# Patient Record
Sex: FEMALE | Race: WHITE | NOT HISPANIC OR LATINO | Employment: FULL TIME | URBAN - METROPOLITAN AREA
[De-identification: names, ages, dates, MRNs, and addresses within clinical notes are randomized per-mention and may not be internally consistent; named-entity substitution may affect disease eponyms.]

---

## 2017-02-08 PROBLEM — R26.2 DIFFICULTY IN WALKING: Status: ACTIVE | Noted: 2017-02-08

## 2017-02-08 PROBLEM — R42 DIZZINESS: Status: ACTIVE | Noted: 2017-02-08

## 2017-02-08 PROBLEM — R29.3 ABNORMAL POSTURE: Status: ACTIVE | Noted: 2017-02-08

## 2017-02-08 PROBLEM — H81.90 VERTIGINOUS SYNDROME: Status: ACTIVE | Noted: 2017-02-08

## 2017-11-29 RX ORDER — LEVOTHYROXINE SODIUM 88 UG/1
88 TABLET ORAL
Qty: 30 TABLET | Refills: 1 | Status: SHIPPED | OUTPATIENT
Start: 2017-11-29 | End: 2018-01-16 | Stop reason: SDUPTHER

## 2018-01-11 ENCOUNTER — LAB VISIT (OUTPATIENT)
Dept: LAB | Facility: HOSPITAL | Age: 43
End: 2018-01-11
Attending: INTERNAL MEDICINE
Payer: COMMERCIAL

## 2018-01-11 DIAGNOSIS — E89.0 POST-SURGICAL HYPOTHYROIDISM: ICD-10-CM

## 2018-01-11 LAB — TSH SERPL DL<=0.005 MIU/L-ACNC: 1.5 UIU/ML

## 2018-01-11 PROCEDURE — 84443 ASSAY THYROID STIM HORMONE: CPT

## 2018-01-11 PROCEDURE — 36415 COLL VENOUS BLD VENIPUNCTURE: CPT

## 2018-01-16 ENCOUNTER — OFFICE VISIT (OUTPATIENT)
Dept: ENDOCRINOLOGY | Facility: CLINIC | Age: 43
End: 2018-01-16
Payer: COMMERCIAL

## 2018-01-16 VITALS
DIASTOLIC BLOOD PRESSURE: 88 MMHG | WEIGHT: 146.38 LBS | SYSTOLIC BLOOD PRESSURE: 118 MMHG | BODY MASS INDEX: 24.99 KG/M2 | HEIGHT: 64 IN | HEART RATE: 88 BPM

## 2018-01-16 DIAGNOSIS — E89.0 POST-SURGICAL HYPOTHYROIDISM: Primary | ICD-10-CM

## 2018-01-16 PROCEDURE — 99999 PR PBB SHADOW E&M-EST. PATIENT-LVL III: CPT | Mod: PBBFAC,,, | Performed by: INTERNAL MEDICINE

## 2018-01-16 PROCEDURE — 99213 OFFICE O/P EST LOW 20 MIN: CPT | Mod: S$GLB,,, | Performed by: INTERNAL MEDICINE

## 2018-01-16 RX ORDER — LEVOTHYROXINE SODIUM 88 UG/1
88 TABLET ORAL
Qty: 90 TABLET | Refills: 3 | Status: SHIPPED | OUTPATIENT
Start: 2018-01-16 | End: 2018-11-30 | Stop reason: SDUPTHER

## 2018-01-16 NOTE — PROGRESS NOTES
Subjective:      Patient ID: Austyn Burr is a 42 y.o. female.    Chief Complaint:  Thyroid Problem     History of Present Illness  Ms. Andino is presenting for follow up of hypothyroidism s/p thyroidectomy due to bilateral thyroid nodules in 2010 at [facility] Pathology was benign.  Last seen by Dr. Lewis   She is currently on brand name Synthroid 88 mcg daily, since 11/2016     She is currently on Synthroid 88 mcg daily, she takes it regularly. Does not miss any doses. Takes thyroid hormone 2 hrs after breakfast and separate from other medications or supplements. She denies any changes to her weight. Continues to have fatigue. BM's regular. Has anxiety and takes Lexapro. Denies tremors.  Occasional palpitations    Menses regular occurring every 28 days     Seasonal allergies. She has active history of MS.    Vit D borderline normal/insufficiency - takes vit d 22165 IU weekly        Review of Systems   Constitutional: Negative for chills and fever.   Gastrointestinal: Negative for nausea.   No CP  No SOB  No abdominal pain    Objective:   Physical Exam   Nursing note and vitals reviewed.  No thyroid tissue palpated  DTR's 2 + at the knees  No tremor  No tachycardia    Lab Review:   Results for AUSTYN BURR (MRN [id number]) as of 1/16/2018 09:48   Ref. Range 10/29/2015 10:30 2/4/2016 10:40 11/15/2016 11:30 1/11/2018 09:48   TSH Latest Ref Range: 0.400 - 4.000 uIU/mL 2.272 1.537 1.562 1.503       Assessment:     1. Post-surgical hypothyroidism      Plan:     Patient with post surgical hypothyroidism and is biochemical euthyroid  Continue brand name Synthroid 88 mcg daily. Refilled 1 year today.  Recheck tsh yearly    RTC in 1 year. Would like to see Dr. Dubois    Discussed with Dr. Azalia Figueroa MD

## 2018-01-18 NOTE — PROGRESS NOTES
I, Rosalva Vieyra, have personally taken the history and physical exam and I agree with Dr. Figueroa's assessment and plan

## 2018-10-23 PROBLEM — R42 DIZZINESS: Status: ACTIVE | Noted: 2018-10-22

## 2018-10-23 PROBLEM — R26.2 DIFFICULTY WALKING: Status: ACTIVE | Noted: 2018-10-23

## 2018-10-23 PROBLEM — R42 DIZZINESS: Status: RESOLVED | Noted: 2017-02-08 | Resolved: 2018-10-23

## 2018-10-23 PROBLEM — R26.2 DIFFICULTY WALKING: Status: RESOLVED | Noted: 2018-10-23 | Resolved: 2018-10-23

## 2018-11-30 ENCOUNTER — PATIENT MESSAGE (OUTPATIENT)
Dept: ENDOCRINOLOGY | Facility: CLINIC | Age: 43
End: 2018-11-30

## 2018-11-30 RX ORDER — LEVOTHYROXINE SODIUM 88 UG/1
88 TABLET ORAL
Qty: 90 TABLET | Refills: 0 | Status: SHIPPED | OUTPATIENT
Start: 2018-11-30 | End: 2019-01-23 | Stop reason: SDUPTHER

## 2018-12-04 PROBLEM — H81.90 VERTIGINOUS SYNDROME: Status: RESOLVED | Noted: 2017-02-08 | Resolved: 2018-12-04

## 2018-12-04 PROBLEM — R26.2 DIFFICULTY WALKING: Status: RESOLVED | Noted: 2018-10-23 | Resolved: 2018-12-04

## 2018-12-04 PROBLEM — R42 DIZZINESS: Status: RESOLVED | Noted: 2018-10-22 | Resolved: 2018-12-04

## 2019-01-08 ENCOUNTER — PATIENT MESSAGE (OUTPATIENT)
Dept: ENDOCRINOLOGY | Facility: CLINIC | Age: 44
End: 2019-01-08

## 2019-01-08 ENCOUNTER — TELEPHONE (OUTPATIENT)
Dept: ENDOCRINOLOGY | Facility: CLINIC | Age: 44
End: 2019-01-08

## 2019-01-08 DIAGNOSIS — E89.0 POST-SURGICAL HYPOTHYROIDISM: Primary | ICD-10-CM

## 2019-01-16 ENCOUNTER — PATIENT MESSAGE (OUTPATIENT)
Dept: ENDOCRINOLOGY | Facility: CLINIC | Age: 44
End: 2019-01-16

## 2019-01-21 ENCOUNTER — LAB VISIT (OUTPATIENT)
Dept: LAB | Facility: HOSPITAL | Age: 44
End: 2019-01-21
Attending: INTERNAL MEDICINE
Payer: COMMERCIAL

## 2019-01-21 DIAGNOSIS — E89.0 POST-SURGICAL HYPOTHYROIDISM: ICD-10-CM

## 2019-01-21 LAB
T3 SERPL-MCNC: 85 NG/DL
T4 FREE SERPL-MCNC: 0.96 NG/DL
TSH SERPL DL<=0.005 MIU/L-ACNC: 1.24 UIU/ML

## 2019-01-21 PROCEDURE — 84439 ASSAY OF FREE THYROXINE: CPT

## 2019-01-21 PROCEDURE — 84480 ASSAY TRIIODOTHYRONINE (T3): CPT

## 2019-01-21 PROCEDURE — 36415 COLL VENOUS BLD VENIPUNCTURE: CPT

## 2019-01-21 PROCEDURE — 84443 ASSAY THYROID STIM HORMONE: CPT

## 2019-01-23 ENCOUNTER — OFFICE VISIT (OUTPATIENT)
Dept: ENDOCRINOLOGY | Facility: CLINIC | Age: 44
End: 2019-01-23
Payer: COMMERCIAL

## 2019-01-23 VITALS
HEART RATE: 71 BPM | WEIGHT: 156.5 LBS | SYSTOLIC BLOOD PRESSURE: 104 MMHG | HEIGHT: 64 IN | BODY MASS INDEX: 26.72 KG/M2 | DIASTOLIC BLOOD PRESSURE: 80 MMHG

## 2019-01-23 DIAGNOSIS — G35 MULTIPLE SCLEROSIS: ICD-10-CM

## 2019-01-23 DIAGNOSIS — E89.0 POST-SURGICAL HYPOTHYROIDISM: Primary | ICD-10-CM

## 2019-01-23 PROCEDURE — 3008F BODY MASS INDEX DOCD: CPT | Mod: CPTII,S$GLB,, | Performed by: INTERNAL MEDICINE

## 2019-01-23 PROCEDURE — 99999 PR PBB SHADOW E&M-EST. PATIENT-LVL III: CPT | Mod: PBBFAC,,, | Performed by: INTERNAL MEDICINE

## 2019-01-23 PROCEDURE — 99999 PR PBB SHADOW E&M-EST. PATIENT-LVL III: ICD-10-PCS | Mod: PBBFAC,,, | Performed by: INTERNAL MEDICINE

## 2019-01-23 PROCEDURE — 99213 PR OFFICE/OUTPT VISIT, EST, LEVL III, 20-29 MIN: ICD-10-PCS | Mod: S$GLB,,, | Performed by: INTERNAL MEDICINE

## 2019-01-23 PROCEDURE — 99213 OFFICE O/P EST LOW 20 MIN: CPT | Mod: S$GLB,,, | Performed by: INTERNAL MEDICINE

## 2019-01-23 PROCEDURE — 3008F PR BODY MASS INDEX (BMI) DOCUMENTED: ICD-10-PCS | Mod: CPTII,S$GLB,, | Performed by: INTERNAL MEDICINE

## 2019-01-23 RX ORDER — LEVOTHYROXINE SODIUM 88 UG/1
88 TABLET ORAL
Qty: 90 TABLET | Refills: 3 | Status: SHIPPED | OUTPATIENT
Start: 2019-01-23 | End: 2020-02-06

## 2019-01-23 NOTE — PATIENT INSTRUCTIONS
Labs and follow up visit with me in one year.     Copies of your blood tests:    Results for orders placed or performed in visit on 01/21/19   TSH   Result Value Ref Range    TSH 1.242 0.400 - 4.000 uIU/mL   T3   Result Value Ref Range    T3, Total 85 60 - 180 ng/dL   T4, free   Result Value Ref Range    Free T4 0.96 0.71 - 1.51 ng/dL

## 2019-01-23 NOTE — PROGRESS NOTES
"Subjective:     Patient ID: Thu Juares is a 43 y.o. female.    Chief Complaint: No chief complaint on file.    HPI:   Ms. Juares is a 43 y.o. female who is here for a follow-up visit for evaluation of hypothyroidism s/p thyroidectomy due to bilateral thyroid nodules in 2010 at [facility]ology was benign.     Quit her job last year, felt very upset and angry regarding this change. Started antidepressant and her weight (20 lbs) increased in the past year.    She is currently on brand name Synthroid 88 mcg daily, since 11/2016     She is currently on Synthroid 88 mcg daily, she takes it regularly. Does not miss any doses. Takes thyroid hormone 2 hrs after breakfast and separate from other medications or supplements.     Stopped lexapro and started trintellix by neurologist. Stopped betaseron for MS, she takes Ocrevus infusion twice yearly.        Menses regular occurring every 28 days     Seasonal allergies. She has active history of MS.     Vit D borderline normal/insufficiency - takes vit d 00127 IU weekly     Review of Systems   Other than HPI, negative    Objective:     Physical Exam   Constitutional: She is oriented to person, place, and time. She appears well-developed and well-nourished. No distress.   Eyes: Conjunctivae and EOM are normal. Pupils are equal, round, and reactive to light. No scleral icterus.   No proptosis.    Neck: Normal range of motion. Neck supple.   No thyroid tissue   Cardiovascular: Normal rate and intact distal pulses.   Pulmonary/Chest: Effort normal and breath sounds normal.   Neurological: She is alert and oriented to person, place, and time. She has normal reflexes.   No tremor.   Skin: Skin is warm and dry.   Psychiatric: She has a normal mood and affect.       Vitals:    01/23/19 1008   BP: 104/80   Pulse: 71   Weight: 71 kg (156 lb 8.4 oz)   Height: 5' 4" (1.626 m)       Results for orders placed or performed in visit on 01/21/19   TSH   Result Value Ref Range    " TSH 1.242 0.400 - 4.000 uIU/mL   T3   Result Value Ref Range    T3, Total 85 60 - 180 ng/dL   T4, free   Result Value Ref Range    Free T4 0.96 0.71 - 1.51 ng/dL         Assessment/Plan:     Ms. Juares is a 43 year old woman who is here for evaluation of post surgical hypothyroidism following episode of depression. Currently doing well on new medications.     Discussed weight gain, advised increase exercise/activity level. Set personal goals, change diet.     1. Post-surgical hypothyroidism    - SYNTHROID 88 mcg tablet; Take 1 tablet (88 mcg total) by mouth before breakfast.  Dispense: 90 tablet; Refill: 3    2. Multiple sclerosis    F/u in one year with labs and visit.

## 2020-02-06 DIAGNOSIS — E89.0 POST-SURGICAL HYPOTHYROIDISM: ICD-10-CM

## 2020-02-06 RX ORDER — LEVOTHYROXINE SODIUM 88 UG/1
TABLET ORAL
Qty: 90 TABLET | Refills: 3 | Status: SHIPPED | OUTPATIENT
Start: 2020-02-06 | End: 2020-04-21 | Stop reason: SDUPTHER

## 2020-03-23 ENCOUNTER — PATIENT MESSAGE (OUTPATIENT)
Dept: ENDOCRINOLOGY | Facility: CLINIC | Age: 45
End: 2020-03-23

## 2020-03-23 DIAGNOSIS — E89.0 POST-SURGICAL HYPOTHYROIDISM: Primary | ICD-10-CM

## 2020-04-17 ENCOUNTER — LAB VISIT (OUTPATIENT)
Dept: LAB | Facility: HOSPITAL | Age: 45
End: 2020-04-17
Attending: INTERNAL MEDICINE
Payer: COMMERCIAL

## 2020-04-17 DIAGNOSIS — E89.0 POST-SURGICAL HYPOTHYROIDISM: ICD-10-CM

## 2020-04-17 LAB
T3 SERPL-MCNC: 52 NG/DL (ref 60–180)
T4 FREE SERPL-MCNC: 1.02 NG/DL (ref 0.71–1.51)
TSH SERPL DL<=0.005 MIU/L-ACNC: 2.27 UIU/ML (ref 0.4–4)

## 2020-04-17 PROCEDURE — 84480 ASSAY TRIIODOTHYRONINE (T3): CPT

## 2020-04-17 PROCEDURE — 36415 COLL VENOUS BLD VENIPUNCTURE: CPT

## 2020-04-17 PROCEDURE — 84439 ASSAY OF FREE THYROXINE: CPT

## 2020-04-17 PROCEDURE — 84443 ASSAY THYROID STIM HORMONE: CPT

## 2020-04-21 ENCOUNTER — TELEPHONE (OUTPATIENT)
Dept: ENDOCRINOLOGY | Facility: CLINIC | Age: 45
End: 2020-04-21

## 2020-04-21 ENCOUNTER — OFFICE VISIT (OUTPATIENT)
Dept: ENDOCRINOLOGY | Facility: CLINIC | Age: 45
End: 2020-04-21
Payer: COMMERCIAL

## 2020-04-21 DIAGNOSIS — E89.0 POST-SURGICAL HYPOTHYROIDISM: ICD-10-CM

## 2020-04-21 PROCEDURE — 99213 PR OFFICE/OUTPT VISIT, EST, LEVL III, 20-29 MIN: ICD-10-PCS | Mod: 95,,, | Performed by: INTERNAL MEDICINE

## 2020-04-21 PROCEDURE — 99213 OFFICE O/P EST LOW 20 MIN: CPT | Mod: 95,,, | Performed by: INTERNAL MEDICINE

## 2020-04-21 RX ORDER — LEVOTHYROXINE SODIUM 88 UG/1
88 TABLET ORAL
Qty: 90 TABLET | Refills: 3 | Status: SHIPPED | OUTPATIENT
Start: 2020-04-21 | End: 2021-04-06 | Stop reason: SDUPTHER

## 2020-04-21 NOTE — PROGRESS NOTES
Subjective:      Patient ID: Thu Juares is a 45 y.o. female.    Chief Complaint:  No chief complaint on file.      History of Present Illness  Ms. Juares is a 43 y.o. female who is here for a follow-up visit for evaluation of hypothyroidism s/p thyroidectomy due to bilateral thyroid nodules in 2010 at [facility] Pathology was benign.      She is currently on brand name Synthroid 88 mcg daily, since 11/2016  Does not miss any doses. Takes thyroid hormone 2 hrs after breakfast and separate from other medications or supplements. weight is 153 lbs which is stable. Appetite is the same. Exercising a little bit. Riding bike.      Stopped lexapro and trintellix by neurologist.   Continues to take Ocrevus infusion twice yearly for her MS. Reports hair loss with steroid injections for three days. Reports diffuse hair loss.      Menses regular occurring every 28 days  Seasonal allergies. She has active history of MS.     Supplements:  Vitamin C daily   Vit D borderline normal/insufficiency - takes vit d 83945 IU weekly     Review of Systems   Constitutional: Negative for fever.   HENT: Negative for congestion.    Eyes: Negative for visual disturbance.   Respiratory: Negative for shortness of breath.    Cardiovascular: Negative for chest pain.   Gastrointestinal: Negative for abdominal pain.   Genitourinary: Negative for dysuria.   Musculoskeletal: Negative for arthralgias.   Skin: Negative for rash.   Neurological: Negative for weakness.   Hematological: Does not bruise/bleed easily.   Psychiatric/Behavioral: Negative for sleep disturbance.       Objective:   Physical Exam  There were no vitals filed for this visit.    BP Readings from Last 3 Encounters:   01/23/19 104/80   01/16/18 118/88   11/15/16 118/81     Wt Readings from Last 1 Encounters:   01/23/19 1008 71 kg (156 lb 8.4 oz)         There is no height or weight on file to calculate BMI.    Lab Review:   No results found for: HGBA1C  Lab Results    Component Value Date    CHOL 197 04/20/2015    HDL 39 (L) 04/20/2015    LDLCALC 108.0 04/20/2015    TRIG 250 (H) 04/20/2015    CHOLHDL 19.8 (L) 04/20/2015     Lab Results   Component Value Date    CALCIUM 9.4 10/15/2009    TSH 2.275 04/17/2020         Assessment and Plan     Post-surgical hypothyroidism  Appears clinically euthyroid (reports no symptoms of hypothyroidism)  biochemically euthyroid.   No changes to dose.   Repeat TSH in six months    The patient location is: home   The chief complaint leading to consultation is: hypothyroidism/sp thyroidectomy   Visit type: audiovisual  Total time spent with patient: 10 min   Each patient to whom he or she provides medical services by telemedicine is:  (1) informed of the relationship between the physician and patient and the respective role of any other health care provider with respect to management of the patient; and (2) notified that he or she may decline to receive medical services by telemedicine and may withdraw from such care at any time.

## 2020-04-21 NOTE — ASSESSMENT & PLAN NOTE
Appears clinically euthyroid (reports no symptoms of hypothyroidism)  biochemically euthyroid.   No changes to dose.   Repeat TSH in six months

## 2020-10-21 ENCOUNTER — LAB VISIT (OUTPATIENT)
Dept: LAB | Facility: HOSPITAL | Age: 45
End: 2020-10-21
Attending: INTERNAL MEDICINE
Payer: COMMERCIAL

## 2020-10-21 DIAGNOSIS — E89.0 POST-SURGICAL HYPOTHYROIDISM: ICD-10-CM

## 2020-10-21 LAB
T4 FREE SERPL-MCNC: 0.89 NG/DL (ref 0.71–1.51)
TSH SERPL DL<=0.005 MIU/L-ACNC: 6.16 UIU/ML (ref 0.4–4)

## 2020-10-21 PROCEDURE — 84443 ASSAY THYROID STIM HORMONE: CPT

## 2020-10-21 PROCEDURE — 36415 COLL VENOUS BLD VENIPUNCTURE: CPT

## 2020-10-21 PROCEDURE — 84439 ASSAY OF FREE THYROXINE: CPT

## 2020-10-25 DIAGNOSIS — E89.0 POST-SURGICAL HYPOTHYROIDISM: Primary | ICD-10-CM

## 2020-11-01 ENCOUNTER — PATIENT MESSAGE (OUTPATIENT)
Dept: ENDOCRINOLOGY | Facility: CLINIC | Age: 45
End: 2020-11-01

## 2020-11-09 ENCOUNTER — LAB VISIT (OUTPATIENT)
Dept: LAB | Facility: HOSPITAL | Age: 45
End: 2020-11-09
Attending: INTERNAL MEDICINE
Payer: COMMERCIAL

## 2020-11-09 DIAGNOSIS — E89.0 POST-SURGICAL HYPOTHYROIDISM: ICD-10-CM

## 2020-11-09 LAB — TSH SERPL DL<=0.005 MIU/L-ACNC: 3.14 UIU/ML (ref 0.4–4)

## 2020-11-09 PROCEDURE — 36415 COLL VENOUS BLD VENIPUNCTURE: CPT

## 2020-11-09 PROCEDURE — 84443 ASSAY THYROID STIM HORMONE: CPT

## 2020-11-10 DIAGNOSIS — E89.0 POST-SURGICAL HYPOTHYROIDISM: Primary | ICD-10-CM

## 2021-04-06 DIAGNOSIS — E89.0 POST-SURGICAL HYPOTHYROIDISM: ICD-10-CM

## 2021-04-07 RX ORDER — LEVOTHYROXINE SODIUM 88 UG/1
88 TABLET ORAL
Qty: 90 TABLET | Refills: 3 | Status: SHIPPED | OUTPATIENT
Start: 2021-04-07 | End: 2021-08-04 | Stop reason: SDUPTHER

## 2021-04-15 PROBLEM — R42 VERTIGO: Status: ACTIVE | Noted: 2021-04-15

## 2021-04-15 PROBLEM — R26.89 IMBALANCE: Status: ACTIVE | Noted: 2021-04-15

## 2021-05-25 PROBLEM — R26.2 DIFFICULTY IN WALKING: Status: RESOLVED | Noted: 2017-02-08 | Resolved: 2021-05-25

## 2021-05-25 PROBLEM — R42 DIZZINESS: Status: RESOLVED | Noted: 2018-10-22 | Resolved: 2021-05-25

## 2021-07-26 ENCOUNTER — LAB VISIT (OUTPATIENT)
Dept: LAB | Facility: HOSPITAL | Age: 46
End: 2021-07-26
Attending: INTERNAL MEDICINE
Payer: COMMERCIAL

## 2021-07-26 DIAGNOSIS — E89.0 POST-SURGICAL HYPOTHYROIDISM: ICD-10-CM

## 2021-07-26 LAB — TSH SERPL DL<=0.005 MIU/L-ACNC: 0.89 UIU/ML (ref 0.4–4)

## 2021-07-26 PROCEDURE — 84443 ASSAY THYROID STIM HORMONE: CPT | Performed by: INTERNAL MEDICINE

## 2021-07-26 PROCEDURE — 36415 COLL VENOUS BLD VENIPUNCTURE: CPT | Performed by: INTERNAL MEDICINE

## 2021-08-04 ENCOUNTER — OFFICE VISIT (OUTPATIENT)
Dept: ENDOCRINOLOGY | Facility: CLINIC | Age: 46
End: 2021-08-04
Payer: COMMERCIAL

## 2021-08-04 VITALS
SYSTOLIC BLOOD PRESSURE: 107 MMHG | OXYGEN SATURATION: 98 % | HEIGHT: 64 IN | WEIGHT: 146.38 LBS | BODY MASS INDEX: 24.99 KG/M2 | DIASTOLIC BLOOD PRESSURE: 76 MMHG | HEART RATE: 84 BPM

## 2021-08-04 DIAGNOSIS — E89.0 POST-SURGICAL HYPOTHYROIDISM: ICD-10-CM

## 2021-08-04 PROCEDURE — 3074F SYST BP LT 130 MM HG: CPT | Mod: CPTII,S$GLB,, | Performed by: INTERNAL MEDICINE

## 2021-08-04 PROCEDURE — 99213 PR OFFICE/OUTPT VISIT, EST, LEVL III, 20-29 MIN: ICD-10-PCS | Mod: S$GLB,,, | Performed by: INTERNAL MEDICINE

## 2021-08-04 PROCEDURE — 1160F PR REVIEW ALL MEDS BY PRESCRIBER/CLIN PHARMACIST DOCUMENTED: ICD-10-PCS | Mod: CPTII,S$GLB,, | Performed by: INTERNAL MEDICINE

## 2021-08-04 PROCEDURE — 1126F PR PAIN SEVERITY QUANTIFIED, NO PAIN PRESENT: ICD-10-PCS | Mod: CPTII,S$GLB,, | Performed by: INTERNAL MEDICINE

## 2021-08-04 PROCEDURE — 99213 OFFICE O/P EST LOW 20 MIN: CPT | Mod: S$GLB,,, | Performed by: INTERNAL MEDICINE

## 2021-08-04 PROCEDURE — 3078F DIAST BP <80 MM HG: CPT | Mod: CPTII,S$GLB,, | Performed by: INTERNAL MEDICINE

## 2021-08-04 PROCEDURE — 3078F PR MOST RECENT DIASTOLIC BLOOD PRESSURE < 80 MM HG: ICD-10-PCS | Mod: CPTII,S$GLB,, | Performed by: INTERNAL MEDICINE

## 2021-08-04 PROCEDURE — 1159F PR MEDICATION LIST DOCUMENTED IN MEDICAL RECORD: ICD-10-PCS | Mod: CPTII,S$GLB,, | Performed by: INTERNAL MEDICINE

## 2021-08-04 PROCEDURE — 1160F RVW MEDS BY RX/DR IN RCRD: CPT | Mod: CPTII,S$GLB,, | Performed by: INTERNAL MEDICINE

## 2021-08-04 PROCEDURE — 3008F PR BODY MASS INDEX (BMI) DOCUMENTED: ICD-10-PCS | Mod: CPTII,S$GLB,, | Performed by: INTERNAL MEDICINE

## 2021-08-04 PROCEDURE — 1159F MED LIST DOCD IN RCRD: CPT | Mod: CPTII,S$GLB,, | Performed by: INTERNAL MEDICINE

## 2021-08-04 PROCEDURE — 3008F BODY MASS INDEX DOCD: CPT | Mod: CPTII,S$GLB,, | Performed by: INTERNAL MEDICINE

## 2021-08-04 PROCEDURE — 99999 PR PBB SHADOW E&M-EST. PATIENT-LVL III: CPT | Mod: PBBFAC,,, | Performed by: INTERNAL MEDICINE

## 2021-08-04 PROCEDURE — 99999 PR PBB SHADOW E&M-EST. PATIENT-LVL III: ICD-10-PCS | Mod: PBBFAC,,, | Performed by: INTERNAL MEDICINE

## 2021-08-04 PROCEDURE — 1126F AMNT PAIN NOTED NONE PRSNT: CPT | Mod: CPTII,S$GLB,, | Performed by: INTERNAL MEDICINE

## 2021-08-04 PROCEDURE — 3074F PR MOST RECENT SYSTOLIC BLOOD PRESSURE < 130 MM HG: ICD-10-PCS | Mod: CPTII,S$GLB,, | Performed by: INTERNAL MEDICINE

## 2021-08-04 RX ORDER — LEVOTHYROXINE SODIUM 88 UG/1
88 TABLET ORAL
Qty: 90 TABLET | Refills: 3 | Status: SHIPPED | OUTPATIENT
Start: 2021-08-04 | End: 2022-04-18 | Stop reason: SDUPTHER

## 2021-08-04 RX ORDER — ATORVASTATIN CALCIUM 40 MG/1
40 TABLET, FILM COATED ORAL DAILY
COMMUNITY

## 2021-12-21 ENCOUNTER — PATIENT MESSAGE (OUTPATIENT)
Dept: NEUROLOGY | Facility: CLINIC | Age: 46
End: 2021-12-21
Payer: COMMERCIAL

## 2022-02-28 ENCOUNTER — PATIENT MESSAGE (OUTPATIENT)
Dept: PSYCHIATRY | Facility: CLINIC | Age: 47
End: 2022-02-28
Payer: COMMERCIAL

## 2022-04-18 DIAGNOSIS — E89.0 POST-SURGICAL HYPOTHYROIDISM: ICD-10-CM

## 2022-04-18 NOTE — TELEPHONE ENCOUNTER
----- Message from Pepito Mercer sent at 4/18/2022 10:33 AM CDT -----  Contact: Patient  The pt called and needs a refill for SYNTHROID 88 mcg tablet 90 tablet     She needs a PA for it and it can't be the generic    You need to call [phone]    The pt can be reached at [phone]

## 2022-04-19 RX ORDER — LEVOTHYROXINE SODIUM 88 UG/1
88 TABLET ORAL
Qty: 90 TABLET | Refills: 3 | Status: SHIPPED | OUTPATIENT
Start: 2022-04-19 | End: 2022-04-26 | Stop reason: SDUPTHER

## 2022-04-20 ENCOUNTER — PATIENT MESSAGE (OUTPATIENT)
Dept: ENDOCRINOLOGY | Facility: CLINIC | Age: 47
End: 2022-04-20
Payer: COMMERCIAL

## 2022-04-20 DIAGNOSIS — E89.0 POST-SURGICAL HYPOTHYROIDISM: ICD-10-CM

## 2022-04-26 RX ORDER — LEVOTHYROXINE SODIUM 88 UG/1
88 TABLET ORAL
Qty: 90 TABLET | Refills: 3 | Status: SHIPPED | OUTPATIENT
Start: 2022-04-26 | End: 2023-06-05

## 2022-06-24 ENCOUNTER — PATIENT MESSAGE (OUTPATIENT)
Dept: PSYCHIATRY | Facility: CLINIC | Age: 47
End: 2022-06-24
Payer: COMMERCIAL

## 2022-08-17 ENCOUNTER — OFFICE VISIT (OUTPATIENT)
Dept: ENDOCRINOLOGY | Facility: CLINIC | Age: 47
End: 2022-08-17
Payer: COMMERCIAL

## 2022-08-17 ENCOUNTER — LAB VISIT (OUTPATIENT)
Dept: LAB | Facility: HOSPITAL | Age: 47
End: 2022-08-17
Attending: INTERNAL MEDICINE
Payer: COMMERCIAL

## 2022-08-17 VITALS
HEART RATE: 82 BPM | SYSTOLIC BLOOD PRESSURE: 106 MMHG | WEIGHT: 142.88 LBS | OXYGEN SATURATION: 99 % | HEIGHT: 64 IN | DIASTOLIC BLOOD PRESSURE: 70 MMHG | BODY MASS INDEX: 24.39 KG/M2

## 2022-08-17 DIAGNOSIS — E89.0 POST-SURGICAL HYPOTHYROIDISM: ICD-10-CM

## 2022-08-17 LAB — TSH SERPL DL<=0.005 MIU/L-ACNC: 3.57 UIU/ML (ref 0.4–4)

## 2022-08-17 PROCEDURE — 3008F PR BODY MASS INDEX (BMI) DOCUMENTED: ICD-10-PCS | Mod: CPTII,S$GLB,, | Performed by: INTERNAL MEDICINE

## 2022-08-17 PROCEDURE — 84443 ASSAY THYROID STIM HORMONE: CPT | Performed by: INTERNAL MEDICINE

## 2022-08-17 PROCEDURE — 3074F PR MOST RECENT SYSTOLIC BLOOD PRESSURE < 130 MM HG: ICD-10-PCS | Mod: CPTII,S$GLB,, | Performed by: INTERNAL MEDICINE

## 2022-08-17 PROCEDURE — 99213 PR OFFICE/OUTPT VISIT, EST, LEVL III, 20-29 MIN: ICD-10-PCS | Mod: S$GLB,,, | Performed by: INTERNAL MEDICINE

## 2022-08-17 PROCEDURE — 3078F DIAST BP <80 MM HG: CPT | Mod: CPTII,S$GLB,, | Performed by: INTERNAL MEDICINE

## 2022-08-17 PROCEDURE — 3074F SYST BP LT 130 MM HG: CPT | Mod: CPTII,S$GLB,, | Performed by: INTERNAL MEDICINE

## 2022-08-17 PROCEDURE — 99999 PR PBB SHADOW E&M-EST. PATIENT-LVL III: ICD-10-PCS | Mod: PBBFAC,,, | Performed by: INTERNAL MEDICINE

## 2022-08-17 PROCEDURE — 3078F PR MOST RECENT DIASTOLIC BLOOD PRESSURE < 80 MM HG: ICD-10-PCS | Mod: CPTII,S$GLB,, | Performed by: INTERNAL MEDICINE

## 2022-08-17 PROCEDURE — 1159F MED LIST DOCD IN RCRD: CPT | Mod: CPTII,S$GLB,, | Performed by: INTERNAL MEDICINE

## 2022-08-17 PROCEDURE — 36415 COLL VENOUS BLD VENIPUNCTURE: CPT | Performed by: INTERNAL MEDICINE

## 2022-08-17 PROCEDURE — 3008F BODY MASS INDEX DOCD: CPT | Mod: CPTII,S$GLB,, | Performed by: INTERNAL MEDICINE

## 2022-08-17 PROCEDURE — 99999 PR PBB SHADOW E&M-EST. PATIENT-LVL III: CPT | Mod: PBBFAC,,, | Performed by: INTERNAL MEDICINE

## 2022-08-17 PROCEDURE — 99213 OFFICE O/P EST LOW 20 MIN: CPT | Mod: S$GLB,,, | Performed by: INTERNAL MEDICINE

## 2022-08-17 PROCEDURE — 1159F PR MEDICATION LIST DOCUMENTED IN MEDICAL RECORD: ICD-10-PCS | Mod: CPTII,S$GLB,, | Performed by: INTERNAL MEDICINE

## 2022-08-17 NOTE — PROGRESS NOTES
Subjective:      Patient ID: Thu Juares is a 47 y.o. female.    Chief Complaint:  Hypothyroidism      History of Present Illness    Ms. Juares is a 47 y.o. female who is here for a follow-up visit for evaluation of hypothyroidism s/p thyroidectomy due to bilateral thyroid nodules in 2010 at [facility]. Pathology was benign.   Denies new medications or medical problems.   She is currently on brand name Synthroid 88 mcg daily, since 11/2016  Does not miss any doses. Takes thyroid hormone 2 hrs after breakfast and separate from other medications or supplements. weight is 142 lbs she is following a lower carb diet, protein and avoids milk. Feels good. Appetite, BMs are normal.      Continues to take Ocrevus infusion twice yearly for her MS.      Menses regular occurring every 28 days  Seasonal allergies. She has active history of MS. (Dr. Mcgrath in )     Supplements:  Vitamin C not taking   Vit D borderline normal/insufficiency - takes vit d 95404 IU weekly   Taking vitamin infusions       Review of Systems  Last week had congestion, cough, sore throat and fatigue. This lasted one week.   No fever   No covid  Feeling better.     Objective:   Physical Exam  Constitutional:       General: She is not in acute distress.     Appearance: She is well-developed.   Eyes:      General: No scleral icterus.     Conjunctiva/sclera: Conjunctivae normal.      Pupils: Pupils are equal, round, and reactive to light.      Comments: No proptosis.    Neck:      Trachea: No tracheal deviation.      Comments: No thyroid tissue palpated.   Cardiovascular:      Rate and Rhythm: Normal rate.   Pulmonary:      Effort: Pulmonary effort is normal.      Breath sounds: Normal breath sounds.   Skin:     General: Skin is warm and dry.      Findings: No rash.   Neurological:      Mental Status: She is alert and oriented to person, place, and time.      Deep Tendon Reflexes: Reflexes are normal and symmetric.      Comments: No tremor.  "      Vitals:    08/17/22 1337   BP: 106/70   BP Location: Left arm   Patient Position: Sitting   BP Method: Medium (Manual)   Pulse: 82   SpO2: 99%   Weight: 64.8 kg (142 lb 13.7 oz)   Height: 5' 4" (1.626 m)       BP Readings from Last 3 Encounters:   08/17/22 106/70   08/04/21 107/76   01/23/19 104/80     Wt Readings from Last 1 Encounters:   08/17/22 1337 64.8 kg (142 lb 13.7 oz)         Body mass index is 24.52 kg/m².    Lab Review:   No results found for: HGBA1C  Lab Results   Component Value Date    CHOL 197 04/20/2015    HDL 39 (L) 04/20/2015    LDLCALC 108.0 04/20/2015    TRIG 250 (H) 04/20/2015    CHOLHDL 19.8 (L) 04/20/2015     Lab Results   Component Value Date    CALCIUM 9.4 10/15/2009    TSH 3.573 08/17/2022         Assessment and Plan     Post-surgical hypothyroidism  Clinically and biuochemically euthyroid   TSH of 3.5 previously .8,  Most l ikely related to administration   No new medications (steroid injection two weeks ago)   Has lost weight   May be related to administration  Discussed administrtion, will try for fasting in the morning.   Repeat TSH in three months.           "

## 2022-08-17 NOTE — ASSESSMENT & PLAN NOTE
Clinically and biuochemically euthyroid   TSH of 3.5 previously .8,  Most l ikely related to administration   No new medications (steroid injection two weeks ago)   Has lost weight   May be related to administration  Discussed administrtion, will try for fasting in the morning.   Repeat TSH in three months.

## 2022-11-11 ENCOUNTER — TELEPHONE (OUTPATIENT)
Dept: ENDOCRINOLOGY | Facility: CLINIC | Age: 47
End: 2022-11-11
Payer: COMMERCIAL

## 2022-11-11 DIAGNOSIS — E89.0 POST-SURGICAL HYPOTHYROIDISM: Primary | ICD-10-CM

## 2022-11-12 ENCOUNTER — PATIENT MESSAGE (OUTPATIENT)
Dept: ENDOCRINOLOGY | Facility: CLINIC | Age: 47
End: 2022-11-12
Payer: COMMERCIAL

## 2022-11-14 ENCOUNTER — LAB VISIT (OUTPATIENT)
Dept: LAB | Facility: HOSPITAL | Age: 47
End: 2022-11-14
Attending: INTERNAL MEDICINE
Payer: COMMERCIAL

## 2022-11-14 DIAGNOSIS — E89.0 POST-SURGICAL HYPOTHYROIDISM: ICD-10-CM

## 2022-11-14 LAB
T3 SERPL-MCNC: 79 NG/DL (ref 60–180)
T4 FREE SERPL-MCNC: 1.12 NG/DL (ref 0.71–1.51)
TSH SERPL DL<=0.005 MIU/L-ACNC: 2.27 UIU/ML (ref 0.4–4)

## 2022-11-14 PROCEDURE — 84480 ASSAY TRIIODOTHYRONINE (T3): CPT | Performed by: INTERNAL MEDICINE

## 2022-11-14 PROCEDURE — 84443 ASSAY THYROID STIM HORMONE: CPT | Performed by: INTERNAL MEDICINE

## 2022-11-14 PROCEDURE — 36415 COLL VENOUS BLD VENIPUNCTURE: CPT | Performed by: INTERNAL MEDICINE

## 2022-11-14 PROCEDURE — 84439 ASSAY OF FREE THYROXINE: CPT | Performed by: INTERNAL MEDICINE

## 2022-11-14 NOTE — TELEPHONE ENCOUNTER
Spoke with patient. Patient states she cancel her appointment because she saw  a few months ago, and all what was going on in the visit is just redoing her blood test. Patient states she would like  to review her labs and go from there regarding if an follow up appointment is needed.

## 2022-11-29 ENCOUNTER — TELEPHONE (OUTPATIENT)
Dept: NEUROLOGY | Facility: CLINIC | Age: 47
End: 2022-11-29
Payer: COMMERCIAL

## 2022-12-01 ENCOUNTER — PATIENT MESSAGE (OUTPATIENT)
Dept: PSYCHIATRY | Facility: CLINIC | Age: 47
End: 2022-12-01
Payer: COMMERCIAL

## 2023-01-14 NOTE — PROGRESS NOTES
Patient ID: [id number]  Referring Physician: Zabrina Urban MD    Chief Complaint/Reason for Consult: establishing care for MS     Subjective:     HPI  Thu Juares is a 47 y.o. RH female with hyperlipidemia, hypothyroidism s/p thyroidectomy due to bilateral thyroid nodules in 2010 and multiple sclerosis. she is presenting today as a new patient to establish care. She is accompanied by her .    Symptom History:  Aug/2004 - she was 7 months postpartum, was cutting the grass in the heat outside. Developed an acute episode of vertigo and N/V. It was initially treated symptomatically, her ENT obtained and MRI since symptoms did not resolve. She was diagnosed with MS with enhancing and non-enhancing brain lesions and abnormal visual evoked potentials. She had another relapse probably around Oct 2004. MRI of cord or spinal tap was not performed at the time. She was started on Betaseron and then Copaxone (lymphadenopathy?), then switched back to Betaseron for another 10 years  0242-7642 - tried Tysabri x3, however due to reaction to Tysabri had to go back on Betaseron.  [id number] - had an episode of headache, N/V, difficulty getting the words out and gray curtain over the visual field (binocular), lasted a few hours and resolved with sleep, was told she had ocular migraine.   3/2018 - she can not recall what symptoms she was having around that time but it required 3 days of IVMP  10/2018 - she was switched to Ocrevus due to presence of cord lesions. the first infusion was followed by a period of fatigue and fever, but the second infusion was tolerated better. Currently tolerates well.   She was following with Dr. Urban up until recently, she is now transferring care to [facility] since she has left the state.     Today - she reports intermittent tingling and tightness along bilateral jaw which responds to oral steroids. Intermittently gets twitching in face/eyelids, hasn't tried the baclofen. She is up to date  with her mammogram.     MS ROS:   Fatigue: Yes - fatigued at the end of the work day   Sleep Disturbance: Yes - wakes up in the middle of the night and can't go back to sleep   Bladder Dysfunction: Yes - urge incontinence, trying to be proactive about emptying her bladder, no UTIs   Bowel Dysfunction: Yes - constipation and incontinence once every few months   Spasticity: Yes - tightness in the mornings, myoclonic jerks at nightd   Lhermitte's Sign: No   Visual Symptoms: Yes - as mentioned above   Cognitive: Yes - difficulty remembering when initiating a process but then it gets easier   Mood Disorder: Yes - depression, job related, unchanged or slightly better   Gait Disturbance: Yes - balance is off, veering off   Falls: No  Hand Dysfunction: did not assess  Pain: Yes - feet and calves hurt (like a muscle ache)   Tremors: No   Dysphagia: Yes - solids and liquid   Dysarthria: Yes - just 2 episodes which lasted a few hours   Heat sensitivity: Yes - more fatigued and off balance   Exercise: no  Diet: regular       Past Medical History:  Past Medical History:   Diagnosis Date    Hypothyroidism surgical for benign nodules    Multiple sclerosis     x2  Tubal ligation    Allergies:  Review of patient's allergies indicates:   Allergen Reactions    Natalizumab      Pertinent Family History:  A second cousin with MS. A first cousin has RA. A paternal aunt had lupus.    Pertinent Social History:  No tobacco, rare alcohol, no marijuana (other than CBD oil), no illicit substance use. Lives with  and 2 children (20 y/o boy and 10 y/o girl), and 2 dogs.  to a , very stressful. Has 16 months before she can retire at 22 years.    Medications:  Current Outpatient Medications   Medication Instructions    atorvastatin (LIPITOR) 40 mg, Oral, Daily    levothyroxine (LEVOXYL) 88 mcg, Oral, Before breakfast    ocrelizumab (OCREVUS) 30 mg/mL Soln Every six months.      Disease Modifying Therapy:    - Betaseron, Copaxone  - Tysabri (shortness of breath and flushing during 3rd infusion)  - Ocrevus, Dosing in Feb-Aug    Other relevant medications:   Vitamin D: hasn't been taking recently   Muscle relaxant: Baclofen 5 mg BID PRN (for facial twitching), not taking  Sleep disturbance: CBD oil helped but she ran out    Vaccination status:  -    Objective:     Vitals:    01/17/23 0816   BP: 127/65   Pulse: 80      General:  Well-appearing, well-nourished, NAD, cooperative    Neurologic Exam:   Awake, alert and oriented x3  Speech spontaneous and fluent, intact comprehension.   Spells WORLD backwards. Serial 7s -1.   Adequate fund of knowledge, vocabulary.    CN II - CN XII:  PERRLA. EOM intact. No Nystagmus. No ophthalmoplegia.   Visual fields are full to confrontation. Visual acuity was deferred since she doesn't have her glasses on.   Facial sensation is decreased to light touch on the right.   Facial expression is full and symmetric.   Hearing is intact bilaterally.   Palate elevates symmetrically.   SCM and Trapezius full strength bilaterally.   Tongue is midline.     Motor:  Normal bulk and tone in all four limbs.   There are no atrophy or fasciculations. No tremor.     Shoulder  Abd Shoulder Add Elbow   Flex Elbow  Ext Wrist   Flex Wrist  Ext Finger  Flex Finger  Ext Finger  Abd Finger   Add IO Opposition   Right 5 5 5 5 5 5 5 5 4 5 5 5   Left 5 5 5 5 5 5 5 5 4 5 5 5      Hip  Flex Hip  Ext Thigh   Abd Thigh  Add Knee  Flex Knee  Ext Plantar  Flex Dorsiflex   Right 5 5   5 5 5 5   Left 5 5   5 5 5 5     Sensory:  Light touch: decreased on RLE  Temperature: decreased distally x4  Pinprick: did not assess  Vibration: minimally present at the most distal joint x4 (up to 2-3 seconds only)  Proprioception: intact figure writing. position intact in BUE   Romberg is negative.    DTRs:   Biceps Brachioradialis Triceps Calderon Patellar Ankle Plantar   Right 3+ 3+ 2+ + 2+ 2+ Down   Left 3+ 3+ 2+ + 2+ 2+ Down      Coordination:  Finger to nose and heel to shin testing is normal bilaterally.  Mildly slowed rapid alternating movements. Normal fine finger movements.     Gait:  Normal casual gait  Impaired heel walking, difficult tandem gait.      EDSS: 4 (brainstem FS 2, pyramidal FS 2, cerebellar FS 1, sensory FS 3, B/B FS 2, cerebral FS 1)    Pertinent lab results    11/9/2022  CBC/Diff nl  CMP nl    Pertinent imaging results    *MRI discs of most recent Brain MRI was brought in and reviewed in person:    9/22/2021  MRI Brain w/wo contrast:  Numerous T2/FLAIR hyperintense lesions within bilateral supratentorial white matter   Multiple juxtacortical lesions, periventricular lesions adjacent to posterior horn of the lateral ventricles and 4th ventricle  Notable involvement of posterior parietal lobe (L>R) compared to the frontal lobes            2/17/2021  MRI Brain w/wo contrast:      *no images available for studies below, per radiology report:    2/4/2019  MRI Brain w/wo contrast:  Demyelinating disease burden is moderate in severity, with features typical of MS including juxtacortical lesions and temporal lobe lesions. Black holes are present adjacent to the lateral ventricle occipital horns. When compared to February 2018, disease burden appears stable. After contrast injection, no abnormal enhancement is observed.     MRI Cervical Spine w/wo contrast:  Stable extensive confluent plaques in the cervical spinal cord. No postcontrast enhancement identified to suggest active disease. C3-C4 and C4-C5 mild left intervertebral foraminal stenosis    2/16/2018  MRI Thoracic Spine w/wo contrast:  Cord inhomogeneity left of midline on sagittal imaging within the mid thoracic segments is favored to be volume averaging artifact. A discrete cord lesion is not appreciated on short axis axial imaging nor is there evidence of pathologic cord enhancement. The thoracic spinal canal is widely patent at all levels with no localized disc  displacement or herniation identified    Other pertinent studies  None    Assessment:   Thu Juares is a 47 y.o. right-handed female with hypothyroidism and longstanding multiple sclerosis.  She has accumulated moderate disability in a few functional systems over time yet has remained somewhat stable more recently on Ocrevus infusions every 6 months, she is tolerating the infusions well.  She can not recall any recent relapses, however has intermittent worsening of old symptoms such as sensory disturbances in face. The main MS related problems that are bothering her are difficulty with bowel/bladder control and occasional incontinence which is frustrating.  She has never been evaluated by urologist for these problem. She also mentions intermittent imbalance and spasticity but has not tried the previously prescribed baclofen.  I explained to her that baclofen could assist with nightly myoclonic jerks and morning spasticity as well as potential beneficial effect on sleep.  She is willing to do another trial.  I also mentioned that I would like her to be evaluated by neuro ophthalmologist on a yearly basis.  She is overdue for MRIs to assess for any interval change which I ordered today.  She will need to go to the lab for pre infusion workup in early February.  We will reconvene in another 6 months or sooner if needed.     1. Multiple sclerosis    2. Urge incontinence of urine    3. Bowel and bladder incontinence    4. Myoclonic jerking      Plan:     Diagnosis and surveillance: Multiple Sclerosis  Imaging: MRI Brain, Cervical, and Thoracis Spine wo overdue, ordered today    Disease Modifying Therapies:   Continue Ocrevus q6 months infusions, will renew forms  Pre-infusion labs to be done in 1st week of February:  CBC/diff, CMP, immunoglobulin panel, HBS antigen, HBS antibody, HBC antibody, HIV, QuantiFERON gold TB    Symptom Management/Life style modifications  Referral to Uro gynecology for evaluation and treatment  of bladder issues   Referral to Neuro-ophthalmology for evaluation of optic nerves via OCTs  Trial of Baclofen 5 mg nightly PRN for myoclonic jerks and spasticity  Advise to keep up to date with age-appropriate cancer screening; e.g. mammogram, pap smear, etc  We will consider referral for neurocognitive testing in the future    Follow up: virtual follow up in 6 month       Plan was discussed in detail with the patient, who is in agreement.    Time spent on this encounter: 85 minutes. This includes over 83% face to face time (obtaining history, documenting clinical information in the EMR, physical exam, discussing the plan with patient) and non-face to face time (such as preparing to see the patient (ie. Chart review, reviewing and interpreting previous labs and imaging), further EMR documentation, ordering tests, independently interpreting results).      Cintia Kitchen MD    [facility]  01/17/2023

## 2023-01-16 PROBLEM — E03.9 HYPOTHYROIDISM: Status: ACTIVE | Noted: 2021-05-13

## 2023-01-16 PROBLEM — Z82.49 FAMILY HISTORY OF CORONARY ARTERY DISEASE: Status: ACTIVE | Noted: 2017-05-17

## 2023-01-16 PROBLEM — E78.00 HYPERCHOLESTEROLEMIA: Status: ACTIVE | Noted: 2021-05-13

## 2023-01-17 ENCOUNTER — TELEPHONE (OUTPATIENT)
Dept: OPHTHALMOLOGY | Facility: CLINIC | Age: 48
End: 2023-01-17
Payer: COMMERCIAL

## 2023-01-17 ENCOUNTER — OFFICE VISIT (OUTPATIENT)
Dept: NEUROLOGY | Facility: CLINIC | Age: 48
End: 2023-01-17
Payer: COMMERCIAL

## 2023-01-17 VITALS
HEART RATE: 80 BPM | SYSTOLIC BLOOD PRESSURE: 127 MMHG | WEIGHT: 147.69 LBS | DIASTOLIC BLOOD PRESSURE: 65 MMHG | BODY MASS INDEX: 25.35 KG/M2

## 2023-01-17 DIAGNOSIS — G35 MULTIPLE SCLEROSIS: Primary | ICD-10-CM

## 2023-01-17 DIAGNOSIS — R32 BOWEL AND BLADDER INCONTINENCE: ICD-10-CM

## 2023-01-17 DIAGNOSIS — G25.3 MYOCLONIC JERKING: ICD-10-CM

## 2023-01-17 DIAGNOSIS — R15.9 BOWEL AND BLADDER INCONTINENCE: ICD-10-CM

## 2023-01-17 DIAGNOSIS — N39.41 URGE INCONTINENCE OF URINE: ICD-10-CM

## 2023-01-17 PROCEDURE — 1159F PR MEDICATION LIST DOCUMENTED IN MEDICAL RECORD: ICD-10-PCS | Mod: CPTII,S$GLB,, | Performed by: STUDENT IN AN ORGANIZED HEALTH CARE EDUCATION/TRAINING PROGRAM

## 2023-01-17 PROCEDURE — 99205 OFFICE O/P NEW HI 60 MIN: CPT | Mod: S$GLB,,, | Performed by: STUDENT IN AN ORGANIZED HEALTH CARE EDUCATION/TRAINING PROGRAM

## 2023-01-17 PROCEDURE — 3008F PR BODY MASS INDEX (BMI) DOCUMENTED: ICD-10-PCS | Mod: CPTII,S$GLB,, | Performed by: STUDENT IN AN ORGANIZED HEALTH CARE EDUCATION/TRAINING PROGRAM

## 2023-01-17 PROCEDURE — 3078F PR MOST RECENT DIASTOLIC BLOOD PRESSURE < 80 MM HG: ICD-10-PCS | Mod: CPTII,S$GLB,, | Performed by: STUDENT IN AN ORGANIZED HEALTH CARE EDUCATION/TRAINING PROGRAM

## 2023-01-17 PROCEDURE — 99999 PR PBB SHADOW E&M-EST. PATIENT-LVL IV: CPT | Mod: PBBFAC,,, | Performed by: STUDENT IN AN ORGANIZED HEALTH CARE EDUCATION/TRAINING PROGRAM

## 2023-01-17 PROCEDURE — 3074F PR MOST RECENT SYSTOLIC BLOOD PRESSURE < 130 MM HG: ICD-10-PCS | Mod: CPTII,S$GLB,, | Performed by: STUDENT IN AN ORGANIZED HEALTH CARE EDUCATION/TRAINING PROGRAM

## 2023-01-17 PROCEDURE — 3074F SYST BP LT 130 MM HG: CPT | Mod: CPTII,S$GLB,, | Performed by: STUDENT IN AN ORGANIZED HEALTH CARE EDUCATION/TRAINING PROGRAM

## 2023-01-17 PROCEDURE — 99999 PR PBB SHADOW E&M-EST. PATIENT-LVL IV: ICD-10-PCS | Mod: PBBFAC,,, | Performed by: STUDENT IN AN ORGANIZED HEALTH CARE EDUCATION/TRAINING PROGRAM

## 2023-01-17 PROCEDURE — 99205 PR OFFICE/OUTPT VISIT, NEW, LEVL V, 60-74 MIN: ICD-10-PCS | Mod: S$GLB,,, | Performed by: STUDENT IN AN ORGANIZED HEALTH CARE EDUCATION/TRAINING PROGRAM

## 2023-01-17 PROCEDURE — 1159F MED LIST DOCD IN RCRD: CPT | Mod: CPTII,S$GLB,, | Performed by: STUDENT IN AN ORGANIZED HEALTH CARE EDUCATION/TRAINING PROGRAM

## 2023-01-17 PROCEDURE — 3078F DIAST BP <80 MM HG: CPT | Mod: CPTII,S$GLB,, | Performed by: STUDENT IN AN ORGANIZED HEALTH CARE EDUCATION/TRAINING PROGRAM

## 2023-01-17 PROCEDURE — 3008F BODY MASS INDEX DOCD: CPT | Mod: CPTII,S$GLB,, | Performed by: STUDENT IN AN ORGANIZED HEALTH CARE EDUCATION/TRAINING PROGRAM

## 2023-01-17 RX ORDER — BACLOFEN 5 MG/1
5 TABLET ORAL NIGHTLY PRN
Qty: 30 TABLET | Refills: 3 | Status: SHIPPED | OUTPATIENT
Start: 2023-01-17 | End: 2023-04-11

## 2023-01-17 NOTE — Clinical Note
Nela Martínez, this is patient of Dr. Urban who is already on Ocrevus, her schedule is Feb-Aug, can you please send her new forms if needed. thank you

## 2023-01-24 ENCOUNTER — PATIENT MESSAGE (OUTPATIENT)
Dept: NEUROLOGY | Facility: CLINIC | Age: 48
End: 2023-01-24
Payer: COMMERCIAL

## 2023-01-26 NOTE — TELEPHONE ENCOUNTER
----- Message from Cintia Kitchen MD sent at 1/17/2023 10:33 AM CST -----  Nela Martínez, this is patient of Dr. Urban who is already on Ocrevus, her schedule is Feb-Aug, can you please send her new forms if needed. thank you

## 2023-01-29 ENCOUNTER — PATIENT MESSAGE (OUTPATIENT)
Dept: NEUROLOGY | Facility: CLINIC | Age: 48
End: 2023-01-29
Payer: COMMERCIAL

## 2023-02-01 ENCOUNTER — HOSPITAL ENCOUNTER (OUTPATIENT)
Dept: RADIOLOGY | Facility: HOSPITAL | Age: 48
Discharge: HOME | End: 2023-02-01
Attending: STUDENT IN AN ORGANIZED HEALTH CARE EDUCATION/TRAINING PROGRAM
Payer: COMMERCIAL

## 2023-02-01 DIAGNOSIS — G35 MULTIPLE SCLEROSIS: ICD-10-CM

## 2023-02-01 PROCEDURE — 72141 MRI NECK SPINE W/O DYE: CPT | Mod: TC

## 2023-02-01 PROCEDURE — 72141 MRI CERVICAL SPINE DEMYELINATING WITHOUT CONTRAST: ICD-10-PCS | Mod: 26,,, | Performed by: RADIOLOGY

## 2023-02-01 PROCEDURE — 72141 MRI NECK SPINE W/O DYE: CPT | Mod: 26,,, | Performed by: RADIOLOGY

## 2023-02-01 PROCEDURE — 70551 MRI BRAIN STEM W/O DYE: CPT | Mod: TC

## 2023-02-01 PROCEDURE — 70551 MRI BRAIN STEM W/O DYE: CPT | Mod: 26,,, | Performed by: RADIOLOGY

## 2023-02-01 PROCEDURE — 72146 MRI CHEST SPINE W/O DYE: CPT | Mod: 26,,, | Performed by: RADIOLOGY

## 2023-02-01 PROCEDURE — 72146 MRI THORACIC SPINE DEMYELINATING WITHOUT CONTRAST: ICD-10-PCS | Mod: 26,,, | Performed by: RADIOLOGY

## 2023-02-01 PROCEDURE — 72146 MRI CHEST SPINE W/O DYE: CPT | Mod: TC

## 2023-02-01 PROCEDURE — 70551 MRI BRAIN DEMYELINATING WITHOUT CONTRAST: ICD-10-PCS | Mod: 26,,, | Performed by: RADIOLOGY

## 2023-02-20 ENCOUNTER — PATIENT MESSAGE (OUTPATIENT)
Dept: PSYCHIATRY | Facility: CLINIC | Age: 48
End: 2023-02-20
Payer: COMMERCIAL

## 2023-03-15 ENCOUNTER — TELEPHONE (OUTPATIENT)
Dept: OPHTHALMOLOGY | Facility: CLINIC | Age: 48
End: 2023-03-15
Payer: COMMERCIAL

## 2023-03-15 NOTE — TELEPHONE ENCOUNTER
----- Message from Eli Bernal sent at 3/15/2023 10:34 AM CDT -----  Regarding: Pt called to cancel/reschedule her 4/12/23 appts and would like a call back  Appointment Access Request:    Pt called to cancel/reschedule her 4/12/23 appts and would like a call back    Pt can be reached at [phone]

## 2023-03-22 ENCOUNTER — OFFICE VISIT (OUTPATIENT)
Dept: UROGYNECOLOGY | Facility: CLINIC | Age: 48
End: 2023-03-22
Payer: COMMERCIAL

## 2023-03-22 DIAGNOSIS — N39.41 URGE INCONTINENCE OF URINE: ICD-10-CM

## 2023-03-22 DIAGNOSIS — N39.46 MIXED INCONTINENCE URGE AND STRESS: Primary | ICD-10-CM

## 2023-03-22 DIAGNOSIS — K59.00 CONSTIPATION, UNSPECIFIED CONSTIPATION TYPE: ICD-10-CM

## 2023-03-22 DIAGNOSIS — R15.1 FECAL SMEARING: ICD-10-CM

## 2023-03-22 DIAGNOSIS — N81.89 PELVIC FLOOR WEAKNESS: ICD-10-CM

## 2023-03-22 DIAGNOSIS — G35 MULTIPLE SCLEROSIS: ICD-10-CM

## 2023-03-22 PROCEDURE — 99203 OFFICE O/P NEW LOW 30 MIN: CPT | Mod: 25,S$GLB,, | Performed by: NURSE PRACTITIONER

## 2023-03-22 PROCEDURE — 51701 INSERT BLADDER CATHETER: CPT | Mod: S$GLB,,, | Performed by: NURSE PRACTITIONER

## 2023-03-22 PROCEDURE — 1160F RVW MEDS BY RX/DR IN RCRD: CPT | Mod: CPTII,S$GLB,, | Performed by: NURSE PRACTITIONER

## 2023-03-22 PROCEDURE — 87086 URINE CULTURE/COLONY COUNT: CPT | Performed by: NURSE PRACTITIONER

## 2023-03-22 PROCEDURE — 99999 PR PBB SHADOW E&M-EST. PATIENT-LVL III: CPT | Mod: PBBFAC,,, | Performed by: NURSE PRACTITIONER

## 2023-03-22 PROCEDURE — 1159F MED LIST DOCD IN RCRD: CPT | Mod: CPTII,S$GLB,, | Performed by: NURSE PRACTITIONER

## 2023-03-22 PROCEDURE — 99999 PR PBB SHADOW E&M-EST. PATIENT-LVL III: ICD-10-PCS | Mod: PBBFAC,,, | Performed by: NURSE PRACTITIONER

## 2023-03-22 PROCEDURE — 99203 PR OFFICE/OUTPT VISIT, NEW, LEVL III, 30-44 MIN: ICD-10-PCS | Mod: 25,S$GLB,, | Performed by: NURSE PRACTITIONER

## 2023-03-22 PROCEDURE — 51701 PR INSERTION OF NON-INDWELLING BLADDER CATHETERIZATION FOR RESIDUAL UR: ICD-10-PCS | Mod: S$GLB,,, | Performed by: NURSE PRACTITIONER

## 2023-03-22 PROCEDURE — 1160F PR REVIEW ALL MEDS BY PRESCRIBER/CLIN PHARMACIST DOCUMENTED: ICD-10-PCS | Mod: CPTII,S$GLB,, | Performed by: NURSE PRACTITIONER

## 2023-03-22 PROCEDURE — 1159F PR MEDICATION LIST DOCUMENTED IN MEDICAL RECORD: ICD-10-PCS | Mod: CPTII,S$GLB,, | Performed by: NURSE PRACTITIONER

## 2023-03-22 NOTE — PATIENT INSTRUCTIONS
1)  Mixed urinary incontinence, urge > stress:    --Empty bladder every 3 hours.  Empty well: wait a minute, lean forward on toilet.    --Avoid dietary irritants (see sheet).  Keep diary x 3-5 days to determine your irritants.  --start pelvic floor PT with [facility] [address] [place] [address] Phone [phone] Fax [phone]  --URGE: consider anticholinergic.  Takes 2-4 weeks to see if will have effect.  For dry mouth: get sour, sugar free lozenge or gum.    --STRESS:  Pessary vs. Sling.   --urine culture    2)Constipation:  --  Start daily fiber.  Take 1 tsp of fiber powder (psyllium or other sugar-free powder).  Mix in 8 oz of water.  Take x 3-5 days.  Then, increase fiber by 1 tsp every 3-5 days until stool is easy to pass.  Stop and continue at that dose.   Do not exceed 6 tsps/day.  May also use over the counter stool softener 1-2 x/day.  AVOID laxatives.    3)RTC 4 months for virtual visit    Bladder Irritants  Certain foods and drinks have been associated with worsening symptoms of urinary frequency, urgency, urge incontinence, or  bladder pain. If you suffer from any of these conditions, you may wish to try eliminating one or more of these foods from your  diet and see if your symptoms improve. If bladder symptoms are related to dietary factors, strict adherence to a diet that  eliminates the food should bring marked relief in 10 days. Once you are feeling better, you can begin to add foods back into  your diet, one at a time. If symptoms return, you will be able to identify the irritant. As you add foods back to your diet it is  very important that you drink significant amounts of water.  Low-acid fruit substitutions include apricots, papaya, pears and watermelon. Coffee drinkers can drink Kava or other lowacid  instant drinks. Tea drinkers can substitute non-citrus herbal and sun brewed teas. Calcium carbonate co-buffered with  calcium ascorbate can be substituted for Vitamin C. Prelief is  a dietary supplement that works as an acid blocker for the  bladder.  Where to get more information:   Overcoming Bladder Disorders by Sharon oDan and Franky Pinto, 1990   You Dont Have to Live with Cystitis! By Kiley Torres, 1988  List of Common Bladder Irritants*  Alcoholic beverages  Apples and apple juice  Cantaloupe  Carbonated beverages  Chili and spicy foods  Chocolate  Citrus fruit  Coffee (including decaffeinated)  Cranberries and cranberry juice  Grapes  Guava  Milk Products: milk, cheese, cottage cheese, yogurt, ice cream  Peaches  Pineapple  Plums  Strawberries  Sugar especially artificial sweeteners, saccharin, aspartame, corn sweeteners, honey, fructose, sucrose, lactose  Tea  Tomatoes and tomato juice  Vitamin B complex  Vinegar  *Most people are not sensitive to ALL of these products; your goal is to find the foods that make YOUR  symptoms worse

## 2023-03-22 NOTE — PROGRESS NOTES
[facility] - OBGYN 5TH FL  [address]  [facility] [address]    Thu Juares  [id number]  1975  2023    Consulting Physician: Cintia Kitchen MD     Primary M.D.: Cintia Kitchen MD    Chief Complaint   Patient presents with    Consult     New patient        HPI:     1)  UI:  (--) TERRI < (+) UUI  X 3years. Occasionally does not make it to the restroom if holding longer than 3-4 hours-- can not stop the flow of urine (+) pads:mostly for protection.  Daytime frequency: Q 3 hours.  Nocturia: Yes: 1/night.   (--) dysuria,  (--) hematuria,  (--) frequent UTIs.  (+) complete bladder emptying.     2)  POP:  Absent. Symptoms:(--)  .  (--) vaginal bleeding. (--) vaginal discharge. (+) sexually active.  (--) dyspareunia.   (--)  Vaginal dryness.  (--) vaginal estrogen use.  Anorgasma--present for the past year    3)  BM:  (+) constipation/straining.  (--) chronic diarrhea. (--) hematochezia.  (--) fecal incontinence.  (+) fecal smearing/urgency.  (--) complete evacuation.      4)MS  --diagnosed in   --typical flare starts with vertigo. No sleeping can cause exacerbaton as well as word finding  --taking ocrevis--last dose in 2023        Past Medical History  Past Medical History:   Diagnosis Date    Hypothyroidism surgical for benign nodules    Multiple sclerosis         Past Surgical History  Past Surgical History:   Procedure Laterality Date     SECTION  10/2013        Hysterectomy: No      Past Ob History    C/s x 2.    Largest infant weight: 8#      Gynecologic History  LMP: Patient's last menstrual period was 2023.  Age of menarche: 15  Age of menopause: n/a  Menstrual history: monthly lasting 5 days-- had btl and ablation  Pap test: 10/2022 normal per report.  History of abnormal paps: Yes - in -- had cryo.  History of STIs:  No  Mammogram: Date of last: 2022.  Result: Normal per patient request  Colonoscopy: Date of last: 2023.  Result:  polyp per patient  report-- repeat in 7 years.    DEXA:  n/a     Family History  Family History   Problem Relation Age of Onset    Thyroid disease Neg Hx     Diabetes Neg Hx       Colon CA: Yes - paternal GM  Breast CA: Yes - sister  GYN CA: No   CA: No    Social History  Social History     Tobacco Use   Smoking Status Never   Smokeless Tobacco Former   .  Social History     Substance and Sexual Activity   Alcohol Use Not Currently   .    Social History     Substance and Sexual Activity   Drug Use Never   .  The patient is .  Resides in [place] [address].  Employment status: currently employed [employer]    Allergies  Review of patient's allergies indicates:   Allergen Reactions    Natalizumab        Medications  Current Outpatient Medications on File Prior to Visit   Medication Sig Dispense Refill    atorvastatin (LIPITOR) 40 MG tablet Take 40 mg by mouth once daily.      baclofen (LIORESAL) 5 mg Tab tablet Take 1 tablet (5 mg total) by mouth nightly as needed (stiffness and muscle cramps). 30 tablet 3    levothyroxine (LEVOXYL) 88 MCG tablet Take 1 tablet (88 mcg total) by mouth before breakfast. 90 tablet 3    ocrelizumab (OCREVUS) 30 mg/mL Soln Every six months.      levocetirizine dihydrochloride (LEVOCETIRIZINE ORAL) Take 5 mg by mouth daily as needed for Allergies.       No current facility-administered medications on file prior to visit.       Review of Systems A 14 point ROS was reviewed with pertinent positives as noted above in the history of present illness.      Constitutional: negative  Eyes: negative  Endocrine: negative  Gastrointestinal: negative  Cardiovascular: negative  Respiratory: negative  Allergic/Immunologic: negative  Integumentary: negative  Psychiatric: negative  Musculoskeletal: negative   Ear/Nose/Throat: negative  Neurologic: negative  Genitourinary: SEE HPI  Hematologic/Lymphatic: negative   Breast: negative    Urogynecologic Exam  LMP 03/21/2023     GENERAL APPEARANCE:  The patient is  well-developed, well-nourished.   Neck:  Supple with no thyromegaly, no carotid bruits.  Heart:  Regular rate and rhythm, no murmurs, rubs or gallops.  Lungs:  Clear.  No CVA tenderness.  Abdomen:  Soft, nontender, nondistended, no hepatosplenomegaly.      PELVIC:    External genitalia:  Normal Bartholins, Skenes and labia bilaterally.    Urethra:  No caruncle, diverticulum or masses.  (--) hypermobility.    Vagina:  Atrophy (--) , no bladder masses or tender, no discharge.    Cervix:  normal appearance  Uterus: normal size, contour, position, consistency, mobility, non-tender  Adnexa: Not palpable.    POP-Q:    No obvious POP present with valsalva.     NEUROLOGIC:  Cranial nerves 2 through 12 intact.  Strength 5/5.  DTRs 2+ lower extremities.  S2 through 4 normal.  Sacral reflexes intact.    EXT: VALENCIA, 2+ pulses bilaterally, no C/C/E    COUGH STRESS TEST:  negative  KEGEL: 2 /5    RECTAL:    External:  Normal, (--) hemorrhoids, (--) dovetailing.   Internal:   deferred    PVR: 20 mL    Impression    1. Mixed incontinence urge and stress    2. Pelvic floor weakness    3. Multiple sclerosis    4. Urge incontinence of urine    5. Fecal smearing    6. Constipation, unspecified constipation type        Initial Plan  The patient was counseled regarding these issues. The patient was given a summary sheet containing each of these issues with possible options for evaluation and management. When appropriate, we also reviewed computer-generated diagrams specific to their diagnoses..  All questions were addressed to the patient's satisfaction.     1)  Mixed urinary incontinence, urge > stress:    --Empty bladder every 3 hours.  Empty well: wait a minute, lean forward on toilet.    --Avoid dietary irritants (see sheet).  Keep diary x 3-5 days to determine your irritants.  --start pelvic floor PT with [facility] [address] [place] [address] Phone [phone] Fax [phone]  --URGE: consider anticholinergic.  Takes 2-4  weeks to see if will have effect.  For dry mouth: get sour, sugar free lozenge or gum.    --STRESS:  Pessary vs. Sling.   --urine culture    2)Constipation:  --  Start daily fiber.  Take 1 tsp of fiber powder (psyllium or other sugar-free powder).  Mix in 8 oz of water.  Take x 3-5 days.  Then, increase fiber by 1 tsp every 3-5 days until stool is easy to pass.  Stop and continue at that dose.   Do not exceed 6 tsps/day.  May also use over the counter stool softener 1-2 x/day.  AVOID laxatives.    3)RTC 4 months for virtual visit    I spent a total of 30 minutes on the day of the visit.  This includes face to face time and non-face to face time preparing to see the patient (eg, review of tests), obtaining and/or reviewing separately obtained history, documenting clinical information in the electronic or other health record, independently interpreting results and communicating results to the patient/family/caregiver, or care coordinator.     Thank you for requesting consultation of your patient.  I look forward to participating in their care.    Cinthya Kumari  Female Pelvic Medicine and Reconstructive Surgery  [facility] [place], [place]

## 2023-03-23 LAB — BACTERIA UR CULT: NO GROWTH

## 2023-03-24 ENCOUNTER — PATIENT MESSAGE (OUTPATIENT)
Dept: UROGYNECOLOGY | Facility: CLINIC | Age: 48
End: 2023-03-24
Payer: COMMERCIAL

## 2023-04-10 ENCOUNTER — PATIENT MESSAGE (OUTPATIENT)
Dept: UROGYNECOLOGY | Facility: CLINIC | Age: 48
End: 2023-04-10
Payer: COMMERCIAL

## 2023-04-10 DIAGNOSIS — N39.46 MIXED INCONTINENCE URGE AND STRESS: Primary | ICD-10-CM

## 2023-04-10 DIAGNOSIS — N81.89 PELVIC FLOOR WEAKNESS: ICD-10-CM

## 2023-05-05 ENCOUNTER — CLINICAL SUPPORT (OUTPATIENT)
Dept: REHABILITATION | Facility: HOSPITAL | Age: 48
End: 2023-05-05
Attending: NURSE PRACTITIONER
Payer: COMMERCIAL

## 2023-05-05 DIAGNOSIS — R27.8 COORDINATION ABNORMAL: ICD-10-CM

## 2023-05-05 DIAGNOSIS — N39.46 MIXED INCONTINENCE URGE AND STRESS: ICD-10-CM

## 2023-05-05 DIAGNOSIS — R53.1 WEAKNESS: Primary | ICD-10-CM

## 2023-05-05 DIAGNOSIS — N81.89 PELVIC FLOOR WEAKNESS: ICD-10-CM

## 2023-05-05 PROCEDURE — 97161 PT EVAL LOW COMPLEX 20 MIN: CPT | Mod: PN | Performed by: PHYSICAL THERAPIST

## 2023-05-05 NOTE — PLAN OF CARE
[facility]   Physical Therapy Initial Evaluation     Date: 2023   Name: Thu Juares  Clinic Number: [id number]    Therapy Diagnosis:   Encounter Diagnoses   Name Primary?    Mixed incontinence urge and stress     Pelvic floor weakness     Weakness Yes    Coordination abnormal      Physician: Cinthya Kumari NP    Physician Orders: PT Eval and Treat PF PT  Medical Diagnosis from Referral:   N39.46 (ICD-10-CM) - Mixed incontinence urge and stress   N81.89 (ICD-10-CM) - Pelvic floor weakness     Evaluation Date: 2023  Authorization Period Expiration: 4/10/24  Plan of Care Expiration: 2023  Progress Note Due: 2023  Visit #  Visits authorized:    FOTO: 1/3    Precautions: Standard     Time In: 2:33 PM   Time Out: 3:19 PM   Total Appointment Time (timed & untimed codes): 46 minutes      HISTORY      Thu is a 48 y.o. female evaluated on 2023    Physician:  Cinthya Kumari NP   Diagnosis:   Encounter Diagnoses   Name Primary?    Mixed incontinence urge and stress     Pelvic floor weakness     Weakness Yes    Coordination abnormal       Treatment ordered: Physical Therapy  Medical History:   Past Medical History:   Diagnosis Date    Hypothyroidism surgical for benign nodules    Multiple sclerosis       Surgical History:   Past Surgical History:   Procedure Laterality Date     SECTION - x 2  10/2013      Medications:   Current Outpatient Medications   Medication Sig    atorvastatin (LIPITOR) 40 MG tablet Take 40 mg by mouth once daily.    baclofen (LIORESAL) 5 mg Tab tablet TAKE 1 TABLET (5 MG TOTAL) BY MOUTH NIGHTLY AS NEEDED (STIFFNESS AND MUSCLE CRAMPS).    levocetirizine dihydrochloride (LEVOCETIRIZINE ORAL) Take 5 mg by mouth daily as needed for Allergies.    levothyroxine (LEVOXYL) 88 MCG tablet Take 1 tablet (88 mcg total) by mouth before breakfast.    ocrelizumab (OCREVUS) 30 mg/mL Soln Every six months - infusion      No current  facility-administered medications for this visit.       Allergies:   Review of patient's allergies indicates:   Allergen Reactions    Natalizumab       Precautions: universal    OB/GYN History:  Caesarean x 2    Bladder/Bowel History: trouble feeling bladder urge/fullness, recent bladder infection, urinary incontinence, constipation/straining for movement, and trouble holding back gas/feces    SUBJECTIVE     Date of onset: about 5 years ago  History of current complaint: Patient states that her MS looks good on paper, but she is tired all the time. Will have constipation, then diarrhea. Limited activity outside of the house with diarrhea. She does not know when it will happen.   Patient's goals for therapy: better bladder control  Pain: Patient reports 0/10, with 0 being the lowest and 10 being the highest.    Activities that cause symptoms: strong urge to go, walking to the toilet, and full bladder    Previous treatment included none     Sexually active? Yes - denies pain     Frequency of urination:   Daytime: 5xs           Nighttime: 2xs    Difficulty initiating urine stream: Yes  Urine stream: strong  Complete emptying: Yes  Bladder leakage: Yes  Frequency of incidents: tries to empty before the urge gets bad, cannot stop the flow once it starts, 2xs a month  Amount leaked (urine):  not leaking due to life style modification    Frequency of bowel movements: once every 2 days  Difficulty initiating BM: Yes  Quality/Shape of BM: [place] Stool Chart 2  Colon leakage: No  Frequency of incidents: none   Amount leaked (bowels):  none  Form of protection: panty liner  Number of pads required in 24 hours: 1  - would take Exlax prior to vacation to clean out, then take Imodium while on vacation    Types of fluid intake: water - tries to drink 1-3 bottles  Diet:regular  Current exercise:not exercising - tried    Occupation: 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    OBJECTIVE     ORTHO SCREEN  Posture: flexed posturing  Pelvic alignment:  not assessed    ABDOMINALS - not assessed    VAGINAL PELVIC FLOOR EXAM -     TREATMENT      Education: instructed on general anatomy/physiology of urinary/bowel system; discussed plan of care with patient and parent/guardian; instructed in benefits/risks of treatment; instructed in alternative methods of treatment; instructed in risks of refusing treatment; patient a parent agreed to treatment plan.     Also educated in: anatomy/physiology of pelvic floor, posture/body mechanices, and fluid intake/dietary modifications    ASSESSMENT      This is a 48 y.o. female referred to outpatient physical therapy and presents with a medical diagnosis of Pelvic floor weakness and Mixed incontinence urge and stress. Patient will benefit from skilled physical therapy to improve core and PF awareness and coordination, improve bowel and bladder habits, and improve QoL.    Educational/Spiritual/Cultural needs: none  Abuse/Neglect: no signs  Nutritional Status: WDWN   Fall Risk: patient is not a fall risk    Pt's spiritual, cultural and educational needs considered and pt agreeable to plan of care and goals as stated below:     Medical necessity is demonstrated by the following IMPAIRMENTS/PROMBLEMS     History  Co-morbidities and personal factors that may impact the plan of care Examination  Body Structures and Functions, activity limitations and participation restrictions that may impact the plan of care Clinical Presentation   Decision Making/ Complexity Score   Co-morbidities:                 Personal Factors:    Body Regions/Systems/Functions:    altered posture, poor knowledge of body mechanics and posture, poor trunk stability, increased tension of the pelvic muscles, poor quality of pelvic muscle contraction, poor coordination of pelvic floor muscles during ADL's leading to urinary or fecal leakage, poor fluid intake, dysfunctional  voiding, and dysfunctional defecation           Activity limitations:   Barriers to Learning: none  Environmental Barriers: none noted    Participation Restrictions:           low     PLAN    Frequency: 1x per week  Duration: 12 weeks    Short Term Goals: 6 weeks   Patient will deny urgency of urine.  Patient will be independent with pelvic floor relaxation to improve bowel and bladder evacuation.   Patient will be able to demonstrate improved pelvic floor relaxation and contraction on rehabilitative ultrasound.   Patient will report feeling the urge to urinate prior to it being too late.   Patient will report improved water intake.     Long Term Goals: 12 weeks   Patient will report urinating every 3-4 hours with strong urine stream.   Patient will deny nocturia.   Patient will demonstrate adequate pelvic floor coordination with contraction and relaxation on sEMG vs rehabilitative ultrasound.   Patient will deny constipation and straining for bowel movements.    Physical therapy will include: therapeutic exercise, manual therapy, therapeutic activities, neuromuscular re-education, manual therapy, modalities PRN, gait training, and self-care education.     Next visit: Pelvic exam with consent, rehabilitative ultrasound       Therapist: Shmuel Saul, PT  5/5/2023

## 2023-05-18 ENCOUNTER — CLINICAL SUPPORT (OUTPATIENT)
Dept: REHABILITATION | Facility: HOSPITAL | Age: 48
End: 2023-05-18
Attending: NURSE PRACTITIONER
Payer: COMMERCIAL

## 2023-05-18 DIAGNOSIS — N81.89 PELVIC FLOOR WEAKNESS: ICD-10-CM

## 2023-05-18 DIAGNOSIS — R53.1 WEAKNESS: ICD-10-CM

## 2023-05-18 DIAGNOSIS — N39.46 MIXED INCONTINENCE URGE AND STRESS: Primary | ICD-10-CM

## 2023-05-18 DIAGNOSIS — R42 DIZZINESS: ICD-10-CM

## 2023-05-18 DIAGNOSIS — R27.8 COORDINATION ABNORMAL: ICD-10-CM

## 2023-05-18 DIAGNOSIS — R26.2 DIFFICULTY IN WALKING: ICD-10-CM

## 2023-05-18 PROCEDURE — 97140 MANUAL THERAPY 1/> REGIONS: CPT | Mod: PN | Performed by: PHYSICAL THERAPIST

## 2023-05-18 PROCEDURE — 97112 NEUROMUSCULAR REEDUCATION: CPT | Mod: PN | Performed by: PHYSICAL THERAPIST

## 2023-05-18 NOTE — PATIENT INSTRUCTIONS
DIAPHRAGMATIC BREATHING     The diaphragm is a dome shaped muscle that forms the floor of the rib cage. It is the most efficient muscle for breathing and relaxation, although most people are not used to using the diaphragm. Diaphragmatic or belly breathing is an important technique to learn because it helps settle down or relax the autonomic nervous system. The correct use of diaphragmatic breathing can help to quiet brain activity resulting in the relaxation of all the muscles and organs of the body. This is accomplished by slow rhythmic breathing concentrated in the diaphragm muscle rather than the chest.    How to do proper relaxation breathing:    Start by lying on your back or reclining in a chair in a relaxed position. Place one hand on your chest and the other on your abdomen.  Relax your jaw by placing your tongue on the floor of your mouth and keeping your teeth slightly apart.   Take a deep breath in, letting the abdomen expand and rise while you keep your upper chest, neck and shoulders relaxed.   As you breathe out, allow your abdomen and chest to fall. Exhale completely.  It doesn't matter if you breathe in/out through your nose and/or mouth. Do whichever feels comfortable.  Remember to breathe slowly.  Do not force your breathing. Do not hold your breath.  Repeat for 5 minutes every day.         Pelvic floor relaxation:   - occurs when urination, bowel movement, vaginal penetration, or passing gas  - every hour - tune into your pelvic floor and relax   - do not hold tension in the hips  - with breathing - let the diaphragm descend and the pelvic floor descend       Posture:   - pelvic neutral - not too far forward, not too far back     Concentrated urine wants to get out of the bladder - hydration is very important!    Look into a squatty potty     Increase pads if you feel like you need    Place two fingers just in front of the rectal opening and feel the tissues - with big breath you should feel the  tissues move down and out

## 2023-05-18 NOTE — PROGRESS NOTES
Pelvic Health Physical Therapy   Treatment Note     Name: Thu PEREZ Lukianna  Clinic Number: [id number]    Therapy Diagnosis:   Encounter Diagnoses   Name Primary?    Mixed incontinence urge and stress Yes    Pelvic floor weakness     Weakness     Coordination abnormal     Dizziness     Difficulty in walking      Physician: Cinthya Kumari NP    Visit Date: 5/18/2023    Physician Orders: PT Eval and Treat PF PT  Medical Diagnosis from Referral:   N39.46 (ICD-10-CM) - Mixed incontinence urge and stress   N81.89 (ICD-10-CM) - Pelvic floor weakness      Evaluation Date: 5/5/2023  Authorization Period Expiration: 4/10/24  Plan of Care Expiration: 7/28/2023  Progress Note Due: 6/2/2023  Visit # 1/12 Visits authorized: 1/ 1   FOTO: 1/3  Cancelled Visits: 0  No Show Visits: 0    Time In: 8:51 AM   Time Out: 9:34 AM   Total Billable Time: 43 minutes    Precautions: Standard    Subjective     Pt reports: no changes in bladder function.  She was compliant with home exercise program.  Response to previous treatment: none  Functional change: none    Pain in:  0/10  Pain out: 0/10  Location: none      Objective     Thu received the following manual therapy techniques: x 10 min  VAGINAL PELVIC FLOOR EXAM    EXTERNAL ASSESSMENT  Introitus: stenotic  Skin condition: WNL  Scarring: none   Sensation: WNL   Pain:  none  Voluntary contraction: nil  Voluntary relaxation: nil  Involuntary contraction: nil  Bearing down: nil  Perineal descent: absent      INTERNAL ASSESSMENT  Pain: none   Sensation: WNL  Vaginal vault: stenotic   Muscle Bulk: hypertonus   Muscle Power: 0/5  Muscle Endurance: 0 sec  # Reps To Fatigue: 0    Fast Contractions: 0     Quality of contraction: incomplete relaxation   Specificity: WNL   Coordination: limited pelvic floor and core awareness   Prolapse check: none  Comments: no pain with vaginal exam    Thu participated in neuromuscular re-education activities to develop Coordination, Down training, and  Proprioception for 33 minutes including: pelvic floor downtraining with assist of RUSI  and rehabilitative ultrasound imaging to help visualize pelvic floor muscle contraction and relaxation with kegels         Intervention Eval  05/18/2023           Neuro Re-Ed Rehabilitative ultrasound   PF down training     Manual Ther Vaginal exam    PF tension    TherAct             Home Exercises Provided and Patient Education Provided     Education provided:   - anatomy/physiology of pelvic floor and posture/body mechanices  Discussed progression of plan of care with patient; educated pt in activity modification; reviewed HEP with pt. Pt demonstrated and verbalized understanding of all instruction and was provided with a handout of HEP (see Patient Instructions).    Written Home Exercises Provided: yes.  Exercises were reviewed and Thu was able to demonstrate them prior to the end of the session.  Thu demonstrated good  understanding of the education provided.     See EMR under Patient Instructions for exercises provided 05/18/2023 .    Assessment     Patient with good understanding of physical therapist recommendations. Limited PF awareness and mobility.   Thu Is progressing well towards her goals.   Pt prognosis is Excellent.     Pt will continue to benefit from skilled outpatient physical therapy to address the deficits listed in the problem list box on initial evaluation, provide pt/family education and to maximize pt's level of independence in the home and community environment.     Pt's spiritual, cultural and educational needs considered and pt agreeable to plan of care and goals.     Anticipated barriers to physical therapy: none     Short Term Goals: 6 weeks   Patient will deny urgency of urine.  Patient will be independent with pelvic floor relaxation to improve bowel and bladder evacuation.   Patient will be able to demonstrate improved pelvic floor relaxation and contraction on rehabilitative ultrasound.    Patient will report feeling the urge to urinate prior to it being too late.   Patient will report improved water intake.      Long Term Goals: 12 weeks   Patient will report urinating every 3-4 hours with strong urine stream.   Patient will deny nocturia.   Patient will demonstrate adequate pelvic floor coordination with contraction and relaxation on sEMG vs rehabilitative ultrasound.   Patient will deny constipation and straining for bowel movements.    Plan     Continue with progressive pelvic floor coordination training.     Shmuel Saul, PT

## 2023-05-22 ENCOUNTER — CLINICAL SUPPORT (OUTPATIENT)
Dept: REHABILITATION | Facility: HOSPITAL | Age: 48
End: 2023-05-22
Attending: NURSE PRACTITIONER
Payer: COMMERCIAL

## 2023-05-22 DIAGNOSIS — R26.2 DIFFICULTY IN WALKING: ICD-10-CM

## 2023-05-22 DIAGNOSIS — N39.46 MIXED INCONTINENCE URGE AND STRESS: ICD-10-CM

## 2023-05-22 DIAGNOSIS — N81.89 PELVIC FLOOR WEAKNESS: Primary | ICD-10-CM

## 2023-05-22 DIAGNOSIS — R53.1 WEAKNESS: ICD-10-CM

## 2023-05-22 DIAGNOSIS — R27.8 COORDINATION ABNORMAL: ICD-10-CM

## 2023-05-22 DIAGNOSIS — R42 DIZZINESS: ICD-10-CM

## 2023-05-22 PROCEDURE — 97112 NEUROMUSCULAR REEDUCATION: CPT | Mod: PN | Performed by: PHYSICAL THERAPIST

## 2023-05-22 PROCEDURE — 97530 THERAPEUTIC ACTIVITIES: CPT | Mod: PN | Performed by: PHYSICAL THERAPIST

## 2023-05-22 NOTE — PROGRESS NOTES
Pelvic Health Physical Therapy   Treatment Note     Name: Thu Juares  Clinic Number: [id number]    Therapy Diagnosis:   Encounter Diagnoses   Name Primary?    Pelvic floor weakness Yes    Mixed incontinence urge and stress     Weakness     Coordination abnormal     Dizziness     Difficulty in walking      Physician: Cinthya Kumari NP    Visit Date: 5/22/2023    Physician Orders: PT Eval and Treat PF PT  Medical Diagnosis from Referral:   N39.46 (ICD-10-CM) - Mixed incontinence urge and stress   N81.89 (ICD-10-CM) - Pelvic floor weakness      Evaluation Date: 5/5/2023  Authorization Period Expiration: 4/10/24  Plan of Care Expiration: 7/28/2023  Progress Note Due: 6/2/2023  Visit # 3/12 Visits authorized: 2/24   FOTO: 1/3  Cancelled Visits: 0  No Show Visits: 0    Time In: 11:12 AM   Time Out: 11:51 AM   Total Billable Time: 39 minutes    Precautions: Standard    Subjective     Pt reports: Got a shot this morning for sinus infection.     She was compliant with home exercise program.  Response to previous treatment: none  Functional change: none    Pain in:  0/10  Pain out: 0/10  Location: none      Objective     Thu participated in neuromuscular re-education activities to develop Coordination, Down training, and Proprioception for 29 minutes including: pelvic floor downtraining with assist of RUSI  and rehabilitative ultrasound imaging to help visualize pelvic floor muscle contraction and relaxation with kegels. Educated on ways to improve her PF awareness. I feel as though the lack of sensation will challenge her progress. She was educated on the gastro colic reflex and encouraged to attend to the sensation even if slight when needing to move bowels.     Thu participated in dynamic functional therapeutic activities to improve functional performance for 10  minutes, including:  She was educated on the gastro colic reflex and encouraged to attend to the sensation even if slight when needing to move bowels.           Intervention Eval  05/18/2023 05/22/2023           Neuro Re-Ed Rehabilitative ultrasound   PF down training  PF coordination   Manual Ther Vaginal exam    PF tension    TherAct Gastro Colic Relfex     x       Home Exercises Provided and Patient Education Provided     Education provided:   - anatomy/physiology of pelvic floor and posture/body mechanices  Discussed progression of plan of care with patient; educated pt in activity modification; reviewed HEP with pt. Pt demonstrated and verbalized understanding of all instruction and was provided with a handout of HEP (see Patient Instructions).    Written Home Exercises Provided: yes.  Exercises were reviewed and Thu was able to demonstrate them prior to the end of the session.  Thu demonstrated good  understanding of the education provided.     See EMR under Patient Instructions for exercises provided 05/22/2023 .    Assessment     Patient with good understanding of physical therapist recommendations. Limited PF awareness and mobility.   Thu Is progressing well towards her goals.   Pt prognosis is Excellent.     Pt will continue to benefit from skilled outpatient physical therapy to address the deficits listed in the problem list box on initial evaluation, provide pt/family education and to maximize pt's level of independence in the home and community environment.     Pt's spiritual, cultural and educational needs considered and pt agreeable to plan of care and goals.     Anticipated barriers to physical therapy: none     Short Term Goals: 6 weeks   Patient will deny urgency of urine.  Patient will be independent with pelvic floor relaxation to improve bowel and bladder evacuation.   Patient will be able to demonstrate improved pelvic floor relaxation and contraction on rehabilitative ultrasound.   Patient will report feeling the urge to urinate prior to it being too late.   Patient will report improved water intake.      Long Term Goals: 12 weeks    Patient will report urinating every 3-4 hours with strong urine stream.   Patient will deny nocturia.   Patient will demonstrate adequate pelvic floor coordination with contraction and relaxation on sEMG vs rehabilitative ultrasound.   Patient will deny constipation and straining for bowel movements.    Plan     Continue with progressive pelvic floor coordination training.     Shmuel Saul, PT

## 2023-05-22 NOTE — PATIENT INSTRUCTIONS
Gastro colic reflex: morning routine  Wake up - drink some water   Go urinate if you need  Return to bed or a chair - comfortable   Abdominal massage using 3 flat fingers - ILU - 2-3 min as often as you need  Do not suppress the urge for a bowel movement - ever  15 min after you are awake - NOTICE the urge for a bowel movement  Attempt to have BM - Put your feet on a stool  Relax the pelvic floor do not strain - imagine the rectum like a flower blooming

## 2023-06-01 ENCOUNTER — CLINICAL SUPPORT (OUTPATIENT)
Dept: REHABILITATION | Facility: HOSPITAL | Age: 48
End: 2023-06-01
Attending: NURSE PRACTITIONER
Payer: COMMERCIAL

## 2023-06-01 DIAGNOSIS — R53.1 WEAKNESS: ICD-10-CM

## 2023-06-01 DIAGNOSIS — R27.8 COORDINATION ABNORMAL: ICD-10-CM

## 2023-06-01 DIAGNOSIS — N39.46 MIXED INCONTINENCE URGE AND STRESS: ICD-10-CM

## 2023-06-01 DIAGNOSIS — N81.89 PELVIC FLOOR WEAKNESS: Primary | ICD-10-CM

## 2023-06-01 DIAGNOSIS — R42 DIZZINESS: ICD-10-CM

## 2023-06-01 PROCEDURE — 97112 NEUROMUSCULAR REEDUCATION: CPT | Mod: PN | Performed by: PHYSICAL THERAPIST

## 2023-06-01 PROCEDURE — 97530 THERAPEUTIC ACTIVITIES: CPT | Mod: PN | Performed by: PHYSICAL THERAPIST

## 2023-06-01 NOTE — PATIENT INSTRUCTIONS
**Abdominal strength:   - inhale - open the lower ribs  - exhale - draw the lower ribs inward  - do not hold your breath - no one should know what you are doing   - 3x10, 10 sec hold    Keep your box shut  - if you generate pressure in your abdomen with the urge to pee you will push the urine     Pelvic floor relaxation: gentle feeling of down and out  - occurs when urination, bowel movement, vaginal penetration, or passing gas  - every hour - tune into your pelvic floor and relax   - do not hold tension in the hips  - with breathing - let the diaphragm descend and the pelvic floor descend     Pelvic floor contraction: use this to help with leakage of urine  Gentle feeling of up and in   Close your box  Do not hold your breath   Inhale, exhale, gently bring your belly button to your back, then pelvic floor up and in   Hold for 10 seconds  NO one should know what you are doing

## 2023-06-01 NOTE — PROGRESS NOTES
Pelvic Health Physical Therapy   Treatment Note     Name: Thu Juares  Clinic Number: [id number]    Therapy Diagnosis:   No diagnosis found.    Physician: Cinthya Kumari NP    Visit Date: 6/1/2023    Physician Orders: PT Eval and Treat PF PT  Medical Diagnosis from Referral:   N39.46 (ICD-10-CM) - Mixed incontinence urge and stress   N81.89 (ICD-10-CM) - Pelvic floor weakness      Evaluation Date: 5/5/2023  Authorization Period Expiration: 4/10/24  Plan of Care Expiration: 7/28/2023  Progress Note Due: 6/2/2023  Visit # 4/12 Visits authorized: 2/24   FOTO: 1/3  Cancelled Visits: 0  No Show Visits: 0    Time In: 2:40 PM   Time Out: 3:20 PM   Total Billable Time: 40 minutes    Precautions: Standard    Subjective     Pt reports: Sinuses are better. She states she was out of town so did not do her home program. She did leak 2 times while she was out town. Had trouble with her bowels on vacation - went 5 days without a bowel movement. She did move yesterday, but has not moved today. She did do her abdominal massage.     She was compliant with home exercise program.  Response to previous treatment: none  Functional change: none    Pain in:  0/10  Pain out: 0/10  Location: none      Objective     Thu participated in neuromuscular re-education activities to develop Coordination, Down training, and Proprioception for 30 minutes including: pelvic floor downtraining with assist of RUSI  and rehabilitative ultrasound imaging to help visualize pelvic floor muscle contraction and relaxation with kegels. Improved pelvic floor mobility. Still with limited PF activation and awareness. Used rehabilitative ultrasound to assist with TA activation. Was able to perform with decreased intra-abdominal pressure with practice.     Thu participated in dynamic functional therapeutic activities to improve functional performance for 10  minutes, including:  Physical therapist performed abdominal massage and educated on bowel habits  with traveling.        Intervention Eval  05/18/2023 05/22/2023 06/01/2023            Neuro Re-Ed Rehabilitative ultrasound   PF down training  PF coordination pelvic floor downtraining with assist of RUSI  and rehabilitative ultrasound imaging to help visualize pelvic floor muscle contraction and relaxation with kegels. Improved pelvic floor mobility. Still with limited PF activation and awareness. Used rehabilitative ultrasound to assist with TA activation. Was able to perform with decreased intra-abdominal pressure with practice.    Manual Ther Vaginal exam   PF tension     TherAct Gastro Colic Relfex     x Physical therapist performed abdominal massage and educated on bowel habits with traveling.        Home Exercises Provided and Patient Education Provided     Education provided:   - anatomy/physiology of pelvic floor and posture/body mechanices  Discussed progression of plan of care with patient; educated pt in activity modification; reviewed HEP with pt. Pt demonstrated and verbalized understanding of all instruction and was provided with a handout of HEP (see Patient Instructions).    Written Home Exercises Provided: yes.  Exercises were reviewed and Thu was able to demonstrate them prior to the end of the session.  Thu demonstrated good  understanding of the education provided.     See EMR under Patient Instructions for exercises provided 06/01/2023 .    Assessment     Patient with good understanding of physical therapist recommendations. Limited PF awareness and mobility.   Thu Is progressing well towards her goals.   Pt prognosis is Excellent.     Pt will continue to benefit from skilled outpatient physical therapy to address the deficits listed in the problem list box on initial evaluation, provide pt/family education and to maximize pt's level of independence in the home and community environment.     Pt's spiritual, cultural and educational needs considered and pt agreeable to plan of care and  goals.     Anticipated barriers to physical therapy: none     Short Term Goals: 6 weeks   Patient will deny urgency of urine.  Patient will be independent with pelvic floor relaxation to improve bowel and bladder evacuation.   Patient will be able to demonstrate improved pelvic floor relaxation and contraction on rehabilitative ultrasound.   Patient will report feeling the urge to urinate prior to it being too late.   Patient will report improved water intake.      Long Term Goals: 12 weeks   Patient will report urinating every 3-4 hours with strong urine stream.   Patient will deny nocturia.   Patient will demonstrate adequate pelvic floor coordination with contraction and relaxation on sEMG vs rehabilitative ultrasound.   Patient will deny constipation and straining for bowel movements.    Plan     Continue with progressive pelvic floor coordination training. Will plan for PF biofeedback with e-stim next visit with mindfulness of limited pelvic floor sensation.     Shmuel Saul, PT

## 2023-06-05 ENCOUNTER — CLINICAL SUPPORT (OUTPATIENT)
Dept: REHABILITATION | Facility: HOSPITAL | Age: 48
End: 2023-06-05
Attending: NURSE PRACTITIONER
Payer: COMMERCIAL

## 2023-06-05 DIAGNOSIS — E89.0 POST-SURGICAL HYPOTHYROIDISM: ICD-10-CM

## 2023-06-05 PROCEDURE — 97112 NEUROMUSCULAR REEDUCATION: CPT | Mod: PN | Performed by: PHYSICAL THERAPIST

## 2023-06-05 RX ORDER — LEVOTHYROXINE SODIUM 88 UG/1
TABLET ORAL
Qty: 90 TABLET | Refills: 3 | Status: SHIPPED | OUTPATIENT
Start: 2023-06-05

## 2023-06-05 NOTE — PROGRESS NOTES
Pelvic Health Physical Therapy   Treatment Note     Name: Thu Juares  Clinic Number: [id number]    Therapy Diagnosis:   No diagnosis found.    Physician: Cinthya Kumari NP    Visit Date: 6/5/2023    Physician Orders: PT Eval and Treat PF PT  Medical Diagnosis from Referral:   N39.46 (ICD-10-CM) - Mixed incontinence urge and stress   N81.89 (ICD-10-CM) - Pelvic floor weakness      Evaluation Date: 5/5/2023  Authorization Period Expiration: 4/10/24  Plan of Care Expiration: 7/28/2023  Progress Note Due: 6/2/2023  Visit # 5/12 Visits authorized: 3/24   FOTO: 1/3  Cancelled Visits: 0  No Show Visits: 0    Time In: 8:50 AM  Time Out: 9:35 AM    Total Billable Time: 45 minutes    Precautions: Standard    Subjective     Pt reports: She cleaned all weekend. She does report some mild back pain. States that she feels like she needs to clean out her bowels.     She was compliant with home exercise program.  Response to previous treatment: none  Functional change: none    Pain in:  0/10  Pain out: 0/10  Location: none      Objective     Thu participated in neuromuscular re-education activities to develop Coordination, Down training, and Proprioception for 45 minutes including: core and pelvic floor strength and coordination training. Provided with home exercise program. Educated on core awareness with functional mobility. Provided tactile cues for core awareness. Educated on quick core activation with throat clearing. Educated on management of abdominal pressure with functional mobility. Also educated on timed voiding with use of a timer.        Intervention Eval  05/18/2023 05/22/2023 06/01/2023 06/05/2023             Neuro Re-Ed Rehabilitative ultrasound   PF down training  PF coordination pelvic floor downtraining with assist of RUSI  and rehabilitative ultrasound imaging to help visualize pelvic floor muscle contraction and relaxation with kegels. Improved pelvic floor mobility. Still with limited PF activation  and awareness. Used rehabilitative ultrasound to assist with TA activation. Was able to perform with decreased intra-abdominal pressure with practice.     Manual Ther Vaginal exam   PF tension      TherAct Gastro Colic Relfex     x Physical therapist performed abdominal massage and educated on bowel habits with traveling.     Neuro Re-ed Core coordination     core and pelvic floor strength and coordination training. Provided with home exercise program. Educated on core awareness with functional mobility. Provided tactile cues for core awareness. Educated on quick core activation with throat clearing.        Home Exercises Provided and Patient Education Provided     Education provided:   - anatomy/physiology of pelvic floor and posture/body mechanices  Discussed progression of plan of care with patient; educated pt in activity modification; reviewed HEP with pt. Pt demonstrated and verbalized understanding of all instruction and was provided with a handout of HEP (see Patient Instructions).    Written Home Exercises Provided: yes.  Exercises were reviewed and Thu was able to demonstrate them prior to the end of the session.  Thu demonstrated good  understanding of the education provided.     See EMR under Patient Instructions for exercises provided 06/05/2023 .    Assessment     Patient with good understanding of physical therapist recommendations. Improved core awareness today.   Thu Is progressing well towards her goals.   Pt prognosis is Excellent.     Pt will continue to benefit from skilled outpatient physical therapy to address the deficits listed in the problem list box on initial evaluation, provide pt/family education and to maximize pt's level of independence in the home and community environment.     Pt's spiritual, cultural and educational needs considered and pt agreeable to plan of care and goals.     Anticipated barriers to physical therapy: none     Short Term Goals: 6 weeks   Patient will deny  urgency of urine.  Patient will be independent with pelvic floor relaxation to improve bowel and bladder evacuation.   Patient will be able to demonstrate improved pelvic floor relaxation and contraction on rehabilitative ultrasound.   Patient will report feeling the urge to urinate prior to it being too late.   Patient will report improved water intake.      Long Term Goals: 12 weeks   Patient will report urinating every 3-4 hours with strong urine stream.   Patient will deny nocturia.   Patient will demonstrate adequate pelvic floor coordination with contraction and relaxation on sEMG vs rehabilitative ultrasound.   Patient will deny constipation and straining for bowel movements.    Plan     Continue with progressive pelvic floor coordination training. Will plan for PF biofeedback with e-stim next visit with mindfulness of limited pelvic floor sensation.     Shmuel Saul, PT

## 2023-06-05 NOTE — PATIENT INSTRUCTIONS
Core:   - helps move the stool through the intestines - the longer it sits the harder and larger it gets  - with dynamic core exercises - manage abdominal pressure - I do not want the feeling of your belly bulging out (this pushes the urine out) - Keep your box shut  - do not hold your breath   - sensation of zipping a tight pair of pants!    When your back hurts = use the core more    Quick flicks - cough, sneeze, throat clear  - generate the force inward       Timed voiding - urinate every 3 hours - if still wet, decrease to every 2 hours or less until you are dry     Prevent leakage before it starts by closing your box - belly button back and pelvic floor up and in

## 2023-06-12 ENCOUNTER — LAB VISIT (OUTPATIENT)
Dept: LAB | Facility: HOSPITAL | Age: 48
End: 2023-06-12
Attending: NURSE PRACTITIONER
Payer: COMMERCIAL

## 2023-06-12 ENCOUNTER — CLINICAL SUPPORT (OUTPATIENT)
Dept: REHABILITATION | Facility: HOSPITAL | Age: 48
End: 2023-06-12
Attending: NURSE PRACTITIONER
Payer: COMMERCIAL

## 2023-06-12 ENCOUNTER — TELEPHONE (OUTPATIENT)
Dept: UROGYNECOLOGY | Facility: CLINIC | Age: 48
End: 2023-06-12
Payer: COMMERCIAL

## 2023-06-12 DIAGNOSIS — N81.89 PELVIC FLOOR WEAKNESS: Primary | ICD-10-CM

## 2023-06-12 DIAGNOSIS — R53.1 WEAKNESS: ICD-10-CM

## 2023-06-12 DIAGNOSIS — N39.46 MIXED INCONTINENCE URGE AND STRESS: ICD-10-CM

## 2023-06-12 DIAGNOSIS — N39.41 URGE INCONTINENCE OF URINE: Primary | ICD-10-CM

## 2023-06-12 DIAGNOSIS — N39.41 URGE INCONTINENCE OF URINE: ICD-10-CM

## 2023-06-12 LAB
BILIRUB UR QL STRIP: NEGATIVE
CLARITY UR: CLEAR
COLOR UR: YELLOW
GLUCOSE UR QL STRIP: NEGATIVE
HGB UR QL STRIP: ABNORMAL
KETONES UR QL STRIP: NEGATIVE
LEUKOCYTE ESTERASE UR QL STRIP: NEGATIVE
NITRITE UR QL STRIP: NEGATIVE
PH UR STRIP: 6 [PH] (ref 5–8)
PROT UR QL STRIP: NEGATIVE
SP GR UR STRIP: 1.02 (ref 1–1.03)
URN SPEC COLLECT METH UR: ABNORMAL
UROBILINOGEN UR STRIP-ACNC: NEGATIVE EU/DL

## 2023-06-12 PROCEDURE — 87086 URINE CULTURE/COLONY COUNT: CPT | Performed by: NURSE PRACTITIONER

## 2023-06-12 PROCEDURE — 97530 THERAPEUTIC ACTIVITIES: CPT | Mod: PN | Performed by: PHYSICAL THERAPIST

## 2023-06-12 PROCEDURE — 81003 URINALYSIS AUTO W/O SCOPE: CPT | Performed by: NURSE PRACTITIONER

## 2023-06-12 PROCEDURE — 87088 URINE BACTERIA CULTURE: CPT | Performed by: NURSE PRACTITIONER

## 2023-06-12 PROCEDURE — 87186 SC STD MICRODIL/AGAR DIL: CPT | Performed by: NURSE PRACTITIONER

## 2023-06-12 PROCEDURE — 87077 CULTURE AEROBIC IDENTIFY: CPT | Performed by: NURSE PRACTITIONER

## 2023-06-12 NOTE — PROGRESS NOTES
Pelvic Health Physical Therapy   Treatment Note     Name: Thu Juares  Clinic Number: [id number]    Therapy Diagnosis:   Encounter Diagnoses   Name Primary?    Pelvic floor weakness Yes    Mixed incontinence urge and stress     Weakness        Physician: Cinthya Kumari NP    Visit Date: 6/12/2023    Physician Orders: PT Eval and Treat PF PT  Medical Diagnosis from Referral:   N39.46 (ICD-10-CM) - Mixed incontinence urge and stress   N81.89 (ICD-10-CM) - Pelvic floor weakness      Evaluation Date: 5/5/2023  Authorization Period Expiration: 4/10/24  Plan of Care Expiration: 7/28/2023  Progress Note Due: 6/2/2023  Visit # 5/12 Visits authorized: 5/24   FOTO: 1/3  Cancelled Visits: 0  No Show Visits: 0    Time In: 8:55 AM  Time Out: 9:35 AM    Total Billable Time: 40 minutes    Precautions: Standard    Subjective     Pt reports:  Leaked after getting out of the shower. Urgency. Back pain. Feels as though she may have another UTI. I messaged Dr. Kumari's team and orders were placed for a UA and UC.     She was compliant with home exercise program.  Response to previous treatment: none  Functional change: none    Pain in:  0/10 mild back pain  Pain out: 0/10  Location: none      Objective   Thu participated in dynamic functional therapeutic activities to improve functional performance for 40  minutes, including:  Encouraged improved bowel habits. States that her stool is soft, just hard to pass. Educated on water intake, abdominal massage, use of squatty potty. Educated pt on the fact that her impaired sensation could be making bowel and bladder evacuation a challenge.          Intervention Eval  05/18/2023 05/22/2023 06/01/2023 06/05/2023 06/12/2023              Neuro Re-Ed Rehabilitative ultrasound   PF down training  PF coordination pelvic floor downtraining with assist of RUSI  and rehabilitative ultrasound imaging to help visualize pelvic floor muscle contraction and relaxation with kegels. Improved  pelvic floor mobility. Still with limited PF activation and awareness. Used rehabilitative ultrasound to assist with TA activation. Was able to perform with decreased intra-abdominal pressure with practice.      Manual Ther Vaginal exam   PF tension       TherAct Gastro Colic Relfex     x Physical therapist performed abdominal massage and educated on bowel habits with traveling.      Neuro Re-ed Core coordination     core and pelvic floor strength and coordination training. Provided with home exercise program. Educated on core awareness with functional mobility. Provided tactile cues for core awareness. Educated on quick core activation with throat clearing.     TherAct Education      Encouraged improved bowel habits. States that her stool is soft, just hard to pass. Educated on water intake, abdominal massage, use of squatty potty. Educated pt on the fact that her impaired sensation could be making bowel and bladder evacuation a challenge.          Home Exercises Provided and Patient Education Provided     Education provided:   - anatomy/physiology of pelvic floor and posture/body mechanices  Discussed progression of plan of care with patient; educated pt in activity modification; reviewed HEP with pt. Pt demonstrated and verbalized understanding of all instruction and was provided with a handout of HEP (see Patient Instructions).    Written Home Exercises Provided: yes.  Exercises were reviewed and Thu was able to demonstrate them prior to the end of the session.  Thu demonstrated good  understanding of the education provided.     See EMR under Patient Instructions for exercises provided 06/12/2023 .    Assessment     Encouraged pelvic floor mobility to assist with bowel evacuation.    Thu Is progressing well towards her goals.   Pt prognosis is Excellent.     Pt will continue to benefit from skilled outpatient physical therapy to address the deficits listed in the problem list box on initial evaluation,  provide pt/family education and to maximize pt's level of independence in the home and community environment.     Pt's spiritual, cultural and educational needs considered and pt agreeable to plan of care and goals.     Anticipated barriers to physical therapy: none     Short Term Goals: 6 weeks   Patient will deny urgency of urine.  Patient will be independent with pelvic floor relaxation to improve bowel and bladder evacuation.   Patient will be able to demonstrate improved pelvic floor relaxation and contraction on rehabilitative ultrasound.   Patient will report feeling the urge to urinate prior to it being too late.   Patient will report improved water intake.      Long Term Goals: 12 weeks   Patient will report urinating every 3-4 hours with strong urine stream.   Patient will deny nocturia.   Patient will demonstrate adequate pelvic floor coordination with contraction and relaxation on sEMG vs rehabilitative ultrasound.   Patient will deny constipation and straining for bowel movements.    Plan     Continue with progressive pelvic floor coordination training. Will plan for PF biofeedback with e-stim next visit with mindfulness of limited pelvic floor sensation. PF down training and manual therapy. Check urine results.    Shmuel Saul, PT

## 2023-06-12 NOTE — TELEPHONE ENCOUNTER
----- Message from Shmuel Saul, PT sent at 6/12/2023  9:01 AM CDT -----  Regarding: UA  I am with Thu right now. She is having urgency of urine and back pain. She feels as though she may have a UTI. Can you put in orders for a UA and she can drop off urine while she is here. Thanks!

## 2023-06-12 NOTE — PATIENT INSTRUCTIONS
Bowels:  - good water intake every day - even on the weekends  - put your feet on a stool - squatty potty  - do your deep breathing to relax the pelvic floor  - can also give pressure just inside the sit bones to help with pelvic floor relaxation   - regular diaphragmatic breathing   - abdominal massage regularly     If needed:   - Miralax - can adjust dose to what is comfortable

## 2023-06-13 ENCOUNTER — PATIENT MESSAGE (OUTPATIENT)
Dept: UROGYNECOLOGY | Facility: CLINIC | Age: 48
End: 2023-06-13
Payer: COMMERCIAL

## 2023-06-13 DIAGNOSIS — N30.00 ACUTE CYSTITIS WITHOUT HEMATURIA: Primary | ICD-10-CM

## 2023-06-13 RX ORDER — NITROFURANTOIN 25; 75 MG/1; MG/1
100 CAPSULE ORAL 2 TIMES DAILY
Qty: 14 CAPSULE | Refills: 0 | Status: SHIPPED | OUTPATIENT
Start: 2023-06-13 | End: 2024-02-07

## 2023-06-14 LAB — BACTERIA UR CULT: ABNORMAL

## 2023-06-14 NOTE — PROGRESS NOTES
"    Date:  6/15/2023    ?  Referring Provider:   Cintia Kitchen MD    Copies of Letters to the Following:   Cintia Kitchen MD    Chief Complaint:  I saw Thu Juares at the [facility] for neuro-ophthalmic evaluation.   She is a 48 y.o. female with a history of HLD, hypothyroidism, MS on Ocrevus, who presents to establish in neuro-ophthalmology clinic.     History:     HPI    Referred: Dr. Kitchen    49 y/o female present to clinic for Neuro-ophthalmic evaluation for   history of MS. Pt states last eye exam was 6/2022. Pt present to clinic   with concerns of night blindness. She is glare sensitivity. She denies   vision loss, ocular, pain, color defects, flashes of lights, headaches,   migraines, diplopia, or tinnitus reported. H/o of lasik, bilateral- 10   years ago.      Eyemeds  No gtts  Last edited by Loyd Mcgee on 6/15/2023  1:14 PM.        1/17/2023 Fidelina (MS):  "   Symptom History:  Aug/2004 - she was 7 months postpartum, was cutting the grass in the heat outside. Developed an acute episode of vertigo and N/V. It was initially treated symptomatically, her ENT obtained and MRI since symptoms did not resolve. She was diagnosed with MS with enhancing and non-enhancing brain lesions and abnormal visual evoked potentials. She had another relapse probably around Oct 2004. MRI of cord or spinal tap was not performed at the time. She was started on Betaseron and then Copaxone (lymphadenopathy?), then switched back to Betaseron for another 10 years  [id number] - tried Tysabri x3, however due to reaction to Tysabri had to go back on Betaseron.  [id number] - had an episode of headache, N/V, difficulty getting the words out and gray curtain over the visual field (binocular), lasted a few hours and resolved with sleep, was told she had ocular migraine.   3/2018 - she can not recall what symptoms she was having around that time but it required 3 days of IVMP  10/2018 - she was switched to Ocrevus " "due to presence of cord lesions. the first infusion was followed by a period of fatigue and fever, but the second infusion was tolerated better. Currently tolerates well.   She was following with Dr. Urban up until recently, she is now transferring care to [facility] since [place] left [place].    I also mentioned that I would like her to be evaluated by neuro ophthalmologist on a yearly basis.  She is overdue for MRIs to assess for any interval change which I ordered today.  She will need to go to the lab for pre infusion workup in early February.  We will reconvene in another 6 months or sooner if needed. "     ?  Current Outpatient Medications   Medication Sig Dispense Refill    atorvastatin (LIPITOR) 40 MG tablet Take 40 mg by mouth once daily.      baclofen (LIORESAL) 5 mg Tab tablet TAKE 1 TABLET (5 MG TOTAL) BY MOUTH NIGHTLY AS NEEDED (STIFFNESS AND MUSCLE CRAMPS). 90 tablet 1    levocetirizine dihydrochloride (LEVOCETIRIZINE ORAL) Take 5 mg by mouth daily as needed for Allergies.      nitrofurantoin, macrocrystal-monohydrate, (MACROBID) 100 MG capsule Take 1 capsule (100 mg total) by mouth 2 (two) times daily. 14 capsule 0    ocrelizumab (OCREVUS) 30 mg/mL Soln Every six months.      SYNTHROID 88 mcg tablet TAKE 1 TABLET BY MOUTH BEFORE BREAKFAST. 90 tablet 3     No current facility-administered medications for this visit.     Review of patient's allergies indicates:   Allergen Reactions    Natalizumab      Past Medical History:   Diagnosis Date    Hypothyroidism surgical for benign nodules    Multiple sclerosis      Past Surgical History:   Procedure Laterality Date     SECTION  10/2013     Family History   Problem Relation Age of Onset    Thyroid disease Neg Hx     Diabetes Neg Hx      Social History     Socioeconomic History    Marital status:    Tobacco Use    Smoking status: Never    Smokeless tobacco: Former   Substance and Sexual Activity    Alcohol use: Not Currently    Drug use: Never "    Sexual activity: Yes     Partners: Male       Examination:  She was well-appearing. She was alert and oriented. Attention span and concentration were normal. Speech, language, memory, and general knowledge were intact.      Her distance visual acuity without correction was 20/20  in the right eye and 20/20  in the left eye. Her near visual acuity with correction was J1 in the right eye and J1 in the left eye.      She perceived 8/8 OD and 8/8 OS Ishihara color plates correctly. Pupils were brisk to light without an afferent defect. Ocular ductions were full. 6 XT in primary, 1 XT in right, and 3 XT in left gaze by cross cover. There was no nystagmus. Saccades and pursuits were normal. No evidence of CHERELLE. Lids were symmetric.     Optic discs appeared normal OD and with mild temporal pallor OS. Pupillary dilation was not necessary for visualization of the optic disc today.     Laboratories Reviewed:     N/a  ?  Neuroimaging Reviewed:     2/1/2023 MRI brain demyelinating protocol    Intracranial Compartment:     Ventricles are stable in size.  No hydrocephalus.     The brain parenchyma appears unchanged.  Multiple scattered T2/FLAIR hyperintense lesions, in a distribution that could be compatible with the reported history of multiple sclerosis.  These appear stable in size, number and distribution from the prior exam allowing for variation in slice selection and technique.  No new discrete lesions.     No new parenchymal mass, hemorrhage or infarction.     No extra-axial blood or fluid collections.     Normal vascular flow voids are preserved.     Skull/Extracranial Contents (limited evaluation):     Bone marrow signal intensity is normal.     Impression:     Brain appears grossly stable from prior exam, allowing for variation in slice selection and technique.  Findings compatible with the reported history of multiple sclerosis, with no new discrete lesions identified to indicate ongoing demyelination.  ?  Ocular  Imaging, Photos, Records Reviewed:     OCT RNFL Today 6/15/2023:   Right Eye - Average RNFL 94 all segments normal   Left Eye - Average RNFL 81 borderline superior and temporal thinning     Normal macular architecture OU    Visual Field Test 24-2 OU Today 6/15/2023: Right Eye - fixation losses 3/12, false positives 0%, false negatives 2%, MD -3.67dB, Impression OD: inferior depression, worse nasally. Left Eye - fixation losses 0/11, false positives 0%, false negatives 0%, MD -3.49dB, Impression OS: inferior depression.  ?  Impression:  Thu Juares has history of HLD, hypothyroidism, MS on Ocrevus, who presents to establish in neuro-ophthalmology clinic. They report remote optic neuritis (relatively asymptomatic and unclear which side) and depth perception difficulty. Neuro-ophthalmologic examination was notable for excellent visual acuities, full color vision, normal ocular motility and alignment. OCT with mild RNFL thinning OS. Likely had mild left eye optic neuritis in the past. Formal visual fields were bilateral mild inferior depression.  ?  Plan:  1. Follow up in MS clinic as planned  2. Follow up with optometry/ophthalmology for yearly routine eye exams and refraction needs    Follow-up:  I will see her in follow-up in 1 year or sooner with any change.  ?OCT and HVF  ?  Visit Checklist (as applicable):  1. Status of new and prior symptoms discussed? yes  2. Neuroimaging reviewed/ ordered as appropriate? yes  3. Ocular imaging and photos reviewed/ ordered as appropriate? yes  4. Plan for work-up and treatment discussed with patient? Yes  5. Potential medication side-effects and monitoring plan discussed? N/a  6. Review of outside medical records was performed and pertinent details are summarized in the HPI above? N/a    Time spent on this encounter: 60 minutes. This includes face to face time and non-face to face time preparing to see the patient (eg, review of tests), obtaining and/or reviewing separately  obtained history, documenting clinical information in the electronic or other health record, independently interpreting results and communicating results to the patient/family/caregiver, or care coordinator.      ODESSA Tobias  Neuro-Ophthalmology Consultant

## 2023-06-15 ENCOUNTER — OFFICE VISIT (OUTPATIENT)
Dept: OPHTHALMOLOGY | Facility: CLINIC | Age: 48
End: 2023-06-15
Payer: COMMERCIAL

## 2023-06-15 ENCOUNTER — CLINICAL SUPPORT (OUTPATIENT)
Dept: OPHTHALMOLOGY | Facility: CLINIC | Age: 48
End: 2023-06-15
Payer: COMMERCIAL

## 2023-06-15 DIAGNOSIS — G35 MULTIPLE SCLEROSIS: ICD-10-CM

## 2023-06-15 DIAGNOSIS — H53.15 VISUAL DISTORTIONS OF SHAPE AND SIZE: ICD-10-CM

## 2023-06-15 DIAGNOSIS — G35 MULTIPLE SCLEROSIS: Primary | ICD-10-CM

## 2023-06-15 PROCEDURE — 1159F MED LIST DOCD IN RCRD: CPT | Mod: CPTII,S$GLB,, | Performed by: STUDENT IN AN ORGANIZED HEALTH CARE EDUCATION/TRAINING PROGRAM

## 2023-06-15 PROCEDURE — 99205 OFFICE O/P NEW HI 60 MIN: CPT | Mod: S$GLB,,, | Performed by: STUDENT IN AN ORGANIZED HEALTH CARE EDUCATION/TRAINING PROGRAM

## 2023-06-15 PROCEDURE — 1160F PR REVIEW ALL MEDS BY PRESCRIBER/CLIN PHARMACIST DOCUMENTED: ICD-10-PCS | Mod: CPTII,S$GLB,, | Performed by: STUDENT IN AN ORGANIZED HEALTH CARE EDUCATION/TRAINING PROGRAM

## 2023-06-15 PROCEDURE — 92133 CPTRZD OPH DX IMG PST SGM ON: CPT | Mod: S$GLB,,, | Performed by: STUDENT IN AN ORGANIZED HEALTH CARE EDUCATION/TRAINING PROGRAM

## 2023-06-15 PROCEDURE — 92133 POSTERIOR SEGMENT OCT OPTIC NERVE(OCULAR COHERENCE TOMOGRAPHY) - OU - BOTH EYES: ICD-10-PCS | Mod: S$GLB,,, | Performed by: STUDENT IN AN ORGANIZED HEALTH CARE EDUCATION/TRAINING PROGRAM

## 2023-06-15 PROCEDURE — 99999 PR PBB SHADOW E&M-EST. PATIENT-LVL III: CPT | Mod: PBBFAC,,, | Performed by: STUDENT IN AN ORGANIZED HEALTH CARE EDUCATION/TRAINING PROGRAM

## 2023-06-15 PROCEDURE — 92083 HUMPHREY VISUAL FIELD - OU - BOTH EYES: ICD-10-PCS | Mod: S$GLB,,, | Performed by: STUDENT IN AN ORGANIZED HEALTH CARE EDUCATION/TRAINING PROGRAM

## 2023-06-15 PROCEDURE — 1159F PR MEDICATION LIST DOCUMENTED IN MEDICAL RECORD: ICD-10-PCS | Mod: CPTII,S$GLB,, | Performed by: STUDENT IN AN ORGANIZED HEALTH CARE EDUCATION/TRAINING PROGRAM

## 2023-06-15 PROCEDURE — 99999 PR PBB SHADOW E&M-EST. PATIENT-LVL III: ICD-10-PCS | Mod: PBBFAC,,, | Performed by: STUDENT IN AN ORGANIZED HEALTH CARE EDUCATION/TRAINING PROGRAM

## 2023-06-15 PROCEDURE — 1160F RVW MEDS BY RX/DR IN RCRD: CPT | Mod: CPTII,S$GLB,, | Performed by: STUDENT IN AN ORGANIZED HEALTH CARE EDUCATION/TRAINING PROGRAM

## 2023-06-15 PROCEDURE — 99205 PR OFFICE/OUTPT VISIT, NEW, LEVL V, 60-74 MIN: ICD-10-PCS | Mod: S$GLB,,, | Performed by: STUDENT IN AN ORGANIZED HEALTH CARE EDUCATION/TRAINING PROGRAM

## 2023-06-15 PROCEDURE — 92083 EXTENDED VISUAL FIELD XM: CPT | Mod: S$GLB,,, | Performed by: STUDENT IN AN ORGANIZED HEALTH CARE EDUCATION/TRAINING PROGRAM

## 2023-06-15 NOTE — PROGRESS NOTES
At [facility], we strive to deliver an exceptional experience to you, every time we see you.  If you receive a survey in the mail, please send us back your thoughts. We really do value your feedback.    Based on your medical history, these are the current health maintenance/preventive care services that you are due for (some may have been done at this visit.)  Health Maintenance Due   Topic Date Due     PREVENTIVE CARE VISIT  1969     PHQ-2 Q1 YR  07/11/1981     HIV SCREEN (SYSTEM ASSIGNED)  07/11/1987     PAP SCREENING Q3 YR (SYSTEM ASSIGNED)  07/11/1990     DTAP/TDAP/TD IMMUNIZATION (1 - Tdap) 07/11/1994     INFLUENZA VACCINE (1) 09/01/2018         Suggested websites for health information:  Www."[employer] : Up to date and easily searchable information on multiple topics.  Www.Smartsheet.gov : medication info, interactive tutorials, watch real surgeries online  Www.familydoctor.org : good info from the Academy of Family Physicians  Www.cdc.gov : public health info, travel advisories, epidemics (H1N1)  Www.aap.org : children's health info, normal development, vaccinations  Www.health.[facility].[place].us : [place] dept of health, public health issues in [place], N1N1    Your care team:                            Family Medicine Internal Medicine   MD Sky Chahal MD Shantel Branch-Fleming, MD Katya Georgiev PA-C Nam Ho, MD Pediatrics   NOEMÍ Dahl, MD Miriam Vu CNP, MD Deborah Mielke, MD Kim Thein, APRN CNP      Clinic hours: Monday - Thursday 7 am-7 pm; Fridays 7 am-5 pm.   Urgent care: Monday - Friday 11 am-9 pm; Saturday and Sunday 9 am-5 pm.  Pharmacy : Monday -Thursday 8 am-8 pm; Friday 8 am-6 pm; Saturday and Sunday 9 am-5 pm.     Clinic: [phone]   Pharmacy: [phone]    Preventive Health Recommendations  Female Ages 40 to 49    Yearly exam:     See your health care provider every year  Visual field test done.  Patient stated no latex allergies used coverlet         in order to  1. Review health changes.   2. Discuss preventive care.    3. Review your medicines if your doctor prescribed any.      Get a Pap test every three years (unless you have an abnormal result and your provider advises testing more often).      If you get Pap tests with HPV test, you only need to test every 5 years, unless you have an abnormal result. You do not need a Pap test if your uterus was removed (hysterectomy) and you have not had cancer.      You should be tested each year for STDs (sexually transmitted diseases), if you're at risk.     Ask your doctor if you should have a mammogram.      Have a colonoscopy (test for colon cancer) if someone in your family has had colon cancer or polyps before age 50.       Have a cholesterol test every 5 years.       Have a diabetes test (fasting glucose) after age 45. If you are at risk for diabetes, you should have this test every 3 years.    Shots: Get a flu shot each year. Get a tetanus shot every 10 years.     Nutrition:     Eat at least 5 servings of fruits and vegetables each day.    Eat whole-grain bread, whole-wheat pasta and brown rice instead of white grains and rice.    Get adequate Calcium and Vitamin D.      Lifestyle    Exercise at least 150 minutes a week (an average of 30 minutes a day, 5 days a week). This will help you control your weight and prevent disease.    Limit alcohol to one drink per day.    No smoking.     Wear sunscreen to prevent skin cancer.    See your dentist every six months for an exam and cleaning.

## 2023-06-15 NOTE — LETTER
[facility] [address]  [address]  [facility] [address]  Phone: [phone]  Fax: [phone]   Alexa 15, 2023    Cintia Kitchen MD  [address]  [address]  [facility] [address]    Patient: Thu Juares   MR Number: [id number]   YOB: 1975   Date of Visit: 6/15/2023       Dear Dr. Kitchen :    Thank you for referring Thu Juares to me for evaluation. Here is my assessment and plan of care:    Impression:  Thu Juares has history of HLD, hypothyroidism, MS on Ocrevus, who presents to establish in neuro-ophthalmology clinic. They report remote optic neuritis (relatively asymptomatic and unclear which side) and depth perception difficulty. Neuro-ophthalmologic examination was notable for excellent visual acuities, full color vision, normal ocular motility and alignment. OCT with mild RNFL thinning OS. Likely had mild left eye optic neuritis in the past. Formal visual fields were bilateral mild inferior depression.     Plan:  1. Follow up in MS clinic as planned  2. Follow up with optometry/ophthalmology for yearly routine eye exams and refraction needs    Follow-up:  I will see her in follow-up in 1 year or sooner with any change.    If you have questions, please do not hesitate to call me. I look forward to following Ms. Thu Juares along with you.    Sincerely,        Lilo Cota MD       CC  No Recipients

## 2023-06-19 ENCOUNTER — CLINICAL SUPPORT (OUTPATIENT)
Dept: REHABILITATION | Facility: HOSPITAL | Age: 48
End: 2023-06-19
Payer: COMMERCIAL

## 2023-06-19 DIAGNOSIS — R53.1 WEAKNESS: ICD-10-CM

## 2023-06-19 DIAGNOSIS — N39.46 MIXED INCONTINENCE URGE AND STRESS: ICD-10-CM

## 2023-06-19 DIAGNOSIS — N81.89 PELVIC FLOOR WEAKNESS: Primary | ICD-10-CM

## 2023-06-19 PROCEDURE — 97530 THERAPEUTIC ACTIVITIES: CPT | Mod: PN | Performed by: PHYSICAL THERAPIST

## 2023-06-19 PROCEDURE — 97112 NEUROMUSCULAR REEDUCATION: CPT | Mod: PN | Performed by: PHYSICAL THERAPIST

## 2023-06-19 NOTE — PROGRESS NOTES
Pelvic Health Physical Therapy   Treatment Note and Progress note     Name: Thu Juares  Clinic Number: [id number]    Therapy Diagnosis:   Encounter Diagnoses   Name Primary?    Pelvic floor weakness Yes    Mixed incontinence urge and stress     Weakness        Physician: Cinthya Kumari NP    Visit Date: 6/19/2023    Physician Orders: PT Eval and Treat PF PT  Medical Diagnosis from Referral:   N39.46 (ICD-10-CM) - Mixed incontinence urge and stress   N81.89 (ICD-10-CM) - Pelvic floor weakness      Evaluation Date: 5/5/2023  Authorization Period Expiration: 4/10/24  Plan of Care Expiration: 7/28/2023  Progress Note Due: 7/17/2023  Visit # 6/12 Visits authorized: 5/24   FOTO: 1/3  Cancelled Visits: 0  No Show Visits: 0    Time In: 8:47 AM  Time Out: 9:28 AM   Total Billable Time: 41 minutes    Precautions: Standard    Subjective     Pt reports:  Using Macrobid due to UTI. Has not moved bowels all weekend. Did move this morning. Bowel movement was easier.     She was compliant with home exercise program.  Response to previous treatment: none  Functional change: none    Pain in:  0/10 mild back pain  Pain out: 0/10  Location: none      Objective   Thu participated in dynamic functional therapeutic activities to improve functional performance for 11  minutes, including:  Encouraged improved bowel habits with use of squatty potty. Being mindful of bowel and bladder habits with a change in routine.     Thu participated in neuromuscular re-education activities to develop Coordination and Proprioception for 30 minutes including: rehabilitative ultrasound imaging to help visualize pelvic floor muscle contraction and relaxation with kegels  Core and PF awareness not to generate excessive abdominal pressure.         Intervention Eval  05/18/2023 05/22/2023 06/01/2023 06/05/2023 06/12/2023 06/19/2023               Neuro Re-Ed Rehabilitative ultrasound   PF down training  PF coordination pelvic floor downtraining  with assist of RUSI  and rehabilitative ultrasound imaging to help visualize pelvic floor muscle contraction and relaxation with kegels. Improved pelvic floor mobility. Still with limited PF activation and awareness. Used rehabilitative ultrasound to assist with TA activation. Was able to perform with decreased intra-abdominal pressure with practice.    rehabilitative ultrasound imaging to help visualize pelvic floor muscle contraction and relaxation with kegels  Core and PF awareness not to generate excessive abdominal pressure.     Manual Ther Vaginal exam   PF tension        TherAct Gastro Colic Relfex     x Physical therapist performed abdominal massage and educated on bowel habits with traveling.       Neuro Re-ed Core coordination     core and pelvic floor strength and coordination training. Provided with home exercise program. Educated on core awareness with functional mobility. Provided tactile cues for core awareness. Educated on quick core activation with throat clearing.      TherAct Education      Encouraged improved bowel habits. States that her stool is soft, just hard to pass. Educated on water intake, abdominal massage, use of squatty potty. Educated pt on the fact that her impaired sensation could be making bowel and bladder evacuation a challenge.    Encouraged improved bowel habits with use of squatty potty. Being mindful of bowel and bladder habits with a change in routine.        Home Exercises Provided and Patient Education Provided     Education provided:   - anatomy/physiology of pelvic floor and posture/body mechanices  Discussed progression of plan of care with patient; educated pt in activity modification; reviewed HEP with pt. Pt demonstrated and verbalized understanding of all instruction and was provided with a handout of HEP (see Patient Instructions).    Written Home Exercises Provided: yes.  Exercises were reviewed and Thu was able to demonstrate them prior to the end of the  session.  Thu demonstrated good  understanding of the education provided.     See EMR under Patient Instructions for exercises provided 06/19/2023 .    Assessment     Encouraged pelvic floor mobility to assist with bowel evacuation. Improved core awareness without excessive abdominal pressure.     Thu Is progressing well towards her goals.   Pt prognosis is Excellent.     Pt will continue to benefit from skilled outpatient physical therapy to address the deficits listed in the problem list box on initial evaluation, provide pt/family education and to maximize pt's level of independence in the home and community environment.     Pt's spiritual, cultural and educational needs considered and pt agreeable to plan of care and goals.     Anticipated barriers to physical therapy: none     Short Term Goals: 6 weeks   Patient will deny urgency of urine. Goal not met - progressing  Patient will be independent with pelvic floor relaxation to improve bowel and bladder evacuation. Goal met - 06/19/2023   Patient will be able to demonstrate improved pelvic floor relaxation and contraction on rehabilitative ultrasound. Goal not met - progressing  Patient will report feeling the urge to urinate prior to it being too late. Goal not met - progressing  Patient will report improved water intake. Goal met - 06/19/2023      Long Term Goals: 12 weeks   Patient will report urinating every 3-4 hours with strong urine stream. Goal not met - progressing  Patient will deny nocturia. Goal not met - progressing  Patient will demonstrate adequate pelvic floor coordination with contraction and relaxation on sEMG vs rehabilitative ultrasound. Goal not met - progressing  Patient will deny constipation and straining for bowel movements. Goal not met - progressing    Plan     Continue with progressive pelvic floor coordination training. Will plan for PF biofeedback with e-stim next visit with mindfulness of limited pelvic floor sensation.      Shmuel Saul, PT

## 2023-06-26 ENCOUNTER — CLINICAL SUPPORT (OUTPATIENT)
Dept: REHABILITATION | Facility: HOSPITAL | Age: 48
End: 2023-06-26
Attending: NURSE PRACTITIONER
Payer: COMMERCIAL

## 2023-06-26 DIAGNOSIS — R53.1 WEAKNESS: ICD-10-CM

## 2023-06-26 DIAGNOSIS — R27.8 COORDINATION ABNORMAL: ICD-10-CM

## 2023-06-26 DIAGNOSIS — N39.46 MIXED INCONTINENCE URGE AND STRESS: ICD-10-CM

## 2023-06-26 DIAGNOSIS — N81.89 PELVIC FLOOR WEAKNESS: Primary | ICD-10-CM

## 2023-06-26 PROCEDURE — 97530 THERAPEUTIC ACTIVITIES: CPT | Mod: PN | Performed by: PHYSICAL THERAPIST

## 2023-06-26 PROCEDURE — 97112 NEUROMUSCULAR REEDUCATION: CPT | Mod: PN | Performed by: PHYSICAL THERAPIST

## 2023-06-26 NOTE — PATIENT INSTRUCTIONS
YouTube: mindfulness, meditation, chair Yoga, body scan   Find your OFF - what helps you relax  May hear digestive noises when you do this   Goal: practice when you are not so anxious so you have a tool to use when you are anxious

## 2023-06-26 NOTE — PROGRESS NOTES
Pelvic Health Physical Therapy   Treatment Note      Name: Thu Juares  Clinic Number: [id number]    Therapy Diagnosis:   Encounter Diagnoses   Name Primary?    Pelvic floor weakness Yes    Mixed incontinence urge and stress     Coordination abnormal     Weakness        Physician: Cinthya Kumari NP    Visit Date: 6/26/2023    Physician Orders: PT Eval and Treat PF PT  Medical Diagnosis from Referral:   N39.46 (ICD-10-CM) - Mixed incontinence urge and stress   N81.89 (ICD-10-CM) - Pelvic floor weakness      Evaluation Date: 5/5/2023  Authorization Period Expiration: 4/10/24  Plan of Care Expiration: 7/28/2023  Progress Note Due: 7/17/2023  Visit # 7/12 Visits authorized: 7/24   FOTO: 2/3  Cancelled Visits: 0  No Show Visits: 0    Time In: 9:00 AM   Time Out: 9:47 AM   Total Billable Time: 47 minutes    Precautions: Standard    Subjective     Pt reports:  patient states that she will leak with urgency. States that her back is still hurting. I have recommended she follow up with MD. Bowels are not moving well. She did move her bowels yesterday, but does not feel empty. Did not move this morning. She completed her antibiotic. Did not do well with water intake this weekend. Felt good with core - did use the core with her daily routine.     She was compliant with home exercise program.  Response to previous treatment: none  Functional change: none    Pain in:  0/10 mild back pain  Pain out: 0/10  Location: none      Objective   Thu participated in dynamic functional therapeutic activities to improve functional performance for 30  minutes, including:  Educated pt on contacting MD regarding her back pain that has persisted since she completed her antibiotic. Advised pt to spend some time on anxiety management and breathing. Advised on mindfulness, meditation, and body scan. Advised pt to work more on down training than core strength training this next week.     Thu participated in neuromuscular re-education  activities to develop Coordination and Proprioception for 17 minutes including: rehabilitative ultrasound imaging to help visualize pelvic floor muscle contraction and relaxation with kegels and to assist with management of intra-abdominal pressure. Patient with difficulty with core awareness and further difficulty with PF awareness.   Had pt place their hand on the chest and abdomen. Had difficulty determining intra-abdominal pressure. Attempted this with clearing her throat. Was generating a lot of abdominal pressure.        Intervention Eval  06/01/2023 06/05/2023 06/12/2023 06/19/2023 06/26/2023              Neuro Re-Ed Rehabilitative ultrasound   pelvic floor downtraining with assist of RUSI  and rehabilitative ultrasound imaging to help visualize pelvic floor muscle contraction and relaxation with kegels. Improved pelvic floor mobility. Still with limited PF activation and awareness. Used rehabilitative ultrasound to assist with TA activation. Was able to perform with decreased intra-abdominal pressure with practice.    rehabilitative ultrasound imaging to help visualize pelvic floor muscle contraction and relaxation with kegels  Core and PF awareness not to generate excessive abdominal pressure.   Rehabilitative ultrasound   Bladder volume = 20mL  Core and abdominal management   Manual Ther Vaginal exam          TherAct Gastro Colic Relfex   Physical therapist performed abdominal massage and educated on bowel habits with traveling.        Neuro Re-ed Core coordination   core and pelvic floor strength and coordination training. Provided with home exercise program. Educated on core awareness with functional mobility. Provided tactile cues for core awareness. Educated on quick core activation with throat clearing.       TherAct Education    Encouraged improved bowel habits. States that her stool is soft, just hard to pass. Educated on water intake, abdominal massage, use of squatty potty. Educated pt on the  fact that her impaired sensation could be making bowel and bladder evacuation a challenge.    Encouraged improved bowel habits with use of squatty potty. Being mindful of bowel and bladder habits with a change in routine.  Down training, breathing, anxiety management       Home Exercises Provided and Patient Education Provided     Education provided:   - anatomy/physiology of pelvic floor and posture/body mechanices  Discussed progression of plan of care with patient; educated pt in activity modification; reviewed HEP with pt. Pt demonstrated and verbalized understanding of all instruction and was provided with a handout of HEP (see Patient Instructions).    Written Home Exercises Provided: yes.  Exercises were reviewed and Thu was able to demonstrate them prior to the end of the session.  Thu demonstrated good  understanding of the education provided.     See EMR under Patient Instructions for exercises provided 06/26/2023 .    Assessment     Encouraged regular down training to assist with anxiety. Has a lot of difficulty with management of intra-abdominal pressure; however, incontinence seems to be more urge related currently not stress urinary incontinence.     Thu Is progressing well towards her goals.   Pt prognosis is Excellent.     Pt will continue to benefit from skilled outpatient physical therapy to address the deficits listed in the problem list box on initial evaluation, provide pt/family education and to maximize pt's level of independence in the home and community environment.     Pt's spiritual, cultural and educational needs considered and pt agreeable to plan of care and goals.     Anticipated barriers to physical therapy: none     Short Term Goals: 6 weeks   Patient will deny urgency of urine. Goal not met - progressing  Patient will be independent with pelvic floor relaxation to improve bowel and bladder evacuation. Goal met - 06/19/2023   Patient will be able to demonstrate improved pelvic  floor relaxation and contraction on rehabilitative ultrasound. Goal not met - progressing  Patient will report feeling the urge to urinate prior to it being too late. Goal not met - progressing  Patient will report improved water intake. Goal met - 06/19/2023      Long Term Goals: 12 weeks   Patient will report urinating every 3-4 hours with strong urine stream. Goal not met - progressing  Patient will deny nocturia. Goal not met - progressing  Patient will demonstrate adequate pelvic floor coordination with contraction and relaxation on sEMG vs rehabilitative ultrasound. Goal not met - progressing  Patient will deny constipation and straining for bowel movements. Goal not met - progressing    Plan     Continue with progressive pelvic floor coordination training. I have held off on pelvic floor e-stim due to UTI and back pain.   Shmuel Saul, PT

## 2023-07-12 ENCOUNTER — CLINICAL SUPPORT (OUTPATIENT)
Dept: REHABILITATION | Facility: HOSPITAL | Age: 48
End: 2023-07-12
Attending: NURSE PRACTITIONER
Payer: COMMERCIAL

## 2023-07-12 DIAGNOSIS — N39.46 MIXED INCONTINENCE URGE AND STRESS: ICD-10-CM

## 2023-07-12 DIAGNOSIS — R27.8 COORDINATION ABNORMAL: ICD-10-CM

## 2023-07-12 DIAGNOSIS — N81.89 PELVIC FLOOR WEAKNESS: Primary | ICD-10-CM

## 2023-07-12 PROCEDURE — 97140 MANUAL THERAPY 1/> REGIONS: CPT | Mod: PN | Performed by: PHYSICAL THERAPIST

## 2023-07-12 PROCEDURE — 97530 THERAPEUTIC ACTIVITIES: CPT | Mod: PN | Performed by: PHYSICAL THERAPIST

## 2023-07-12 NOTE — PROGRESS NOTES
"  Pelvic Health Physical Therapy   Treatment Note      Name: Thu PEREZ Luke  Clinic Number: [id number]    Therapy Diagnosis:   Encounter Diagnoses   Name Primary?    Pelvic floor weakness Yes    Mixed incontinence urge and stress     Coordination abnormal        Physician: Cinthya Kumari NP    Visit Date: 7/12/2023    Physician Orders: PT Eval and Treat PF PT  Medical Diagnosis from Referral:   N39.46 (ICD-10-CM) - Mixed incontinence urge and stress   N81.89 (ICD-10-CM) - Pelvic floor weakness      Evaluation Date: 5/5/2023  Authorization Period Expiration: 4/10/24  Plan of Care Expiration: 7/28/2023  Progress Note Due: 7/17/2023  Visit # 8/12 Visits authorized: 8/24   FOTO: 2/3  Cancelled Visits: 0  No Show Visits: 0    Time In: 1:02 PM   Time Out:1:52 PM   Total Billable Time: 50 minutes    Precautions: Standard    Subjective     Pt reports:  Complaints of hip stiffness today. She is not sure if it is from her cholesterol medication. States that the past two Sunday's she emptied out with diarrhea. Has stopped the Fiber with diarrhea. I did recommend a probiotic. Limited urge to urinate.     She was compliant with home exercise program.  Response to previous treatment: none  Functional change: none    Pain in:  8/10 hip pain, "My back is good"  Pain out: 0/10  Location: none      Objective   Thu received the following manual therapy techniques: to develop flexibility for 30 minutes including: passive stretching of bilateral hip flexors, bilateral gastrocs, single knee to chest, piriformis stretch, hip rotation supine.       Thu participated in dynamic functional therapeutic activities to improve functional performance for 20  minutes, including:  Educated on further bladder testing to determine the function of her bladder and pelvic floor.          Intervention Eval  06/01/2023 06/05/2023 06/12/2023 06/19/2023 06/26/2023 07/12/2023               Neuro Re-Ed Rehabilitative ultrasound   pelvic floor " downtraining with assist of RUSI  and rehabilitative ultrasound imaging to help visualize pelvic floor muscle contraction and relaxation with kegels. Improved pelvic floor mobility. Still with limited PF activation and awareness. Used rehabilitative ultrasound to assist with TA activation. Was able to perform with decreased intra-abdominal pressure with practice.    rehabilitative ultrasound imaging to help visualize pelvic floor muscle contraction and relaxation with kegels  Core and PF awareness not to generate excessive abdominal pressure.   Rehabilitative ultrasound   Bladder volume = 20mL  Core and abdominal management    Manual Ther Vaginal exam           TherAct Gastro Colic Relfex   Physical therapist performed abdominal massage and educated on bowel habits with traveling.         Neuro Re-ed Core coordination   core and pelvic floor strength and coordination training. Provided with home exercise program. Educated on core awareness with functional mobility. Provided tactile cues for core awareness. Educated on quick core activation with throat clearing.        TherAct Education    Encouraged improved bowel habits. States that her stool is soft, just hard to pass. Educated on water intake, abdominal massage, use of squatty potty. Educated pt on the fact that her impaired sensation could be making bowel and bladder evacuation a challenge.    Encouraged improved bowel habits with use of squatty potty. Being mindful of bowel and bladder habits with a change in routine.  Down training, breathing, anxiety management        Home Exercises Provided and Patient Education Provided     Education provided:   - anatomy/physiology of pelvic floor and posture/body mechanices  Discussed progression of plan of care with patient; educated pt in activity modification; reviewed HEP with pt. Pt demonstrated and verbalized understanding of all instruction and was provided with a handout of HEP (see Patient  Instructions).    Written Home Exercises Provided: yes.  Exercises were reviewed and Thu was able to demonstrate them prior to the end of the session.  Thu demonstrated good  understanding of the education provided.     See EMR under Patient Instructions for exercises provided 07/12/2023 .    Assessment     Encouraged regular down training to assist with anxiety. Has a lot of difficulty with management of intra-abdominal pressure; however, incontinence seems to be more urge related currently not stress urinary incontinence.     Thu Is progressing well towards her goals.   Pt prognosis is Excellent.     Pt will continue to benefit from skilled outpatient physical therapy to address the deficits listed in the problem list box on initial evaluation, provide pt/family education and to maximize pt's level of independence in the home and community environment.     Pt's spiritual, cultural and educational needs considered and pt agreeable to plan of care and goals.     Anticipated barriers to physical therapy: none     Short Term Goals: 6 weeks   Patient will deny urgency of urine. Goal not met - progressing  Patient will be independent with pelvic floor relaxation to improve bowel and bladder evacuation. Goal met - 06/19/2023   Patient will be able to demonstrate improved pelvic floor relaxation and contraction on rehabilitative ultrasound. Goal not met - progressing  Patient will report feeling the urge to urinate prior to it being too late. Goal not met - progressing  Patient will report improved water intake. Goal met - 06/19/2023      Long Term Goals: 12 weeks   Patient will report urinating every 3-4 hours with strong urine stream. Goal not met - progressing  Patient will deny nocturia. Goal not met - progressing  Patient will demonstrate adequate pelvic floor coordination with contraction and relaxation on sEMG vs rehabilitative ultrasound. Goal not met - progressing  Patient will deny constipation and  straining for bowel movements. Goal not met - progressing    Plan     Continue with progressive pelvic floor coordination training. I have held off on pelvic floor e-stim due to UTI and back pain. Message Kenyetta about UDS.     Shmuel Saul, PT

## 2023-07-19 ENCOUNTER — CLINICAL SUPPORT (OUTPATIENT)
Dept: REHABILITATION | Facility: HOSPITAL | Age: 48
End: 2023-07-19
Attending: NURSE PRACTITIONER
Payer: COMMERCIAL

## 2023-07-19 ENCOUNTER — OFFICE VISIT (OUTPATIENT)
Dept: UROGYNECOLOGY | Facility: CLINIC | Age: 48
End: 2023-07-19
Payer: COMMERCIAL

## 2023-07-19 DIAGNOSIS — R53.1 WEAKNESS: ICD-10-CM

## 2023-07-19 DIAGNOSIS — N81.89 PELVIC FLOOR WEAKNESS: Primary | ICD-10-CM

## 2023-07-19 DIAGNOSIS — N39.41 URGE INCONTINENCE: Primary | ICD-10-CM

## 2023-07-19 DIAGNOSIS — N39.46 MIXED INCONTINENCE URGE AND STRESS: ICD-10-CM

## 2023-07-19 DIAGNOSIS — R27.8 COORDINATION ABNORMAL: ICD-10-CM

## 2023-07-19 PROCEDURE — 97112 NEUROMUSCULAR REEDUCATION: CPT | Mod: PN | Performed by: PHYSICAL THERAPIST

## 2023-07-19 PROCEDURE — 97530 THERAPEUTIC ACTIVITIES: CPT | Mod: PN | Performed by: PHYSICAL THERAPIST

## 2023-07-19 PROCEDURE — 99213 OFFICE O/P EST LOW 20 MIN: CPT | Mod: 95,,, | Performed by: NURSE PRACTITIONER

## 2023-07-19 PROCEDURE — 1160F RVW MEDS BY RX/DR IN RCRD: CPT | Mod: CPTII,95,, | Performed by: NURSE PRACTITIONER

## 2023-07-19 PROCEDURE — 1159F PR MEDICATION LIST DOCUMENTED IN MEDICAL RECORD: ICD-10-PCS | Mod: CPTII,95,, | Performed by: NURSE PRACTITIONER

## 2023-07-19 PROCEDURE — 99213 PR OFFICE/OUTPT VISIT, EST, LEVL III, 20-29 MIN: ICD-10-PCS | Mod: 95,,, | Performed by: NURSE PRACTITIONER

## 2023-07-19 PROCEDURE — 1159F MED LIST DOCD IN RCRD: CPT | Mod: CPTII,95,, | Performed by: NURSE PRACTITIONER

## 2023-07-19 PROCEDURE — 1160F PR REVIEW ALL MEDS BY PRESCRIBER/CLIN PHARMACIST DOCUMENTED: ICD-10-PCS | Mod: CPTII,95,, | Performed by: NURSE PRACTITIONER

## 2023-07-19 RX ORDER — VIBEGRON 75 MG/1
75 TABLET, FILM COATED ORAL DAILY
Qty: 30 TABLET | Refills: 11 | Status: SHIPPED | OUTPATIENT
Start: 2023-07-19

## 2023-07-19 NOTE — PROGRESS NOTES
Pelvic Health Physical Therapy   Treatment Note      Name: Thu PEREZ Lukianna  Clinic Number: [id number]    Therapy Diagnosis:   Encounter Diagnoses   Name Primary?    Pelvic floor weakness Yes    Mixed incontinence urge and stress     Coordination abnormal     Weakness        Physician: Cinthya Kumari NP    Visit Date: 7/19/2023    Physician Orders: PT Eval and Treat PF PT  Medical Diagnosis from Referral:   N39.46 (ICD-10-CM) - Mixed incontinence urge and stress   N81.89 (ICD-10-CM) - Pelvic floor weakness      Evaluation Date: 5/5/2023  Authorization Period Expiration: 4/10/24  Plan of Care Expiration: 7/28/2023  Progress Note Due: 7/17/2023  Visit # 9/12 Visits authorized: 9/24   FOTO: 2/3  Cancelled Visits: 0  No Show Visits: 0    Time In: 9:49 AM   Time Out: 10:30 AM   Total Billable Time: 41 minutes    Precautions: Standard    Subjective     Pt reports:  Complaints of right sided back pain today.  Was able to hold back urine this week when going to the bathroom, but took a lot of effort. Almost fell due to left hip weakness when standing.     She was compliant with home exercise program.  Response to previous treatment: none  Functional change: none    Pain in:  0/10 left hip pain  Pain out: 0/10  Location: none      Objective   Thu participated in neuromuscular re-education activities to develop Coordination, Control, and Proprioception for 20 minutes including: RUSI for breathing, drops, contractions of the PFM to help pt visualize PFM motion and function. Educated on and practiced core and PF activation without increased intra-abdominal pressure.     Thu participated in dynamic functional therapeutic activities to improve functional performance for 21  minutes, including:  Educated on further bladder testing to determine the function of her bladder and pelvic floor.  I sent a message do Cinthya. Discussed energy conservation with use of a loft strand crutch with weakness/fatigue. May also assist with  hip pain.         Intervention Eval  06/01/2023 06/05/2023 06/12/2023 06/19/2023 06/26/2023 07/12/2023               Neuro Re-Ed Rehabilitative ultrasound   pelvic floor downtraining with assist of RUSI  and rehabilitative ultrasound imaging to help visualize pelvic floor muscle contraction and relaxation with kegels. Improved pelvic floor mobility. Still with limited PF activation and awareness. Used rehabilitative ultrasound to assist with TA activation. Was able to perform with decreased intra-abdominal pressure with practice.    rehabilitative ultrasound imaging to help visualize pelvic floor muscle contraction and relaxation with kegels  Core and PF awareness not to generate excessive abdominal pressure.   Rehabilitative ultrasound   Bladder volume = 20mL  Core and abdominal management RUSI for breathing, drops, contractions of the PFM to help pt visualize PFM motion and function. Educated on and practiced core and PF activation without increased intra-abdominal pressure.   24mL of urine in the bladder - had urinated over an hour ago. Had not had much to drink.   Manual Ther Vaginal exam           TherAct Gastro Colic Relfex   Physical therapist performed abdominal massage and educated on bowel habits with traveling.         Neuro Re-ed Core coordination   core and pelvic floor strength and coordination training. Provided with home exercise program. Educated on core awareness with functional mobility. Provided tactile cues for core awareness. Educated on quick core activation with throat clearing.        TherAct Education    Encouraged improved bowel habits. States that her stool is soft, just hard to pass. Educated on water intake, abdominal massage, use of squatty potty. Educated pt on the fact that her impaired sensation could be making bowel and bladder evacuation a challenge.    Encouraged improved bowel habits with use of squatty potty. Being mindful of bowel and bladder habits with a change in  routine.  Down training, breathing, anxiety management Educated on further bladder testing to determine the function of her bladder and pelvic floor.  I sent a message do Cinthya. Discussed energy conservation with use of a loft strand crutch with weakness/fatigue. May also assist with hip pain.            Home Exercises Provided and Patient Education Provided     Education provided:   - anatomy/physiology of pelvic floor and posture/body mechanices  Discussed progression of plan of care with patient; educated pt in activity modification; reviewed HEP with pt. Pt demonstrated and verbalized understanding of all instruction and was provided with a handout of HEP (see Patient Instructions).    Written Home Exercises Provided: yes.  Exercises were reviewed and Thu was able to demonstrate them prior to the end of the session.  Thu demonstrated good  understanding of the education provided.     See EMR under Patient Instructions for exercises provided 07/19/2023 .    Assessment     Management if intra-abdominal pressure is improved today. She does complaints of memory impairments which limits her carryover with physical therapist recommendations. I have not been able to assess a PVR due to pt not having a lot of urine in the bladder.     Thu Is progressing well towards her goals.   Pt prognosis is Excellent.     Pt will continue to benefit from skilled outpatient physical therapy to address the deficits listed in the problem list box on initial evaluation, provide pt/family education and to maximize pt's level of independence in the home and community environment.     Pt's spiritual, cultural and educational needs considered and pt agreeable to plan of care and goals.     Anticipated barriers to physical therapy: none     Short Term Goals: 6 weeks   Patient will deny urgency of urine. Goal not met - progressing  Patient will be independent with pelvic floor relaxation to improve bowel and bladder evacuation. Goal  met - 06/19/2023   Patient will be able to demonstrate improved pelvic floor relaxation and contraction on rehabilitative ultrasound. Goal not met - progressing  Patient will report feeling the urge to urinate prior to it being too late. Goal not met - progressing  Patient will report improved water intake. Goal met - 06/19/2023      Long Term Goals: 12 weeks   Patient will report urinating every 3-4 hours with strong urine stream. Goal not met - progressing  Patient will deny nocturia. Goal not met - progressing  Patient will demonstrate adequate pelvic floor coordination with contraction and relaxation on sEMG vs rehabilitative ultrasound. Goal not met - progressing  Patient will deny constipation and straining for bowel movements. Goal not met - progressing    Plan     Continue with progressive pelvic floor and core coordination training.     Shmuel Saul, PT

## 2023-07-19 NOTE — PROGRESS NOTES
Urogyn follow up  07/19/2023  .  [facility] [place]  [address]  [facility] [address]    Thu Juares  [id number]  1975      Thu Juares is a 48 y.o. here for a urogyn follow up for urinary incontinence    The patient location is: [place]  The chief complaint leading to consultation is: mixed urinary incontinence    Visit type: audiovisual        Each patient to whom he or she provides medical services by telemedicine is:  (1) informed of the relationship between the physician and patient and the respective role of any other health care provider with respect to management of the patient; and (2) notified that he or she may decline to receive medical services by telemedicine and may withdraw from such care at any time.    Notes:      Last HPI from 03/22/2023     1)  UI:  (--) TERRI < (+) UUI  X 3years. Occasionally does not make it to the restroom if holding longer than 3-4 hours-- can not stop the flow of urine (+) pads:mostly for protection.  Daytime frequency: Q 3 hours.  Nocturia: Yes: 1/night.   (--) dysuria,  (--) hematuria,  (--) frequent UTIs.  (+) complete bladder emptying.      2)  POP:  Absent. Symptoms:(--)  .  (--) vaginal bleeding. (--) vaginal discharge. (+) sexually active.  (--) dyspareunia.   (--)  Vaginal dryness.  (--) vaginal estrogen use.  Anorgasma--present for the past year     3)  BM:  (+) constipation/straining.  (--) chronic diarrhea. (--) hematochezia.  (--) fecal incontinence.  (+) fecal smearing/urgency.  (--) complete evacuation.       4)MS  --diagnosed in 2004  --typical flare starts with vertigo. No sleeping can cause exacerbaton as well as word finding  --taking ocrevis--last dose in 02/2023    Changes from last visit:     1)  Mixed urinary incontinence, urge > stress:    --has been going to pelvic floor PT  --still has urinary urgency  --voiding every 2-3 hours while awake     2)Constipation:  --improved with  fiber supplement    3)complains of RL back pain          Past Medical History:   Diagnosis Date    Hypothyroidism surgical for benign nodules    Multiple sclerosis        Past Surgical History:   Procedure Laterality Date     SECTION  10/2013       Family History   Problem Relation Age of Onset    Thyroid disease Neg Hx     Diabetes Neg Hx        Social History     Socioeconomic History    Marital status:    Tobacco Use    Smoking status: Never    Smokeless tobacco: Former   Substance and Sexual Activity    Alcohol use: Not Currently    Drug use: Never    Sexual activity: Yes     Partners: Male       Current Outpatient Medications   Medication Sig Dispense Refill    atorvastatin (LIPITOR) 40 MG tablet Take 40 mg by mouth once daily.      baclofen (LIORESAL) 5 mg Tab tablet TAKE 1 TABLET (5 MG TOTAL) BY MOUTH NIGHTLY AS NEEDED (STIFFNESS AND MUSCLE CRAMPS). 90 tablet 1    levocetirizine dihydrochloride (LEVOCETIRIZINE ORAL) Take 5 mg by mouth daily as needed for Allergies.      nitrofurantoin, macrocrystal-monohydrate, (MACROBID) 100 MG capsule Take 1 capsule (100 mg total) by mouth 2 (two) times daily. 14 capsule 0    ocrelizumab (OCREVUS) 30 mg/mL Soln Every six months.      SYNTHROID 88 mcg tablet TAKE 1 TABLET BY MOUTH BEFORE BREAKFAST. 90 tablet 3     No current facility-administered medications for this visit.       Review of patient's allergies indicates:   Allergen Reactions    Natalizumab        Well woman:  Pap test: 10/2022 normal per report.  History of abnormal paps: Yes - in s-- had cryo.  History of STIs:  No  Mammogram: Date of last: 2022.  Result: Normal per patient request  Colonoscopy: Date of last: 2023.  Result:  polyp per patient report-- repeat in 7 years.    DEXA:  n/a     ROS:  As per HPI.      Exam  There were no vitals taken for this visit.  General: alert and oriented, no acute distress  Respiratory: normal respiratory effort  Abd: soft, non-tender, non-distended    Pelvic--deferred    Impression  No diagnosis  found.  We reviewed the above issues and discussed options for short-term versus long-term management of her problems.   Plan:      1)  Mixed urinary incontinence, urge > stress:    --Empty bladder every 3 hours.  Empty well: wait a minute, lean forward on toilet.    --Avoid dietary irritants (see sheet).  Keep diary x 3-5 days to determine your irritants.  --continue pelvic floor PT with [facility] [address] [place] [address] Phone [phone] Fax [phone]  --URGE: trial gemtesa 75 mg daily  Text GEMTESA to [id number]   --will check urine culture     2)Constipation:  --  continue fiber supplement     3)RTC 2 months for virtual visit    Face to Face time with patient: 18  20 minutes of total time spent on the encounter, which includes face to face time and non-face to face time preparing to see the patient (eg, review of tests), Obtaining and/or reviewing separately obtained history, Documenting clinical information in the electronic or other health record, Independently interpreting results (not separately reported) and communicating results to the patient/family/caregiver, or Care coordination (not separately reported).     Cinthya Kumari, MISAEL-BC  [facility]  Division of Female Pelvic Medicine and Reconstructive Surgery  Department of Obstetrics & Gynecology

## 2023-07-19 NOTE — PATIENT INSTRUCTIONS
1)  Mixed urinary incontinence, urge > stress:    --Empty bladder every 3 hours.  Empty well: wait a minute, lean forward on toilet.    --Avoid dietary irritants (see sheet).  Keep diary x 3-5 days to determine your irritants.  --continue pelvic floor PT with [facility] [address] [place] [address] Phone [phone] Fax [phone]  --URGE: trial gemtesa 75 mg daily  Text GEMTESA to [id number]   --will check urine culture     2)Constipation:  --  continue fiber supplement     3)RTC 2 months for virtual visit

## 2023-07-20 ENCOUNTER — LAB VISIT (OUTPATIENT)
Dept: LAB | Facility: HOSPITAL | Age: 48
End: 2023-07-20
Attending: NURSE PRACTITIONER
Payer: COMMERCIAL

## 2023-07-20 DIAGNOSIS — N39.41 URGE INCONTINENCE: ICD-10-CM

## 2023-07-20 LAB
BILIRUB UR QL STRIP: NEGATIVE
CLARITY UR: CLEAR
COLOR UR: YELLOW
GLUCOSE UR QL STRIP: NEGATIVE
HGB UR QL STRIP: NEGATIVE
KETONES UR QL STRIP: NEGATIVE
LEUKOCYTE ESTERASE UR QL STRIP: NEGATIVE
NITRITE UR QL STRIP: NEGATIVE
PH UR STRIP: 7 [PH] (ref 5–8)
PROT UR QL STRIP: NEGATIVE
SP GR UR STRIP: >=1.03 (ref 1–1.03)
URN SPEC COLLECT METH UR: ABNORMAL
UROBILINOGEN UR STRIP-ACNC: NEGATIVE EU/DL

## 2023-07-20 PROCEDURE — 81003 URINALYSIS AUTO W/O SCOPE: CPT | Performed by: NURSE PRACTITIONER

## 2023-07-21 ENCOUNTER — PATIENT MESSAGE (OUTPATIENT)
Dept: PSYCHIATRY | Facility: CLINIC | Age: 48
End: 2023-07-21
Payer: COMMERCIAL

## 2023-07-26 ENCOUNTER — CLINICAL SUPPORT (OUTPATIENT)
Dept: REHABILITATION | Facility: HOSPITAL | Age: 48
End: 2023-07-26
Payer: COMMERCIAL

## 2023-07-26 DIAGNOSIS — N39.46 MIXED INCONTINENCE URGE AND STRESS: ICD-10-CM

## 2023-07-26 DIAGNOSIS — R27.8 COORDINATION ABNORMAL: ICD-10-CM

## 2023-07-26 DIAGNOSIS — N81.89 PELVIC FLOOR WEAKNESS: Primary | ICD-10-CM

## 2023-07-26 PROCEDURE — 97530 THERAPEUTIC ACTIVITIES: CPT | Mod: PN | Performed by: PHYSICAL THERAPIST

## 2023-07-26 PROCEDURE — 97110 THERAPEUTIC EXERCISES: CPT | Mod: PN | Performed by: PHYSICAL THERAPIST

## 2023-07-26 NOTE — PROGRESS NOTES
Pelvic Health Physical Therapy   Treatment Note and Progress note     Name: Thu PEREZ Lukianna  Clinic Number: [id number]    Therapy Diagnosis:   Encounter Diagnoses   Name Primary?    Pelvic floor weakness Yes    Mixed incontinence urge and stress     Coordination abnormal        Physician: Cinthya Kumari NP    Visit Date: 7/26/2023    Physician Orders: PT Eval and Treat PF PT  Medical Diagnosis from Referral:   N39.46 (ICD-10-CM) - Mixed incontinence urge and stress   N81.89 (ICD-10-CM) - Pelvic floor weakness      Evaluation Date: 5/5/2023  Authorization Period Expiration: 4/10/24  Plan of Care Expiration: 9/20/2023  Progress Note Due: 8/23/2023  Visit # 10/20 Visits authorized: 10/24   FOTO: 2/3  Cancelled Visits: 0  No Show Visits: 0    Time In: 9:53 AM   Time Out: 10:31 AM   Total Billable Time: 38 minutes    Precautions: Standard    Subjective     Pt reports:  Back pain has improved. Going more frequently to the restroom - every 2.5 hours - which has helped her urgency. States that her hips are still bothering her - pain more on the left hip.     She was compliant with home exercise program.  Response to previous treatment: none  Functional change: none    Pain in:  3-4/10 left hip pain  Pain out: 3-4 /10  Location: none      Objective   Thu received therapeutic exercises to develop  strength, endurance, posture, and core stabilization for 30 minutes including:  strength and stability training per log to assist with functional mobility. Physical therapist educated pt on lower extremity ROM to assist with right ankle pain upon waking up.     Thu participated in dynamic functional therapeutic activities to improve functional performance for 8  minutes, including:  Educated on monitoring bladder habits with new medication to determine if there is an improvement. I will follow- up with her in about a month.        Intervention Eval  06/01/2023 06/05/2023 06/12/2023 06/19/2023 06/26/2023 07/12/2023   07/26/2023                Neuro Re-Ed Rehabilitative ultrasound   pelvic floor downtraining with assist of RUSI  and rehabilitative ultrasound imaging to help visualize pelvic floor muscle contraction and relaxation with kegels. Improved pelvic floor mobility. Still with limited PF activation and awareness. Used rehabilitative ultrasound to assist with TA activation. Was able to perform with decreased intra-abdominal pressure with practice.    rehabilitative ultrasound imaging to help visualize pelvic floor muscle contraction and relaxation with kegels  Core and PF awareness not to generate excessive abdominal pressure.   Rehabilitative ultrasound   Bladder volume = 20mL  Core and abdominal management RUSI for breathing, drops, contractions of the PFM to help pt visualize PFM motion and function. Educated on and practiced core and PF activation without increased intra-abdominal pressure.   24mL of urine in the bladder - had urinated over an hour ago. Had not had much to drink.    Manual Ther Vaginal exam            TherAct Gastro Colic Relfex   Physical therapist performed abdominal massage and educated on bowel habits with traveling.          Neuro Re-ed Core coordination   core and pelvic floor strength and coordination training. Provided with home exercise program. Educated on core awareness with functional mobility. Provided tactile cues for core awareness. Educated on quick core activation with throat clearing.         TherAct Education    Encouraged improved bowel habits. States that her stool is soft, just hard to pass. Educated on water intake, abdominal massage, use of squatty potty. Educated pt on the fact that her impaired sensation could be making bowel and bladder evacuation a challenge.    Encouraged improved bowel habits with use of squatty potty. Being mindful of bowel and bladder habits with a change in routine.  Down training, breathing, anxiety management Educated on further bladder testing to  determine the function of her bladder and pelvic floor.  I sent a message do Cinthya. Discussed energy conservation with use of a loft strand crutch with weakness/fatigue. May also assist with hip pain.      Educated on monitoring bladder habits with new medication to determine if there is an improvement. I will follow- up with her in about a month.       TherEx SAQ         2.5lb 3x10   TherEx Hip abd with red t-band        3x10    TherEx Bridges        3x10   TherEx Heel slides           TherEx Clams               Home Exercises Provided and Patient Education Provided     Education provided:   - anatomy/physiology of pelvic floor and posture/body mechanices  Discussed progression of plan of care with patient; educated pt in activity modification; reviewed HEP with pt. Pt demonstrated and verbalized understanding of all instruction and was provided with a handout of HEP (see Patient Instructions).    Written Home Exercises Provided: yes.  Exercises were reviewed and Thu was able to demonstrate them prior to the end of the session.  Thu demonstrated good  understanding of the education provided.     See EMR under Patient Instructions for exercises provided 07/26/2023 .    Assessment     Still with limited carryover with physical therapist recommendations. Needs better mobility and movement overall.     Thu Is progressing well towards her goals.   Pt prognosis is Excellent.     Pt will continue to benefit from skilled outpatient physical therapy to address the deficits listed in the problem list box on initial evaluation, provide pt/family education and to maximize pt's level of independence in the home and community environment.     Pt's spiritual, cultural and educational needs considered and pt agreeable to plan of care and goals.     Anticipated barriers to physical therapy: none     Short Term Goals: 6 weeks   Patient will deny urgency of urine. Goal not met - progressing  Patient will be independent with  pelvic floor relaxation to improve bowel and bladder evacuation. Goal met - 06/19/2023   Patient will be able to demonstrate improved pelvic floor relaxation and contraction on rehabilitative ultrasound. Goal not met - progressing  Patient will report feeling the urge to urinate prior to it being too late. Goal not met - progressing  Patient will report improved water intake. Goal met - 06/19/2023      Long Term Goals: 12 weeks   Patient will report urinating every 3-4 hours with strong urine stream. Goal not met - progressing  Patient will deny nocturia. Goal not met - progressing  Patient will demonstrate adequate pelvic floor coordination with contraction and relaxation on sEMG vs rehabilitative ultrasound. Goal not met - progressing  Patient will deny constipation and straining for bowel movements. Goal not met - progressing    Plan     Will follow-up with patient in 1 month and determine the need for further physical therapist.     Shmuel Saul, PT

## 2023-08-08 ENCOUNTER — TELEPHONE (OUTPATIENT)
Dept: NEUROLOGY | Facility: CLINIC | Age: 48
End: 2023-08-08
Payer: COMMERCIAL

## 2023-08-08 NOTE — TELEPHONE ENCOUNTER
Received General Blood safety labs, completed on 8/4/23, from [facility]. Uploaded for review.

## 2023-08-10 NOTE — PROGRESS NOTES
"        Patient ID: [id number]  The patient location is: she is at her office in [place], [place]  The chief complaint leading to consultation is: routine MS follow up    Visit type: audiovisual    Face to Face time with patient: 21  43 minutes of total time spent on the encounter, which includes face to face time and non-face to face time preparing to see the patient (eg, review of tests), Obtaining and/or reviewing separately obtained history, Documenting clinical information in the electronic or other health record, Independently interpreting results (not separately reported) and communicating results to the patient/family/caregiver, or Care coordination (not separately reported).     Each patient to whom he or she provides medical services by telemedicine is:  (1) informed of the relationship between the physician and patient and the respective role of any other health care provider with respect to management of the patient; and (2) notified that he or she may decline to receive medical services by telemedicine and may withdraw from such care at any time.    Notes:     Subjective:     LETTY Juares is a 48 y.o. RH female with hyperlipidemia, hypothyroidism s/p thyroidectomy due to bilateral thyroid nodules in 2010 and multiple sclerosis. she is presenting today via video for routine follow up.    Interval history (8/14/2023):  She reports no new neurological symptoms, continues to struggle with fatigue.  She tried the low dose baclofen at nights, reports partial response, there are nights that the medication doesn't help at all, denies any side effects.  She was evaluated by Dr. Cota on 6/15: "Neuro-ophthalmologic examination was notable for excellent visual acuities, full color vision, normal ocular motility and alignment. OCT with mild RNFL thinning OS. Likely had mild left eye optic neuritis in the past. Formal visual fields were bilateral mild inferior depression".  She also saw urogynecology on 7/19 and " diagnosed with mixed urinary incontinence, she was prescribed Gemtesa however she forgot to pick it up.  Pre infusion labs were notable for low IgM again, denies frequent cold, flu, COVID, etc. Just today started noticing sore throat, her next Ocrevus is scheduled in 2 days.    Symptom History (1/17/2023):  Aug/2004 - she was 7 months postpartum, was cutting the grass in the heat outside. Developed an acute episode of vertigo and N/V. It was initially treated symptomatically, her ENT obtained and MRI since symptoms did not resolve. She was diagnosed with MS with enhancing and non-enhancing brain lesions and abnormal visual evoked potentials. She had another relapse probably around Oct 2004. MRI of cord or spinal tap was not performed at the time. She was started on Betaseron and then Copaxone (lymphadenopathy?), then switched back to Betaseron for another 10 years  [id number] - tried Tysabri x3, however due to reaction to Tysabri had to go back on Betaseron.  [id number] - had an episode of headache, N/V, difficulty getting the words out and gray curtain over the visual field (binocular), lasted a few hours and resolved with sleep, was told she had ocular migraine.   3/2018 - she can not recall what symptoms she was having around that time but it required 3 days of IVMP  10/2018 - she was switched to Ocrevus due to presence of cord lesions. the first infusion was followed by a period of fatigue and fever, but the second infusion was tolerated better. Currently tolerates well.   She was following with Dr. Urban up until recently, she is now transferring care to [facility] since she has left the state.     Today - she reports intermittent tingling and tightness along bilateral jaw which responds to oral steroids. Intermittently gets twitching in face/eyelids, hasn't tried the baclofen. She is up to date with her mammogram.     MS ROS:   Fatigue: Yes - continued fatigue  Sleep Disturbance: Yes - wakes up in the middle of the  night and can't go back to sleep   Bladder Dysfunction: Yes - mixed incontinence, has had 2 UTIs since last visit  Bowel Dysfunction: Yes - constipation and incontinence once every few months   Spasticity: Yes - tightness in the mornings, myoclonic jerks at nightd   Lhermitte's Sign: No   Visual Symptoms: Yes - as mentioned above   Cognitive: Yes - difficulty remembering when initiating a process but then it gets easier   Mood Disorder: Yes - depression, job related, unchanged or slightly better   Gait Disturbance: Yes - balance is off, veering off   Falls: No  Hand Dysfunction: did not assess  Pain: Yes - feet and calves hurt (like a muscle ache)   Tremors: No   Dysphagia: Yes - solids and liquid   Dysarthria: Yes - just 2 episodes which lasted a few hours   Heat sensitivity: Yes - more fatigued and off balance   Exercise: no  Diet: regular       Past Medical History:  Past Medical History:   Diagnosis Date    Hypothyroidism surgical for benign nodules    Multiple sclerosis     x2  Tubal ligation    Allergies:  Review of patient's allergies indicates:   Allergen Reactions    Natalizumab      Pertinent Family History:  A second cousin with MS. A first cousin has RA. A paternal aunt had lupus.    Pertinent Social History:  No tobacco, rare alcohol, no marijuana (other than CBD oil), no illicit substance use. Lives with  and 2 children (20 y/o boy and 8 y/o girl), and 2 dogs.  to a , very stressful. Has 16 months before she can retire at 22 years.    Medications:  Current Outpatient Medications   Medication Instructions    atorvastatin (LIPITOR) 40 mg, Oral, Daily    baclofen (LIORESAL) 5 mg, Oral, Nightly PRN    GEMTESA 75 mg, Oral, Daily    levocetirizine dihydrochloride (LEVOCETIRIZINE ORAL) 5 mg, Oral, Daily PRN    nitrofurantoin, macrocrystal-monohydrate, (MACROBID) 100 MG capsule 100 mg, Oral, 2 times daily    ocrelizumab (OCREVUS) 30 mg/mL Soln Every six months.     SYNTHROID 88 mcg tablet TAKE 1 TABLET BY MOUTH BEFORE BREAKFAST.      Disease Modifying Therapy:   - Betaseron, Copaxone  - Tysabri (shortness of breath and flushing during 3rd infusion)  - Ocrevus, Dosing in Feb-Aug    Other relevant medications:   Vitamin D: hasn't been taking recently   Muscle relaxant: Baclofen 5 mg BID PRN (for facial twitching), not taking  Sleep disturbance: CBD oil helped but she ran out    Vaccination status:  -    Objective:     *exam is limited due to the virtual nature of this visit    General:  Well-appearing, well-nourished, NAD, cooperative    Neurologic Exam:   Awake, alert and oriented x3  Speech spontaneous and fluent, intact comprehension.   Adequate fund of knowledge, vocabulary.  EOM intact. No ophthalmoplegia.   Facial expression is full and symmetric.   Hearing is intact.  Antigravity in BUE. No tremor.    EDSS (1/17/2023): 4 (brainstem FS 2, pyramidal FS 2, cerebellar FS 1, sensory FS 3, B/B FS 2, cerebral FS 1)    Pertinent lab results    8/4/2023  CBC/Diff: ALC 900s  CMP nl  HBsAg -, HBcAb -  IgG and IgA nl, IgM 32 (L)    Lab Results   Component Value Date    NPI22SHRP Non-reactive 02/01/2023    HEPBSAG Non-reactive 02/01/2023    HEPBSAB <3.00 02/01/2023    HEPBSAB Non-reactive 02/01/2023    HEPBCAB Non-reactive 02/01/2023    TBGOLDPLUS Negative 02/01/2023     Lab Results   Component Value Date    IGGSERUM 1104 02/01/2023     02/01/2023    IGM 33 (L) 02/01/2023    TSH 2.265 11/14/2022    FREET4 1.12 11/14/2022    WBC 8.80 02/01/2023    LYMPH 1.1 02/01/2023    LYMPH 12.0 (L) 02/01/2023    RBC 4.90 02/01/2023    HGB 15.1 02/01/2023    HCT 45.2 02/01/2023    MCV 92 02/01/2023     02/01/2023     02/01/2023    K 3.9 02/01/2023    CO2 26 02/01/2023    BUN 13 02/01/2023    CREATININE 0.8 02/01/2023    CALCIUM 9.5 02/01/2023    AST 19 02/01/2023    ALT 17 02/01/2023 11/9/2022  CBC/Diff nl  CMP nl    Pertinent imaging results    *Images personally reviewed  and interpreted:    2/1/2023  MRI Brain wo contrast:  Stable demyelinating plaques without any new discrete lesions    MRI Cervical + Thoracic Spine wo contrast:  Patchy foci of T2/STIR hyperintense signal throughout the cervicothoracic cord  No previous studies available for comparison       9/22/2021  MRI Brain w/wo contrast:  Numerous T2/FLAIR hyperintense lesions within bilateral supratentorial white matter   Multiple juxtacortical lesions, periventricular lesions adjacent to posterior horn of the lateral ventricles and 4th ventricle  Notable involvement of posterior parietal lobe (L>R) compared to the frontal lobes            2/17/2021  MRI Brain w/wo contrast:      *no images available for studies below, per radiology report:    2/4/2019  MRI Brain w/wo contrast:  Demyelinating disease burden is moderate in severity, with features typical of MS including juxtacortical lesions and temporal lobe lesions. Black holes are present adjacent to the lateral ventricle occipital horns. When compared to February 2018, disease burden appears stable. After contrast injection, no abnormal enhancement is observed.     MRI Cervical Spine w/wo contrast:  Stable extensive confluent plaques in the cervical spinal cord. No postcontrast enhancement identified to suggest active disease. C3-C4 and C4-C5 mild left intervertebral foraminal stenosis    2/16/2018  MRI Thoracic Spine w/wo contrast:  Cord inhomogeneity left of midline on sagittal imaging within the mid thoracic segments is favored to be volume averaging artifact. A discrete cord lesion is not appreciated on short axis axial imaging nor is there evidence of pathologic cord enhancement. The thoracic spinal canal is widely patent at all levels with no localized disc displacement or herniation identified    Other pertinent studies  None    Assessment:   Thu Juares is a 48 y.o. right-handed female with hypothyroidism and longstanding multiple sclerosis who presents for  routine follow up. She has accumulated moderate disability over time yet has remained somewhat stable more recently on Ocrevus infusions, today we did not have the opportunity to repeat the full neurological exam due to the virtual nature of this visit. Her next infusion is scheduled in 2 days, however, she is reporting emerging URI symptoms. For this reason I recommended that we postpone the infusion for 1 week. I advised her to go to an urgent care for evaluation if her symptoms persist until tomorrow or get worse.     Regarding her urinary symptoms, I encouraged her to  her prescription to see if there will be any improvement in her symptoms with the medication. Regarding spasticity, I recommended to increase the nightly dose of baclofen to 10 mg for better response. I advised her to reach out through the portal and let me know how she responds to the new dose. I will see her in clinic for an in person evaluation in 6 months, we will have the annual MRIs done preferably before the appointment.     1. Multiple sclerosis    2. Encounter for monitoring immunomodulating therapy    3. Myoclonic jerking    4. Spasticity      Plan:     Diagnosis and surveillance: Multiple Sclerosis  Imaging: MRI Brain and Cervical Spine wo contrast ordered for 2/2024    Disease Modifying Therapies:   Continue Ocrevus q6 months infusions, will postpone for 1 week to allow recovery from current URI  Pre-infusion labs reviewed    Symptom Management/Life style modifications  Increase Baclofen to 10 mg nightly PRN   Advised to keep a log of sickness/infections    Follow up: RTC in 6 month with MRI results      Plan was discussed in detail with the patient, who is in agreement.        Cintia Kitchen MD    [facility]  08/14/2023

## 2023-08-14 ENCOUNTER — OFFICE VISIT (OUTPATIENT)
Dept: NEUROLOGY | Facility: CLINIC | Age: 48
End: 2023-08-14
Payer: COMMERCIAL

## 2023-08-14 DIAGNOSIS — Z79.899 ENCOUNTER FOR MONITORING IMMUNOMODULATING THERAPY: ICD-10-CM

## 2023-08-14 DIAGNOSIS — R25.2 SPASTICITY: ICD-10-CM

## 2023-08-14 DIAGNOSIS — G35 MULTIPLE SCLEROSIS: Primary | ICD-10-CM

## 2023-08-14 DIAGNOSIS — Z51.81 ENCOUNTER FOR MONITORING IMMUNOMODULATING THERAPY: ICD-10-CM

## 2023-08-14 DIAGNOSIS — G25.3 MYOCLONIC JERKING: ICD-10-CM

## 2023-08-14 PROCEDURE — 99215 OFFICE O/P EST HI 40 MIN: CPT | Mod: 95,,, | Performed by: STUDENT IN AN ORGANIZED HEALTH CARE EDUCATION/TRAINING PROGRAM

## 2023-08-14 PROCEDURE — 99215 PR OFFICE/OUTPT VISIT, EST, LEVL V, 40-54 MIN: ICD-10-PCS | Mod: 95,,, | Performed by: STUDENT IN AN ORGANIZED HEALTH CARE EDUCATION/TRAINING PROGRAM

## 2023-08-17 ENCOUNTER — PATIENT MESSAGE (OUTPATIENT)
Dept: NEUROLOGY | Facility: CLINIC | Age: 48
End: 2023-08-17
Payer: COMMERCIAL

## 2023-08-21 ENCOUNTER — PATIENT MESSAGE (OUTPATIENT)
Dept: REHABILITATION | Facility: HOSPITAL | Age: 48
End: 2023-08-21
Payer: COMMERCIAL

## 2023-08-22 NOTE — TELEPHONE ENCOUNTER
Request sent to [facility] to reschedule pt's Ocrevus to early next week.

## 2023-09-18 ENCOUNTER — PATIENT MESSAGE (OUTPATIENT)
Dept: NEUROLOGY | Facility: CLINIC | Age: 48
End: 2023-09-18
Payer: COMMERCIAL

## 2023-09-18 DIAGNOSIS — R42 VERTIGO: ICD-10-CM

## 2023-09-18 DIAGNOSIS — G35 MULTIPLE SCLEROSIS: Primary | ICD-10-CM

## 2023-09-22 ENCOUNTER — DOCUMENTATION ONLY (OUTPATIENT)
Dept: NEUROLOGY | Facility: CLINIC | Age: 48
End: 2023-09-22
Payer: COMMERCIAL

## 2023-10-11 ENCOUNTER — PATIENT MESSAGE (OUTPATIENT)
Dept: NEUROLOGY | Facility: CLINIC | Age: 48
End: 2023-10-11
Payer: COMMERCIAL

## 2023-11-06 ENCOUNTER — OFFICE VISIT (OUTPATIENT)
Dept: UROGYNECOLOGY | Facility: CLINIC | Age: 48
End: 2023-11-06
Payer: COMMERCIAL

## 2023-11-06 DIAGNOSIS — N39.41 URGE INCONTINENCE: Primary | ICD-10-CM

## 2023-11-06 DIAGNOSIS — N81.89 PELVIC FLOOR WEAKNESS: ICD-10-CM

## 2023-11-06 DIAGNOSIS — K59.00 CONSTIPATION, UNSPECIFIED CONSTIPATION TYPE: ICD-10-CM

## 2023-11-06 PROCEDURE — 99213 OFFICE O/P EST LOW 20 MIN: CPT | Mod: 95,,, | Performed by: NURSE PRACTITIONER

## 2023-11-06 PROCEDURE — 99213 PR OFFICE/OUTPT VISIT, EST, LEVL III, 20-29 MIN: ICD-10-PCS | Mod: 95,,, | Performed by: NURSE PRACTITIONER

## 2023-11-06 PROCEDURE — 1159F PR MEDICATION LIST DOCUMENTED IN MEDICAL RECORD: ICD-10-PCS | Mod: CPTII,95,, | Performed by: NURSE PRACTITIONER

## 2023-11-06 PROCEDURE — 1160F PR REVIEW ALL MEDS BY PRESCRIBER/CLIN PHARMACIST DOCUMENTED: ICD-10-PCS | Mod: CPTII,95,, | Performed by: NURSE PRACTITIONER

## 2023-11-06 PROCEDURE — 1159F MED LIST DOCD IN RCRD: CPT | Mod: CPTII,95,, | Performed by: NURSE PRACTITIONER

## 2023-11-06 PROCEDURE — 1160F RVW MEDS BY RX/DR IN RCRD: CPT | Mod: CPTII,95,, | Performed by: NURSE PRACTITIONER

## 2023-11-06 NOTE — PATIENT INSTRUCTIONS
1)  Mixed urinary incontinence, urge > stress:    --Empty bladder every 3 hours.  Empty well: wait a minute, lean forward on toilet.    --Avoid dietary irritants (see sheet).  Keep diary x 3-5 days to determine your irritants.  --do pelvic floor home exercise program  --URGE: trial gemtesa 75 mg daily        2)Constipation:  -- rettart fiber supplement     3)RTC 6 months for virtual visit--send me a message in 30 days to let me know how you are doing on gemtessa

## 2023-11-06 NOTE — PROGRESS NOTES
Urogyn follow up  07/19/2023  .  [facility] - UROGYNECOLOGY  [address] [id number]    Thu Juares  [id number]  1975      Thu Juares is a 48 y.o. here for a urogyn follow up for urinary incontinence    The patient location is: [place]  The chief complaint leading to consultation is: mixed urinary incontinence    Visit type: audiovisual        Each patient to whom he or she provides medical services by telemedicine is:  (1) informed of the relationship between the physician and patient and the respective role of any other health care provider with respect to management of the patient; and (2) notified that he or she may decline to receive medical services by telemedicine and may withdraw from such care at any time.    Notes:      Last HPI from 03/22/2023     1)  UI:  (--) TERRI < (+) UUI  X 3years. Occasionally does not make it to the restroom if holding longer than 3-4 hours-- can not stop the flow of urine (+) pads:mostly for protection.  Daytime frequency: Q 3 hours.  Nocturia: Yes: 1/night.   (--) dysuria,  (--) hematuria,  (--) frequent UTIs.  (+) complete bladder emptying.      2)  POP:  Absent. Symptoms:(--)  .  (--) vaginal bleeding. (--) vaginal discharge. (+) sexually active.  (--) dyspareunia.   (--)  Vaginal dryness.  (--) vaginal estrogen use.  Anorgasma--present for the past year     3)  BM:  (+) constipation/straining.  (--) chronic diarrhea. (--) hematochezia.  (--) fecal incontinence.  (+) fecal smearing/urgency.  (--) complete evacuation.       4)MS  --diagnosed in 2004  --typical flare starts with vertigo. No sleeping can cause exacerbaton as well as word finding  --taking ocrevis--last dose in 02/2023 07/19/2023     1)  Mixed urinary incontinence, urge > stress:    --has been going to pelvic floor PT  --still has urinary urgency  --voiding every 2-3 hours while awake     2)Constipation:  --improved with  fiber supplement    3)complains of RL back  pain    Changes since last visit:  1)  Mixed urinary incontinence, urge > stress:    --completed pelvic floor PT  --symptoms initially improved--has not been compliant with HEP  --voiding every 3 hours while awake  --does have urinary urgency if she ignores urge  --nocturia 1-2/ night  --did not try gemtesa     2)Constipation:  --taking probiotics  --stopped fiber supplement  --intermittent constipation             Past Medical History:   Diagnosis Date    Hypothyroidism surgical for benign nodules    Multiple sclerosis        Past Surgical History:   Procedure Laterality Date     SECTION  10/2013       Family History   Problem Relation Age of Onset    Thyroid disease Neg Hx     Diabetes Neg Hx        Social History     Socioeconomic History    Marital status:    Tobacco Use    Smoking status: Never    Smokeless tobacco: Former   Substance and Sexual Activity    Alcohol use: Not Currently    Drug use: Never    Sexual activity: Yes     Partners: Male       Current Outpatient Medications   Medication Sig Dispense Refill    atorvastatin (LIPITOR) 40 MG tablet Take 40 mg by mouth once daily.      baclofen (LIORESAL) 5 mg Tab tablet TAKE 1 TABLET (5 MG TOTAL) BY MOUTH NIGHTLY AS NEEDED (STIFFNESS AND MUSCLE CRAMPS). 90 tablet 1    levocetirizine dihydrochloride (LEVOCETIRIZINE ORAL) Take 5 mg by mouth daily as needed for Allergies.      nitrofurantoin, macrocrystal-monohydrate, (MACROBID) 100 MG capsule Take 1 capsule (100 mg total) by mouth 2 (two) times daily. 14 capsule 0    ocrelizumab (OCREVUS) 30 mg/mL Soln Every six months.      SYNTHROID 88 mcg tablet TAKE 1 TABLET BY MOUTH BEFORE BREAKFAST. 90 tablet 3    vibegron (GEMTESA) 75 mg Tab Take 75 mg by mouth once daily. 30 tablet 11     No current facility-administered medications for this visit.       Review of patient's allergies indicates:   Allergen Reactions    Natalizumab        Well woman:  Pap test: 10/2022 normal per report.  History of  abnormal paps: Yes - in 1990's-- had cryo.  History of STIs:  No  Mammogram: Date of last: 1/2022.  Result: Normal per patient request  Colonoscopy: Date of last: 02/2023.  Result:  polyp per patient report-- repeat in 7 years.    DEXA:  n/a     ROS:  As per HPI.      Exam  There were no vitals taken for this visit.  General: alert and oriented, no acute distress  Respiratory: normal respiratory effort  Abd: soft, non-tender, non-distended    Pelvic--deferred    Impression  1. Urge incontinence        2. Pelvic floor weakness        3. Constipation, unspecified constipation type          We reviewed the above issues and discussed options for short-term versus long-term management of her problems.   Plan:      1)  Mixed urinary incontinence, urge > stress:    --Empty bladder every 3 hours.  Empty well: wait a minute, lean forward on toilet.    --Avoid dietary irritants (see sheet).  Keep diary x 3-5 days to determine your irritants.  --do pelvic floor home exercise program  --URGE: trial gemtesa 75 mg daily         2)Constipation:  -- rettart fiber supplement     3)RTC 6 months for virtual visit--send me a message in 30 days to let me know how you are doing on gemtessa    Face to Face time with patient: 9  20 minutes of total time spent on the encounter, which includes face to face time and non-face to face time preparing to see the patient (eg, review of tests), Obtaining and/or reviewing separately obtained history, Documenting clinical information in the electronic or other health record, Independently interpreting results (not separately reported) and communicating results to the patient/family/caregiver, or Care coordination (not separately reported).     Cinthya Kumari, P-BC [facility]  Division of Female Pelvic Medicine and Reconstructive Surgery  Department of Obstetrics & Gynecology

## 2023-12-05 ENCOUNTER — PATIENT MESSAGE (OUTPATIENT)
Dept: NEUROLOGY | Facility: CLINIC | Age: 48
End: 2023-12-05
Payer: COMMERCIAL

## 2023-12-10 ENCOUNTER — PATIENT MESSAGE (OUTPATIENT)
Dept: UROGYNECOLOGY | Facility: CLINIC | Age: 48
End: 2023-12-10
Payer: COMMERCIAL

## 2023-12-10 DIAGNOSIS — R30.0 DYSURIA: Primary | ICD-10-CM

## 2023-12-11 ENCOUNTER — LAB VISIT (OUTPATIENT)
Dept: LAB | Facility: HOSPITAL | Age: 48
End: 2023-12-11
Attending: NURSE PRACTITIONER
Payer: COMMERCIAL

## 2023-12-11 DIAGNOSIS — R30.0 DYSURIA: ICD-10-CM

## 2023-12-11 LAB
BILIRUB UR QL STRIP: NEGATIVE
CLARITY UR: CLEAR
COLOR UR: YELLOW
GLUCOSE UR QL STRIP: NEGATIVE
HGB UR QL STRIP: ABNORMAL
KETONES UR QL STRIP: NEGATIVE
LEUKOCYTE ESTERASE UR QL STRIP: NEGATIVE
NITRITE UR QL STRIP: NEGATIVE
PH UR STRIP: 6 [PH] (ref 5–8)
PROT UR QL STRIP: ABNORMAL
SP GR UR STRIP: >=1.03 (ref 1–1.03)
URN SPEC COLLECT METH UR: ABNORMAL
UROBILINOGEN UR STRIP-ACNC: NEGATIVE EU/DL

## 2023-12-11 PROCEDURE — 87086 URINE CULTURE/COLONY COUNT: CPT | Performed by: NURSE PRACTITIONER

## 2023-12-11 PROCEDURE — 81003 URINALYSIS AUTO W/O SCOPE: CPT | Performed by: NURSE PRACTITIONER

## 2023-12-11 PROCEDURE — 87088 URINE BACTERIA CULTURE: CPT | Performed by: NURSE PRACTITIONER

## 2023-12-11 PROCEDURE — 87186 SC STD MICRODIL/AGAR DIL: CPT | Performed by: NURSE PRACTITIONER

## 2023-12-11 PROCEDURE — 87077 CULTURE AEROBIC IDENTIFY: CPT | Performed by: NURSE PRACTITIONER

## 2023-12-12 RX ORDER — CEPHALEXIN 500 MG/1
500 CAPSULE ORAL 2 TIMES DAILY
Qty: 14 CAPSULE | Refills: 0 | Status: SHIPPED | OUTPATIENT
Start: 2023-12-12 | End: 2023-12-19

## 2023-12-13 LAB — BACTERIA UR CULT: ABNORMAL

## 2023-12-14 ENCOUNTER — DOCUMENTATION ONLY (OUTPATIENT)
Dept: NEUROLOGY | Facility: CLINIC | Age: 48
End: 2023-12-14
Payer: COMMERCIAL

## 2023-12-14 ENCOUNTER — PATIENT MESSAGE (OUTPATIENT)
Dept: UROGYNECOLOGY | Facility: CLINIC | Age: 48
End: 2023-12-14
Payer: COMMERCIAL

## 2023-12-14 NOTE — PROGRESS NOTES
Faxed pt's most recent office note from 8/14/2023 virtual appt with Dr. Kitchen to Located within [facility] at [phone].

## 2024-01-11 ENCOUNTER — TELEPHONE (OUTPATIENT)
Dept: NEUROLOGY | Facility: CLINIC | Age: 49
End: 2024-01-11
Payer: COMMERCIAL

## 2024-01-11 NOTE — TELEPHONE ENCOUNTER
----- Message from Henna Brand RN sent at 1/11/2024  1:16 PM CST -----    ----- Message -----  From: Cristela Galindo  Sent: 1/11/2024  12:57 PM CST  To: [facility] Staff    Pt would like a call back  concerning infusion  and MRI    Can be reach at [phone]

## 2024-01-22 ENCOUNTER — PATIENT MESSAGE (OUTPATIENT)
Dept: PSYCHIATRY | Facility: CLINIC | Age: 49
End: 2024-01-22
Payer: COMMERCIAL

## 2024-02-04 ENCOUNTER — HOSPITAL ENCOUNTER (OUTPATIENT)
Dept: RADIOLOGY | Facility: HOSPITAL | Age: 49
Discharge: HOME | End: 2024-02-04
Attending: STUDENT IN AN ORGANIZED HEALTH CARE EDUCATION/TRAINING PROGRAM
Payer: COMMERCIAL

## 2024-02-04 DIAGNOSIS — G35 MULTIPLE SCLEROSIS: ICD-10-CM

## 2024-02-04 PROCEDURE — 72141 MRI NECK SPINE W/O DYE: CPT | Mod: TC

## 2024-02-04 PROCEDURE — 70551 MRI BRAIN STEM W/O DYE: CPT | Mod: TC

## 2024-02-04 PROCEDURE — 70551 MRI BRAIN STEM W/O DYE: CPT | Mod: 26,,, | Performed by: RADIOLOGY

## 2024-02-04 PROCEDURE — 72141 MRI NECK SPINE W/O DYE: CPT | Mod: 26,,, | Performed by: RADIOLOGY

## 2024-02-07 ENCOUNTER — LAB VISIT (OUTPATIENT)
Dept: LAB | Facility: HOSPITAL | Age: 49
End: 2024-02-07
Payer: COMMERCIAL

## 2024-02-07 ENCOUNTER — OFFICE VISIT (OUTPATIENT)
Dept: NEUROLOGY | Facility: CLINIC | Age: 49
End: 2024-02-07
Payer: COMMERCIAL

## 2024-02-07 VITALS
HEIGHT: 64 IN | DIASTOLIC BLOOD PRESSURE: 85 MMHG | SYSTOLIC BLOOD PRESSURE: 124 MMHG | BODY MASS INDEX: 26.2 KG/M2 | WEIGHT: 153.44 LBS | HEART RATE: 79 BPM

## 2024-02-07 DIAGNOSIS — G35 MULTIPLE SCLEROSIS: Primary | ICD-10-CM

## 2024-02-07 DIAGNOSIS — E55.9 VITAMIN D DEFICIENCY: ICD-10-CM

## 2024-02-07 DIAGNOSIS — G35 MULTIPLE SCLEROSIS: ICD-10-CM

## 2024-02-07 LAB
ALBUMIN SERPL BCP-MCNC: 4.2 G/DL (ref 3.5–5.2)
ALP SERPL-CCNC: 77 U/L (ref 55–135)
ALT SERPL W/O P-5'-P-CCNC: 18 U/L (ref 10–44)
ANION GAP SERPL CALC-SCNC: 7 MMOL/L (ref 8–16)
AST SERPL-CCNC: 19 U/L (ref 10–40)
BASOPHILS # BLD AUTO: 0.06 K/UL (ref 0–0.2)
BASOPHILS NFR BLD: 0.6 % (ref 0–1.9)
BILIRUB SERPL-MCNC: 0.5 MG/DL (ref 0.1–1)
BUN SERPL-MCNC: 17 MG/DL (ref 6–20)
CALCIUM SERPL-MCNC: 9.7 MG/DL (ref 8.7–10.5)
CHLORIDE SERPL-SCNC: 105 MMOL/L (ref 95–110)
CO2 SERPL-SCNC: 30 MMOL/L (ref 23–29)
CREAT SERPL-MCNC: 0.8 MG/DL (ref 0.5–1.4)
DIFFERENTIAL METHOD BLD: ABNORMAL
EOSINOPHIL # BLD AUTO: 0.1 K/UL (ref 0–0.5)
EOSINOPHIL NFR BLD: 1.1 % (ref 0–8)
ERYTHROCYTE [DISTWIDTH] IN BLOOD BY AUTOMATED COUNT: 11.7 % (ref 11.5–14.5)
EST. GFR  (NO RACE VARIABLE): >60 ML/MIN/1.73 M^2
GLUCOSE SERPL-MCNC: 90 MG/DL (ref 70–110)
HBV CORE AB SERPL QL IA: NORMAL
HBV SURFACE AG SERPL QL IA: NORMAL
HCT VFR BLD AUTO: 41.7 % (ref 37–48.5)
HGB BLD-MCNC: 14.1 G/DL (ref 12–16)
IGA SERPL-MCNC: 146 MG/DL (ref 40–350)
IGG SERPL-MCNC: 1100 MG/DL (ref 650–1600)
IGM SERPL-MCNC: 34 MG/DL (ref 50–300)
IMM GRANULOCYTES # BLD AUTO: 0.03 K/UL (ref 0–0.04)
IMM GRANULOCYTES NFR BLD AUTO: 0.3 % (ref 0–0.5)
LYMPHOCYTES # BLD AUTO: 1.3 K/UL (ref 1–4.8)
LYMPHOCYTES NFR BLD: 13.8 % (ref 18–48)
MCH RBC QN AUTO: 31.1 PG (ref 27–31)
MCHC RBC AUTO-ENTMCNC: 33.8 G/DL (ref 32–36)
MCV RBC AUTO: 92 FL (ref 82–98)
MONOCYTES # BLD AUTO: 0.7 K/UL (ref 0.3–1)
MONOCYTES NFR BLD: 7.8 % (ref 4–15)
NEUTROPHILS # BLD AUTO: 7.2 K/UL (ref 1.8–7.7)
NEUTROPHILS NFR BLD: 76.4 % (ref 38–73)
NRBC BLD-RTO: 0 /100 WBC
PLATELET # BLD AUTO: 307 K/UL (ref 150–450)
PMV BLD AUTO: 9.2 FL (ref 9.2–12.9)
POTASSIUM SERPL-SCNC: 3.9 MMOL/L (ref 3.5–5.1)
PROT SERPL-MCNC: 7.3 G/DL (ref 6–8.4)
RBC # BLD AUTO: 4.54 M/UL (ref 4–5.4)
SODIUM SERPL-SCNC: 142 MMOL/L (ref 136–145)
WBC # BLD AUTO: 9.36 K/UL (ref 3.9–12.7)

## 2024-02-07 PROCEDURE — 85025 COMPLETE CBC W/AUTO DIFF WBC: CPT

## 2024-02-07 PROCEDURE — 36415 COLL VENOUS BLD VENIPUNCTURE: CPT

## 2024-02-07 PROCEDURE — 86704 HEP B CORE ANTIBODY TOTAL: CPT

## 2024-02-07 PROCEDURE — 3079F DIAST BP 80-89 MM HG: CPT | Mod: CPTII,S$GLB,,

## 2024-02-07 PROCEDURE — 82784 ASSAY IGA/IGD/IGG/IGM EACH: CPT | Mod: 59

## 2024-02-07 PROCEDURE — 1160F RVW MEDS BY RX/DR IN RCRD: CPT | Mod: CPTII,S$GLB,,

## 2024-02-07 PROCEDURE — 87340 HEPATITIS B SURFACE AG IA: CPT

## 2024-02-07 PROCEDURE — 1159F MED LIST DOCD IN RCRD: CPT | Mod: CPTII,S$GLB,,

## 2024-02-07 PROCEDURE — 82306 VITAMIN D 25 HYDROXY: CPT

## 2024-02-07 PROCEDURE — 99215 OFFICE O/P EST HI 40 MIN: CPT | Mod: S$GLB,,,

## 2024-02-07 PROCEDURE — 99999 PR PBB SHADOW E&M-EST. PATIENT-LVL III: CPT | Mod: PBBFAC,,,

## 2024-02-07 PROCEDURE — 3074F SYST BP LT 130 MM HG: CPT | Mod: CPTII,S$GLB,,

## 2024-02-07 PROCEDURE — 3008F BODY MASS INDEX DOCD: CPT | Mod: CPTII,S$GLB,,

## 2024-02-07 PROCEDURE — 80053 COMPREHEN METABOLIC PANEL: CPT

## 2024-02-07 RX ORDER — MOXIFLOXACIN 5 MG/ML
SOLUTION/ DROPS OPHTHALMIC
COMMUNITY
Start: 2023-09-16

## 2024-02-07 RX ORDER — CLOTRIMAZOLE AND BETAMETHASONE DIPROPIONATE 10; .64 MG/G; MG/G
CREAM TOPICAL
COMMUNITY

## 2024-02-07 NOTE — PROGRESS NOTES
Patient ID: Thu Juares is a 48 y.o. female who presents today for a routine clinic visit for MS.  She was last seen by Dr. Kitchen on 8/9/2023.  The history was provided by the patient.     Principle neurological diagnosis: MS   Date of symptom onset: 8/2004  Date of diagnosis: 10/2004  Disease type at diagnosis: RR  Disease type currently: RR  Previous therapy: Betaserone 2004 - ?, Copaxone, back to Betaserone x10yrs, Tysabri 2014 - 2015 (reaction), Betaserone 2015 - 2018 (Failed)   Current therapy: Ocrevus 2018 - present   Vitamin D Dose: not really taking   Last MRI Brain: 2/4/2024 - stable  Last MRI C-spine: 2/4/2024 - stable   Last MRI T-spine: 2/1/2023 - stable   CSF: NA  JCV status: NA  Other relevant labs and tests: NA    Subjective:     She states that the increase of the baclofen has helped, but she fell in December, fell when trying to sit on rolling chair at work (dealing with worker's comp).  She hurt her back and it is causing her legs to feel tight and weak.     She reports that in December she had a UTI with a kidney stone.  She has not started the incontinence medication prescribed by her . She just times/plans urination. She does state that she has social anxiety due to her bathroom issues. The anxiety will then cause diarrhea.     She did have an issue yesterday that she could not initiate a swallow.  That was the first time that happened.     She denies sensory changes, dysphagia, dysarthria, spasticity, visual changes, cognitive changes, mood disorder, gait disturbance, incoordination, heat sensitivity.       SOCIAL HISTORY  Living arrangements - the patient lives with their family.  Social History     Socioeconomic History    Marital status:    Tobacco Use    Smoking status: Never    Smokeless tobacco: Former   Substance and Sexual Activity    Alcohol use: Not Currently    Drug use: Never    Sexual activity: Yes     Partners: Male       Current Outpatient Medications on File Prior to  Visit   Medication Sig Dispense Refill    atorvastatin (LIPITOR) 40 MG tablet Take 40 mg by mouth once daily.      baclofen (LIORESAL) 5 mg Tab tablet TAKE 1 TABLET (5 MG TOTAL) BY MOUTH NIGHTLY AS NEEDED (STIFFNESS AND MUSCLE CRAMPS). 90 tablet 1    clotrimazole-betamethasone 1-0.05% (LOTRISONE) cream APPLY 1 GRAM ON SKIN 3 TIMES DAILY      moxifloxacin (VIGAMOX) 0.5 % ophthalmic solution INSTILL 1 DROP INTO AFFECTED EYE(S) 3 TIMES A DAY      ocrelizumab (OCREVUS) 30 mg/mL Soln Every six months.      SYNTHROID 88 mcg tablet TAKE 1 TABLET BY MOUTH BEFORE BREAKFAST. 90 tablet 3    vibegron (GEMTESA) 75 mg Tab Take 75 mg by mouth once daily. 30 tablet 11     No current facility-administered medications on file prior to visit.       Objective:     1. 25 foot timed walk:      2/7/2024    12:01 AM   Timed 25 Foot Walk:   Did patient wear an AFO? No   Was assistive device used? No   Time for 25 Foot Walk (seconds) 6.3   Time for 25 Foot Walk (seconds) 6.8           NEURO EXAM    In general, the patient is well nourished and appears to be in no acute distress.    MENTAL STATUS: language is fluent, normal verbal comprehension, short-term and remote memory is intact, attention is normal, patient is alert and oriented x 3, fund of knowlege is appropriate by vocabulary.     CRANIAL NERVE EXAM:  There is no internuclear ophthalmoplegia.  Extraocular muscles are intact. No facial asymmetry. Facial sensation is decreased on left V1-V3. There is no dysarthria. Uvula is midline, and palate moves symmetrically. Shoulder shrug intact bilaterlly. Tongue protrusion is midline. Hearing is decreased on right to finger rub. Neck is supple with full ROM    MOTOR EXAM: Normal bulk and tone throughout UE and LE bilaterally.  Rapid sequential movements are normal; Strength is  5/5 in all groups in the lower extremities and upper extremities    SENSORY EXAM: light touch decreased on left throughout.    GAIT: slightly wide based and stable.  "      Imaging:     Personally reviewed and discussed with patient.     Results for orders placed during the hospital encounter of 02/04/24    MRI Brain Demyelinating Without Contrast    Impression  Stable white matter lesions intracranially consistent with the history of demyelinating disease.  No evidence of new lesion.      Electronically signed by: True Vuong  Date:    02/04/2024  Time:    12:40    Results for orders placed during the hospital encounter of 02/04/24    MRI Cervical Spine Demyelinating Without Contrast    Impression  Similar appearance of lesions within the cervical cord with no new demyelinating lesion or cord expansion.      Electronically signed by: True Vuong  Date:    02/04/2024  Time:    13:06    Results for orders placed during the hospital encounter of 02/01/23    MRI Thoracic Spine Demyelinating Without Contrast    Impression  Multiple short-segment T2 hyperintense lesions scattered throughout the spinal cord with the appearance consistent with reported history of multiple sclerosis.  No postcontrast imaging to assess for active disease.  Recommend correlation with prior imaging to assess stability.    Mild cervical spondylosis.    This report was flagged in Epic as abnormal.      Electronically signed by: Jordan Jacobson MD  Date:    02/02/2023  Time:    09:28      Labs:     Lab Results   Component Value Date    LHYKXTPO68TH 27 (L) 02/07/2024    DKCUBERD75NV 30 11/15/2016     No results found for: "JCVINDEX", "JCVANTIBODY"  No results found for: "KV6WHSLF", "ABSOLUTECD3", "OB6HMCNC", "ABSOLUTECD8", "UV1GDCEJ", "ABSOLUTECD4", "LABCD48"  Lab Results   Component Value Date    WBC 9.36 02/07/2024    RBC 4.54 02/07/2024    HGB 14.1 02/07/2024    HCT 41.7 02/07/2024    MCV 92 02/07/2024    MCH 31.1 (H) 02/07/2024    MCHC 33.8 02/07/2024    RDW 11.7 02/07/2024     02/07/2024    MPV 9.2 02/07/2024    GRAN 7.2 02/07/2024    GRAN 76.4 (H) 02/07/2024    LYMPH 1.3 02/07/2024    LYMPH " 13.8 (L) 02/07/2024    MONO 0.7 02/07/2024    MONO 7.8 02/07/2024    EOS 0.1 02/07/2024    BASO 0.06 02/07/2024    EOSINOPHIL 1.1 02/07/2024    BASOPHIL 0.6 02/07/2024     Sodium   Date Value Ref Range Status   02/07/2024 142 136 - 145 mmol/L Final     Potassium   Date Value Ref Range Status   02/07/2024 3.9 3.5 - 5.1 mmol/L Final     Chloride   Date Value Ref Range Status   02/07/2024 105 95 - 110 mmol/L Final     CO2   Date Value Ref Range Status   02/07/2024 30 (H) 23 - 29 mmol/L Final     Glucose   Date Value Ref Range Status   02/07/2024 90 70 - 110 mg/dL Final     BUN   Date Value Ref Range Status   02/07/2024 17 6 - 20 mg/dL Final     Creatinine   Date Value Ref Range Status   02/07/2024 0.8 0.5 - 1.4 mg/dL Final     Calcium   Date Value Ref Range Status   02/07/2024 9.7 8.7 - 10.5 mg/dL Final     Total Protein   Date Value Ref Range Status   02/07/2024 7.3 6.0 - 8.4 g/dL Final     Albumin   Date Value Ref Range Status   02/07/2024 4.2 3.5 - 5.2 g/dL Final     Total Bilirubin   Date Value Ref Range Status   02/07/2024 0.5 0.1 - 1.0 mg/dL Final     Comment:     For infants and newborns, interpretation of results should be based  on gestational age, weight and in agreement with clinical  observations.    Premature Infant recommended reference ranges:  Up to 24 hours.............<8.0 mg/dL  Up to 48 hours............<12.0 mg/dL  3-5 days..................<15.0 mg/dL  6-29 days.................<15.0 mg/dL       Alkaline Phosphatase   Date Value Ref Range Status   02/07/2024 77 55 - 135 U/L Final     AST   Date Value Ref Range Status   02/07/2024 19 10 - 40 U/L Final     ALT   Date Value Ref Range Status   02/07/2024 18 10 - 44 U/L Final     Anion Gap   Date Value Ref Range Status   02/07/2024 7 (L) 8 - 16 mmol/L Final     Lab Results   Component Value Date    HEPBSAG Non-reactive 02/07/2024    HEPBSAB <3.00 02/01/2023    HEPBSAB Non-reactive 02/01/2023    HEPBCAB Non-reactive 02/07/2024           MS Impression  and Plan:     NEURO MULTIPLE SCLEROSIS IMPRESSION:   Number of relapses in the past year?:  0  Clinical Progression:  Clinically Stable  MRI Progression:  Stable  MS Classification:  Relapsing-Remitting MS  DMT:  No change in management  DMT comment:  Continue Ocrevus.  She is aware of the risks associated with immunosuppressant therapy, including increased risk of infection.     Supplements:  Vit D (will check vitamin D level today and follow up accordingly with pt)     Discussed with patient about starting bladder medication that was prescribed by her urologist.   Advised pt since she is feeling leg stiffness from her back injury, she can try to take the baclofen during the day, but to monitor for increased weakness and drowsiness.   Labs today   MRIs in 1 year  Follow up with Dr. Elizabeth in 6 months  Plan discussed and questions were answered to satisfaction.     Problem List Items Addressed This Visit          Neuro    Multiple sclerosis - Primary    Relevant Orders    CBC Auto Differential (Completed)    Comprehensive Metabolic Panel (Completed)    Hepatitis B Core Antibody, Total (Completed)    Hepatitis B Surface Antigen (Completed)    Immunoglobulins (IgG, IgA, IgM) Quantitative (Completed)     Other Visit Diagnoses       Vitamin D deficiency                I spent a total of 65 minutes on the day of the visit.This includes face to face time and non-face to face time preparing to see the patient (eg, review of tests), obtaining and/or reviewing separately obtained history, documenting clinical information in the electronic or other health record, independently interpreting results and communicating results to the patient/family/caregiver, or care coordinator.       DEV Santos

## 2024-02-08 ENCOUNTER — DOCUMENTATION ONLY (OUTPATIENT)
Dept: NEUROLOGY | Facility: CLINIC | Age: 49
End: 2024-02-08
Payer: COMMERCIAL

## 2024-02-08 LAB — 25(OH)D3+25(OH)D2 SERPL-MCNC: 27 NG/ML (ref 30–96)

## 2024-02-08 RX ORDER — ERGOCALCIFEROL 1.25 MG/1
50000 CAPSULE ORAL
Qty: 12 CAPSULE | Refills: 3 | Status: SHIPPED | OUTPATIENT
Start: 2024-02-08 | End: 2025-02-07

## 2024-04-21 DIAGNOSIS — R15.9 BOWEL AND BLADDER INCONTINENCE: ICD-10-CM

## 2024-04-21 DIAGNOSIS — R32 BOWEL AND BLADDER INCONTINENCE: ICD-10-CM

## 2024-04-21 DIAGNOSIS — N39.41 URGE INCONTINENCE OF URINE: ICD-10-CM

## 2024-04-21 DIAGNOSIS — G35 MULTIPLE SCLEROSIS: ICD-10-CM

## 2024-04-23 DIAGNOSIS — E89.0 POST-SURGICAL HYPOTHYROIDISM: ICD-10-CM

## 2024-04-23 RX ORDER — BACLOFEN 5 MG/1
5 TABLET ORAL NIGHTLY PRN
Qty: 90 TABLET | Refills: 1 | Status: SHIPPED | OUTPATIENT
Start: 2024-04-23

## 2024-04-24 RX ORDER — LEVOTHYROXINE SODIUM 88 UG/1
88 TABLET ORAL
Qty: 90 TABLET | Refills: 1 | Status: SHIPPED | OUTPATIENT
Start: 2024-04-24

## 2024-05-02 ENCOUNTER — PATIENT MESSAGE (OUTPATIENT)
Dept: PSYCHIATRY | Facility: CLINIC | Age: 49
End: 2024-05-02
Payer: COMMERCIAL

## 2024-05-03 ENCOUNTER — PATIENT MESSAGE (OUTPATIENT)
Dept: NEUROLOGY | Facility: CLINIC | Age: 49
End: 2024-05-03
Payer: COMMERCIAL

## 2024-06-24 ENCOUNTER — PATIENT MESSAGE (OUTPATIENT)
Dept: UROGYNECOLOGY | Facility: CLINIC | Age: 49
End: 2024-06-24

## 2024-06-24 ENCOUNTER — E-VISIT (OUTPATIENT)
Dept: UROGYNECOLOGY | Facility: CLINIC | Age: 49
End: 2024-06-24
Payer: COMMERCIAL

## 2024-06-24 DIAGNOSIS — R10.9 FLANK PAIN: Primary | ICD-10-CM

## 2024-06-24 DIAGNOSIS — R82.90 URINE ABNORMALITY: ICD-10-CM

## 2024-06-24 NOTE — PROGRESS NOTES
Patient ID: Thu Juares is a 49 y.o. female.    Chief Complaint: Flank Pain and urine abnormality    The patient initiated a request through [employer] on 6/24/2024 for evaluation and management with a chief complaint of Flank Pain and urine abnormality     I evaluated the questionnaire submission on 06/24/2024  I requested the patient bring a urine specimen to the lab.    [facility] Peq Evisit [facility]    6/24/2024 12:05 PM CDT - Filed by Patient   Do you agree to participate in an E-Visit? Yes   If you have any of the following symptoms, please present to your local emergency room or call 911:  I acknowledge   Are you pregnant, could you be pregnant, or are you breast feeding? None of the above   What is the main issue you would like addressed today? My urine is orange in color a d my back hurts   Please describe your symptoms Back pain and orange/red color urine   Where is your problem located? Bladder   How severe are your symptoms? Moderate   Have you had these symptoms before? No   How long have you been having these symptoms? Just today   Please list any medications or treatments you have used for your condition and indicate if it was effective or not.    What makes this feel better? I dont know   What makes this feel worse? I dibt know   Are these symptoms related to a condition that you currently have? No   Please describe any probable cause for these symptoms Not sure   Provide any additional information you feel is important.    Please attach any relevant images or files    Are you able to take your vital signs? No         Encounter Diagnoses   Name Primary?    Flank pain Yes    Urine abnormality         Orders Placed This Encounter   Procedures    Urine culture     Standing Status:   Future     Standing Expiration Date:   9/22/2025    Urinalysis     Standing Status:   Future     Standing Expiration Date:   8/23/2025     Order Specific Question:   Collection Type     Answer:   Urine, Clean Catch            No  follow-ups on file.      E-Visit Time Tracking:    Day 1 Time (in minutes): 5    Total Time (in minutes): 5

## 2024-06-25 ENCOUNTER — LAB VISIT (OUTPATIENT)
Dept: LAB | Facility: HOSPITAL | Age: 49
End: 2024-06-25
Attending: NURSE PRACTITIONER
Payer: COMMERCIAL

## 2024-06-25 DIAGNOSIS — R10.9 FLANK PAIN: ICD-10-CM

## 2024-06-25 DIAGNOSIS — R82.90 URINE ABNORMALITY: ICD-10-CM

## 2024-06-25 PROCEDURE — 87086 URINE CULTURE/COLONY COUNT: CPT | Performed by: NURSE PRACTITIONER

## 2024-06-25 PROCEDURE — 81003 URINALYSIS AUTO W/O SCOPE: CPT | Performed by: NURSE PRACTITIONER

## 2024-06-27 ENCOUNTER — PATIENT MESSAGE (OUTPATIENT)
Dept: UROGYNECOLOGY | Facility: CLINIC | Age: 49
End: 2024-06-27
Payer: COMMERCIAL

## 2024-06-27 LAB
BACTERIA UR CULT: NORMAL
BACTERIA UR CULT: NORMAL

## 2024-07-05 ENCOUNTER — TELEPHONE (OUTPATIENT)
Dept: NEUROLOGY | Facility: CLINIC | Age: 49
End: 2024-07-05
Payer: COMMERCIAL

## 2024-07-05 DIAGNOSIS — G35 MULTIPLE SCLEROSIS: Primary | ICD-10-CM

## 2024-07-08 ENCOUNTER — TELEPHONE (OUTPATIENT)
Dept: NEUROLOGY | Facility: CLINIC | Age: 49
End: 2024-07-08
Payer: COMMERCIAL

## 2024-07-08 NOTE — TELEPHONE ENCOUNTER
Called and spoke to pt to schedule labs. Pt asked labs to be done at [facility] in [place]. Told pt that I could fax the orders to [facility] and they will contact her to schedule.

## 2024-07-16 ENCOUNTER — PATIENT MESSAGE (OUTPATIENT)
Dept: NEUROLOGY | Facility: CLINIC | Age: 49
End: 2024-07-16
Payer: COMMERCIAL

## 2024-07-17 ENCOUNTER — TELEPHONE (OUTPATIENT)
Dept: NEUROLOGY | Facility: CLINIC | Age: 49
End: 2024-07-17
Payer: COMMERCIAL

## 2024-07-22 ENCOUNTER — LAB VISIT (OUTPATIENT)
Dept: LAB | Facility: HOSPITAL | Age: 49
End: 2024-07-22
Payer: COMMERCIAL

## 2024-07-22 ENCOUNTER — OFFICE VISIT (OUTPATIENT)
Dept: NEUROLOGY | Facility: CLINIC | Age: 49
End: 2024-07-22
Payer: COMMERCIAL

## 2024-07-22 VITALS
WEIGHT: 151.13 LBS | HEIGHT: 64 IN | DIASTOLIC BLOOD PRESSURE: 80 MMHG | BODY MASS INDEX: 25.8 KG/M2 | SYSTOLIC BLOOD PRESSURE: 115 MMHG | HEART RATE: 82 BPM

## 2024-07-22 DIAGNOSIS — N39.41 URGE INCONTINENCE OF URINE: ICD-10-CM

## 2024-07-22 DIAGNOSIS — R32 BOWEL AND BLADDER INCONTINENCE: ICD-10-CM

## 2024-07-22 DIAGNOSIS — G35 MULTIPLE SCLEROSIS: Primary | ICD-10-CM

## 2024-07-22 DIAGNOSIS — G35 MULTIPLE SCLEROSIS: ICD-10-CM

## 2024-07-22 DIAGNOSIS — R42 DIZZINESS: ICD-10-CM

## 2024-07-22 DIAGNOSIS — R15.9 BOWEL AND BLADDER INCONTINENCE: ICD-10-CM

## 2024-07-22 DIAGNOSIS — R26.81 GAIT INSTABILITY: ICD-10-CM

## 2024-07-22 LAB
ALBUMIN SERPL BCP-MCNC: 4 G/DL (ref 3.5–5.2)
ALP SERPL-CCNC: 70 U/L (ref 55–135)
ALT SERPL W/O P-5'-P-CCNC: 16 U/L (ref 10–44)
ANION GAP SERPL CALC-SCNC: 7 MMOL/L (ref 8–16)
AST SERPL-CCNC: 17 U/L (ref 10–40)
BASOPHILS # BLD AUTO: 0.05 K/UL (ref 0–0.2)
BASOPHILS NFR BLD: 0.5 % (ref 0–1.9)
BILIRUB SERPL-MCNC: 0.5 MG/DL (ref 0.1–1)
BUN SERPL-MCNC: 15 MG/DL (ref 6–20)
CALCIUM SERPL-MCNC: 9.5 MG/DL (ref 8.7–10.5)
CHLORIDE SERPL-SCNC: 107 MMOL/L (ref 95–110)
CO2 SERPL-SCNC: 26 MMOL/L (ref 23–29)
CREAT SERPL-MCNC: 0.7 MG/DL (ref 0.5–1.4)
DIFFERENTIAL METHOD BLD: ABNORMAL
EOSINOPHIL # BLD AUTO: 0.1 K/UL (ref 0–0.5)
EOSINOPHIL NFR BLD: 1 % (ref 0–8)
ERYTHROCYTE [DISTWIDTH] IN BLOOD BY AUTOMATED COUNT: 11.9 % (ref 11.5–14.5)
EST. GFR  (NO RACE VARIABLE): >60 ML/MIN/1.73 M^2
GLUCOSE SERPL-MCNC: 83 MG/DL (ref 70–110)
HBV CORE AB SERPL QL IA: NORMAL
HBV SURFACE AG SERPL QL IA: NORMAL
HCT VFR BLD AUTO: 43.2 % (ref 37–48.5)
HGB BLD-MCNC: 14.5 G/DL (ref 12–16)
IGA SERPL-MCNC: 145 MG/DL (ref 40–350)
IGG SERPL-MCNC: 1016 MG/DL (ref 650–1600)
IGM SERPL-MCNC: 34 MG/DL (ref 50–300)
IMM GRANULOCYTES # BLD AUTO: 0.04 K/UL (ref 0–0.04)
IMM GRANULOCYTES NFR BLD AUTO: 0.4 % (ref 0–0.5)
LYMPHOCYTES # BLD AUTO: 1.5 K/UL (ref 1–4.8)
LYMPHOCYTES NFR BLD: 14.8 % (ref 18–48)
MCH RBC QN AUTO: 31 PG (ref 27–31)
MCHC RBC AUTO-ENTMCNC: 33.6 G/DL (ref 32–36)
MCV RBC AUTO: 93 FL (ref 82–98)
MONOCYTES # BLD AUTO: 1 K/UL (ref 0.3–1)
MONOCYTES NFR BLD: 9.8 % (ref 4–15)
NEUTROPHILS # BLD AUTO: 7.5 K/UL (ref 1.8–7.7)
NEUTROPHILS NFR BLD: 73.5 % (ref 38–73)
NRBC BLD-RTO: 0 /100 WBC
PLATELET # BLD AUTO: 291 K/UL (ref 150–450)
PMV BLD AUTO: 9.2 FL (ref 9.2–12.9)
POTASSIUM SERPL-SCNC: 4.5 MMOL/L (ref 3.5–5.1)
PROT SERPL-MCNC: 7.1 G/DL (ref 6–8.4)
RBC # BLD AUTO: 4.67 M/UL (ref 4–5.4)
SODIUM SERPL-SCNC: 140 MMOL/L (ref 136–145)
WBC # BLD AUTO: 10.21 K/UL (ref 3.9–12.7)

## 2024-07-22 PROCEDURE — 3074F SYST BP LT 130 MM HG: CPT | Mod: CPTII,S$GLB,,

## 2024-07-22 PROCEDURE — 86704 HEP B CORE ANTIBODY TOTAL: CPT

## 2024-07-22 PROCEDURE — G2211 COMPLEX E/M VISIT ADD ON: HCPCS | Mod: S$GLB,,,

## 2024-07-22 PROCEDURE — 85025 COMPLETE CBC W/AUTO DIFF WBC: CPT

## 2024-07-22 PROCEDURE — 3008F BODY MASS INDEX DOCD: CPT | Mod: CPTII,S$GLB,,

## 2024-07-22 PROCEDURE — 1159F MED LIST DOCD IN RCRD: CPT | Mod: CPTII,S$GLB,,

## 2024-07-22 PROCEDURE — 36415 COLL VENOUS BLD VENIPUNCTURE: CPT

## 2024-07-22 PROCEDURE — 80053 COMPREHEN METABOLIC PANEL: CPT

## 2024-07-22 PROCEDURE — 99999 PR PBB SHADOW E&M-EST. PATIENT-LVL V: CPT | Mod: PBBFAC,,,

## 2024-07-22 PROCEDURE — 99215 OFFICE O/P EST HI 40 MIN: CPT | Mod: S$GLB,,,

## 2024-07-22 PROCEDURE — 3079F DIAST BP 80-89 MM HG: CPT | Mod: CPTII,S$GLB,,

## 2024-07-22 PROCEDURE — 1160F RVW MEDS BY RX/DR IN RCRD: CPT | Mod: CPTII,S$GLB,,

## 2024-07-22 PROCEDURE — 82784 ASSAY IGA/IGD/IGG/IGM EACH: CPT | Mod: 59

## 2024-07-22 PROCEDURE — 87340 HEPATITIS B SURFACE AG IA: CPT

## 2024-07-22 RX ORDER — MECLIZINE HCL 12.5 MG 12.5 MG/1
12.5 TABLET ORAL 3 TIMES DAILY PRN
Qty: 90 TABLET | Refills: 5 | Status: SHIPPED | OUTPATIENT
Start: 2024-07-22

## 2024-07-22 RX ORDER — ERGOCALCIFEROL 1.25 MG/1
50000 CAPSULE ORAL
COMMUNITY

## 2024-07-22 RX ORDER — BACLOFEN 5 MG/1
TABLET ORAL
Qty: 360 TABLET | Refills: 1 | Status: SHIPPED | OUTPATIENT
Start: 2024-07-22

## 2024-07-22 NOTE — PATIENT INSTRUCTIONS
Magnesium glycinate 400mg nightly for sleep and spasms    CoQ10 400mg daily for fatigue     Make an appt with urology

## 2024-07-22 NOTE — PROGRESS NOTES
Patient ID: Thu Juares is a 49 y.o. female who presents today for a fit-in clinic visit for MS and dizziness.  She was last seen on 2/7/2024.  The history was provided by the patient.     Principle neurological diagnosis: MS   Date of symptom onset: 8/2004  Date of diagnosis: 10/2004  Disease type at diagnosis: RR  Disease type currently: RR  Previous therapy: Betaserone 2004 - ?, Copaxone, back to Betaserone x10yrs, Tysabri 2014 - 2015 (reaction), Betaserone 2015 - 2018 (Failed)   Current therapy: Ocrevus 2018 - present   Vitamin D Dose: not really taking   Last MRI Brain: 2/4/2024 - stable  Last MRI C-spine: 2/4/2024 - stable   Last MRI T-spine: 2/1/2023 - stable   CSF: NA  JCV status: NA  Other relevant labs and tests: NA     Subjective:     She reports today to discuss a current symptom - dizziness.  This is not a new symptoms for her, but it has been effecting her more recently.     She started with dizziness last Friday.  She was taking a shower, while washing her hair with her eyes closed, she felt like she was going to pass out and the room was spinning. She had to get down on all fours because she felt like she was going to fall.  She stated it was a mix of lightheadedness and dizziness.     She feels dizzy when walking if she turns her head but mainly is off balanced when her eyes are closed. She feels better when she is lying down.       She has tried vestibular therapy in the past. The very last time it did not help and they told her it wasn't her crystals in her ears that was causing the dizziness.      She said her  tried the Epley maneuver on her and it did not help with the symptoms.       She feels tired and her muscles are tight.  She is taking 10mg of baclofen nightly but none during the day         ROS:      7/21/2024    12:04 PM   REVIEW OF SYMPTOMS   Do you feel abnormally tired on most days? Yes   Do you feel you generally sleep well? No   Do you have difficulty controlling your  bladder?  Yes - seeing urology - started pelvic floor therapy but then had a UTI and kidney stone and stopped going   Do you have difficulty controlling your bowels?  Yes - she has had accidents, causing a great deal of anxiety.    Do you have frequent muscle cramps, tightness or spasms in your limbs?  Yes   Do you have new visual symptoms?  No   Do you have worsening difficulty with your memory or thinking? Yes - about the same, not problematic    Do you have worsening symptoms of anxiety or depression?  Yes - anxiety and irritability    For patients who walk, Do you have more difficulty walking?  No   Have you fallen since your last visit?  Yes - dizziness most recently, one in June tripped, foot caught on mat - hurt her knees    For patients who use wheelchairs: Do you have any skin wounds or breakdown? Not Applicable   Do you have difficulty using your hands?  No   Do you have shooting or burning pain? No   Do you have difficulty with sexual function?  Yes   If you are sexually active, are you using birth control? Y/N  N/A No   Do you often choke when swallowing liquids or solid food?  No   Do you experience worsening symptoms when overheated? Yes   Do you need any new equipment such as a wheelchair, walker or shower chair? No   Do you receive co-pay financial assistance for your principal MS medicine? Yes   Would you be interested in participating in an MS research trial in the future? Yes   For patients on Gilenya, Tecfidera, Aubagio, Rituxan, Ocrevus, Tysabri, Lemtrada or Methotrexate, are you aware that you should NOT receive live virus vaccines?  Yes   Do you feel you have adequate family/friend support?  Yes   Do you have health insurance?   Yes   Are you currently employed? No - retired in June    Do you receive SSDI/SSI?  No   Do you use marijuana or cannabis products? No   Have you been diagnosed with a urinary tract infection since your last visit here? Yes   Have you been diagnosed with a respiratory  tract infection since your last visit here? Yes   Have you been to the emergency room since your last visit here? Yes - kidney stone in December    Have you been hospitalized since your last visit here?  No            7/21/2024    12:10 PM   FSS SCORE & INTERPRETATION   FSS SCORE  42   FSS SCORE INTERPRETATION May be suffering from fatigue         7/21/2024    12:08 PM   MS WILLIAMS-D SCORE & INTERPRETATION   WILLIAMS-D SCORE  21   WILLIAMS-D INTERPRETATION  Mild to moderate Depression         7/21/2024    12:06 PM   MS TR-7 SCORE & INTERPRETATION   TR-7 SCORE  8   TR-7 SCORE INTERPRETATION Mild Anxiety         7/21/2024    12:11 PM   PEQ MS MOS PAIN EFFECTS SCORE & INTERPRETATION   PES SCORE 11   PES SCORE INTERPRETATION Scores can range from 6-30.  Items are scaled so that higher scores indicate a greater impact of pain on a patients mood and behavior.         7/21/2024    12:13 PM   PEQ MS SEXUAL SATISFACTION SCORE & INTERPRETATION   SSS SCORE  9   SSS SCORE INTERPRETATION Scores can range from 4-24.  Higher scores indicate greater problems with sexual satisfaction.         7/21/2024    12:14 PM   MS BLADDER CONTROL SCORE & INTERPRETATION   BLCS SCORE 8   BLCS SCORE INTERPRETATION  Scores can range from 0-22, with higher scores indicating greater bladder control problems.         7/21/2024    12:20 PM   MS BOWEL CONTROL SCORE & INTERPRETATION   BWCS SCORE 18   BWCS SCORE INTERPRETATION Scores can range from 0-26, with higher scores indicating greater bowel control problems.         7/21/2024    12:15 PM   PEQ MS IMPACT OF VISUAL IMPAIRMENT SCORE & INTERPRETATION   AYDEE SCALE SCORE  0   AYDEE SCORE INTERPRETATION Scores can range from 0-15, with higher scores indicating greater impact of visual problems on daily activites.         7/21/2024    12:17 PM   MS PDQ SCORE & INTERPRETATION   PDQ RETROSPECTIVE MEMORY SUBSCALE 7   PDQ ATTENTION/CONCENTRATION SUBSCALE 7   PDQ PROSPECTIVE MEMORY SUBSCALE 3   PDQ PLANNING/ORGANIZATION  SUBSCALE 5   PDQ TOTAL SCORE 22   PDQ SCORE INTERPRETATION Scores can range from 0-80, with higher scores indicating greater perceived cognitive impairment.         7/21/2024    12:22 PM   MSSS SCORE & INTERPRETATION   MSSS TANGIBLE SUPPORT SUBSCALE 81.25   MSSS EMOTIONAL/INFORMATIONAL SUPPORT SUBSCALE 100   MSSS AFFECTIONATE SUPPORT SUBSCALE 100   MSSS POSITIVE SOCIAL INTERACTION SUBSCALE 100   MSSS TOTAL SCORE 95.31   MSSS SCORE INTERPRETATION Scores can range from 0-100, with higher scores indicating greater perceived support.        SOCIAL HISTORY  Living arrangements - the patient lives with their family.  Social History     Socioeconomic History    Marital status:    Tobacco Use    Smoking status: Never    Smokeless tobacco: Former   Substance and Sexual Activity    Alcohol use: Not Currently    Drug use: Never    Sexual activity: Yes     Partners: Male     Social Determinants of Health     Financial Resource Strain: Low Risk  (7/21/2024)    Overall Financial Resource Strain (CARDIA)     Difficulty of Paying Living Expenses: Not hard at all   Food Insecurity: No Food Insecurity (7/21/2024)    Hunger Vital Sign     Worried About Running Out of Food in the Last Year: Never true     Ran Out of Food in the Last Year: Never true   Physical Activity: Inactive (7/21/2024)    Exercise Vital Sign     Days of Exercise per Week: 0 days     Minutes of Exercise per Session: 0 min   Stress: No Stress Concern Present (7/21/2024)    [employer] [place] [employer] - Occupational Stress Questionnaire     Feeling of Stress : Only a little   Housing Stability: Unknown (7/21/2024)    Housing Stability Vital Sign     Unable to Pay for Housing in the Last Year: No       Current Outpatient Medications on File Prior to Visit   Medication Sig Dispense Refill    atorvastatin (LIPITOR) 40 MG tablet Take 40 mg by mouth once daily.      ergocalciferol (VITAMIN D2) 50,000 unit Cap Take 50,000 Units by mouth every 7 days.       ocrelizumab (OCREVUS) 30 mg/mL Soln Every six months.      SYNTHROID 88 mcg tablet Take 1 tablet (88 mcg total) by mouth before breakfast. 90 tablet 1     No current facility-administered medications on file prior to visit.       Objective:     1. 25 foot timed walk:      2/7/2024    12:01 AM 7/22/2024    12:01 AM   Timed 25 Foot Walk:   Did patient wear an AFO? No No   Was assistive device used? No No   Time for 25 Foot Walk (seconds) 6.3 5.83   Time for 25 Foot Walk (seconds) 6.8 6           NEURO EXAM    In general, the patient is well nourished and appears to be in no acute distress.    MENTAL STATUS: language is fluent, normal verbal comprehension, short-term and remote memory is intact, attention is normal, patient is alert and oriented x 3, fund of knowlege is appropriate by vocabulary.     GAIT: Wide based and slightly ataxic     Positive Romberg's - even with feet slightly spread.     Imaging:     Personally reviewed.     Results for orders placed during the hospital encounter of 02/04/24    MRI Brain Demyelinating Without Contrast    Impression  Stable white matter lesions intracranially consistent with the history of demyelinating disease.  No evidence of new lesion.      Electronically signed by: True Vuong  Date:    02/04/2024  Time:    12:40    Results for orders placed during the hospital encounter of 02/04/24    MRI Cervical Spine Demyelinating Without Contrast    Impression  Similar appearance of lesions within the cervical cord with no new demyelinating lesion or cord expansion.      Electronically signed by: True Vuong  Date:    02/04/2024  Time:    13:06    Results for orders placed during the hospital encounter of 02/01/23    MRI Thoracic Spine Demyelinating Without Contrast    Impression  Multiple short-segment T2 hyperintense lesions scattered throughout the spinal cord with the appearance consistent with reported history of multiple sclerosis.  No postcontrast imaging to assess for  "active disease.  Recommend correlation with prior imaging to assess stability.    Mild cervical spondylosis.    This report was flagged in Epic as abnormal.      Electronically signed by: Jordan Jacobson MD  Date:    02/02/2023  Time:    09:28      Labs:     Lab Results   Component Value Date    GUBRMQOX39MM 27 (L) 02/07/2024    SFWOQFWV10TU 30 11/15/2016     No results found for: "JCVINDEX", "JCVANTIBODY"  No results found for: "XE3IEJGH", "ABSOLUTECD3", "TD0VFHQP", "ABSOLUTECD8", "DO0SOEGW", "ABSOLUTECD4", "LABCD48"  Lab Results   Component Value Date    WBC 10.21 07/22/2024    RBC 4.67 07/22/2024    HGB 14.5 07/22/2024    HCT 43.2 07/22/2024    MCV 93 07/22/2024    MCH 31.0 07/22/2024    MCHC 33.6 07/22/2024    RDW 11.9 07/22/2024     07/22/2024    MPV 9.2 07/22/2024    GRAN 7.5 07/22/2024    GRAN 73.5 (H) 07/22/2024    LYMPH 1.5 07/22/2024    LYMPH 14.8 (L) 07/22/2024    MONO 1.0 07/22/2024    MONO 9.8 07/22/2024    EOS 0.1 07/22/2024    BASO 0.05 07/22/2024    EOSINOPHIL 1.0 07/22/2024    BASOPHIL 0.5 07/22/2024     Sodium   Date Value Ref Range Status   07/22/2024 140 136 - 145 mmol/L Final     Potassium   Date Value Ref Range Status   07/22/2024 4.5 3.5 - 5.1 mmol/L Final     Chloride   Date Value Ref Range Status   07/22/2024 107 95 - 110 mmol/L Final     CO2   Date Value Ref Range Status   07/22/2024 26 23 - 29 mmol/L Final     Glucose   Date Value Ref Range Status   07/22/2024 83 70 - 110 mg/dL Final     BUN   Date Value Ref Range Status   07/22/2024 15 6 - 20 mg/dL Final     Creatinine   Date Value Ref Range Status   07/22/2024 0.7 0.5 - 1.4 mg/dL Final     Calcium   Date Value Ref Range Status   07/22/2024 9.5 8.7 - 10.5 mg/dL Final     Total Protein   Date Value Ref Range Status   07/22/2024 7.1 6.0 - 8.4 g/dL Final     Albumin   Date Value Ref Range Status   07/22/2024 4.0 3.5 - 5.2 g/dL Final     Total Bilirubin   Date Value Ref Range Status   07/22/2024 0.5 0.1 - 1.0 mg/dL Final     Comment:    "  For infants and newborns, interpretation of results should be based  on gestational age, weight and in agreement with clinical  observations.    Premature Infant recommended reference ranges:  Up to 24 hours.............<8.0 mg/dL  Up to 48 hours............<12.0 mg/dL  3-5 days..................<15.0 mg/dL  6-29 days.................<15.0 mg/dL       Alkaline Phosphatase   Date Value Ref Range Status   07/22/2024 70 55 - 135 U/L Final     AST   Date Value Ref Range Status   07/22/2024 17 10 - 40 U/L Final     ALT   Date Value Ref Range Status   07/22/2024 16 10 - 44 U/L Final     Anion Gap   Date Value Ref Range Status   07/22/2024 7 (L) 8 - 16 mmol/L Final     Lab Results   Component Value Date    HEPBSAG Non-reactive 07/22/2024    HEPBSAB <3.00 02/01/2023    HEPBSAB Non-reactive 02/01/2023    HEPBCAB Non-reactive 07/22/2024           MS Impression and Plan:     NEURO MULTIPLE SCLEROSIS IMPRESSION:   Number of relapses in the past year?:  0  MRI Progression:  Stable  MS Classification:  Relapsing-Remitting MS  DMT:  No change in management  DMT comment:  Continue Ocrevus - safety labs today.  She is aware of the risks associated with immunosuppressant therapy, including increased risk of infection.   Symptom Management:  Implement change in symptom management  Implement Change in Symptom Management:  Bowel, Bladder, Gait and Sleep     Will refer to PT for balance and strengthening and meclizine to help with the dizziness.  Will also refer to pelvic floor therapy for urinary symptoms and have patient follow up with urology.  Will refer to GI for bowel urgency and incontinence. Patient to start nightly magnesium and daily CoQ10 for sleep and fatigue.   Follow up as scheduled with Dr. Elizabeth in August.   Plan discussed and questions were answered to satisfaction.     Problem List Items Addressed This Visit          Neuro    Multiple sclerosis - Primary    Relevant Medications    baclofen (LIORESAL) 5 mg Tab tablet     meclizine (ANTIVERT) 12.5 mg tablet    Other Relevant Orders    Ambulatory referral/consult to Physical/Occupational Therapy    Ambulatory referral/consult to Physical/Occupational Therapy    Ambulatory referral/consult to Gastroenterology    CBC Auto Differential (Completed)    Comprehensive Metabolic Panel (Completed)    Hepatitis B Core Antibody, Total (Completed)    Hepatitis B Surface Antigen (Completed)    Immunoglobulins (IgG, IgA, IgM) Quantitative (Completed)       Other    Dizziness    Relevant Medications    meclizine (ANTIVERT) 12.5 mg tablet    Other Relevant Orders    Ambulatory referral/consult to Physical/Occupational Therapy     Other Visit Diagnoses       Urge incontinence of urine        Relevant Medications    baclofen (LIORESAL) 5 mg Tab tablet    Other Relevant Orders    Ambulatory referral/consult to Physical/Occupational Therapy    Bowel and bladder incontinence        Relevant Medications    baclofen (LIORESAL) 5 mg Tab tablet    Other Relevant Orders    Ambulatory referral/consult to Physical/Occupational Therapy    Ambulatory referral/consult to Gastroenterology    Gait instability        Relevant Orders    Ambulatory referral/consult to Physical/Occupational Therapy            I spent a total of 47 minutes on the day of the visit.This includes face to face time and non-face to face time preparing to see the patient (eg, review of tests), obtaining and/or reviewing separately obtained history, documenting clinical information in the electronic or other health record, independently interpreting results and communicating results to the patient/family/caregiver, or care coordinator.       DEV Santos

## 2024-07-24 ENCOUNTER — PATIENT MESSAGE (OUTPATIENT)
Dept: NEUROLOGY | Facility: CLINIC | Age: 49
End: 2024-07-24
Payer: COMMERCIAL

## 2024-07-25 ENCOUNTER — PATIENT MESSAGE (OUTPATIENT)
Dept: PSYCHIATRY | Facility: CLINIC | Age: 49
End: 2024-07-25
Payer: COMMERCIAL

## 2024-07-25 ENCOUNTER — DOCUMENTATION ONLY (OUTPATIENT)
Dept: NEUROLOGY | Facility: CLINIC | Age: 49
End: 2024-07-25
Payer: COMMERCIAL

## 2024-08-05 ENCOUNTER — PATIENT MESSAGE (OUTPATIENT)
Dept: PSYCHIATRY | Facility: CLINIC | Age: 49
End: 2024-08-05
Payer: COMMERCIAL

## 2024-08-07 ENCOUNTER — TELEPHONE (OUTPATIENT)
Dept: NEUROLOGY | Facility: CLINIC | Age: 49
End: 2024-08-07

## 2024-08-07 NOTE — TELEPHONE ENCOUNTER
----- Message from Tami Pichardo NP sent at 8/7/2024  9:03 AM CDT -----  Labs stable, ok to schedule infusion.  Can send to me to sign TP if needed

## 2024-08-12 ENCOUNTER — OFFICE VISIT (OUTPATIENT)
Dept: ENDOCRINOLOGY | Facility: CLINIC | Age: 49
End: 2024-08-12
Payer: COMMERCIAL

## 2024-08-12 DIAGNOSIS — E89.0 POST-SURGICAL HYPOTHYROIDISM: Primary | ICD-10-CM

## 2024-08-12 PROCEDURE — 99213 OFFICE O/P EST LOW 20 MIN: CPT | Mod: 95,,, | Performed by: INTERNAL MEDICINE

## 2024-08-12 PROCEDURE — 1159F MED LIST DOCD IN RCRD: CPT | Mod: CPTII,95,, | Performed by: INTERNAL MEDICINE

## 2024-08-12 NOTE — PROGRESS NOTES
"Audiovisual Virtual Visit New Patient    Subjective:      Chief Complaint: Follow-up      HPI: Thu Juares is a 49 y.o. female who is seen for an initial evaluation via audiovisual telemedicine for evaluation of hypothyroidism s/p thyroidectomy due to bilateral thyroid nodules in 2010 at [facility]. Pathology was benign.     Denies new medications or medical problems.   She is currently on brand name Synthroid 88 mcg daily, since 11/2016  Does not miss any doses. Takes thyroid hormone 2 hrs after breakfast and separate from other medications or supplements.   Weight is 151 lbs    Energy level is low, sleep is not always good. Appetite, BMs are normal.      Continues to take Ocrevus infusion twice yearly for her MS.      Menses regular occurring every 28 days  Seasonal allergies. She has active history of MS. (Dr. Mcgrath in )     Supplements:  Vit D borderline normal/insufficiency - takes vit d 16872 IU weekly     Reviewed past medical, family, social history and updated as appropriate.    Review of Systems    Denies recent illness    Objective:     BP Readings from Last 5 Encounters:   07/22/24 115/80   02/07/24 124/85   01/17/23 127/65   08/17/22 106/70   08/04/21 107/76       Physical exam was not performed and vitals were not obtained due to this being a telemedicine (virtual) visit.    Wt Readings from Last 10 Encounters:   07/22/24 0834 68.6 kg (151 lb 2 oz)   02/07/24 1041 69.6 kg (153 lb 7 oz)   01/18/24 1044 68.9 kg (152 lb)   01/17/23 0816 67 kg (147 lb 11.3 oz)   08/17/22 1337 64.8 kg (142 lb 13.7 oz)   08/04/21 1421 66.4 kg (146 lb 6.2 oz)   01/23/19 1008 71 kg (156 lb 8.4 oz)   01/16/18 0949 66.4 kg (146 lb 6.2 oz)   11/15/16 1029 65.2 kg (143 lb 11.8 oz)   11/03/15 1333 64.8 kg (142 lb 13.7 oz)       No results found for: "HGBA1C"  Lab Results   Component Value Date    CHOL 197 04/20/2015    HDL 39 (L) 04/20/2015    LDLCALC 108.0 04/20/2015    TRIG 250 (H) 04/20/2015    CHOLHDL 19.8 (L) " "04/20/2015     Lab Results   Component Value Date     07/22/2024    K 4.5 07/22/2024     07/22/2024    CO2 26 07/22/2024    GLU 83 07/22/2024    BUN 15 07/22/2024    CREATININE 0.7 07/22/2024    CALCIUM 9.5 07/22/2024    PROT 7.1 07/22/2024    ALBUMIN 4.0 07/22/2024    BILITOT 0.5 07/22/2024    ALKPHOS 70 07/22/2024    AST 17 07/22/2024    ALT 16 07/22/2024    ANIONGAP 7 (L) 07/22/2024    TSH 2.265 11/14/2022      No results found for: "LABMICR", "CREATRANDUR", "MICALBCREAT"    Assessment/Plan:     Post-surgical hypothyroidism  Clinically euthyroid   Had [facility] at [facility] three days ago.   Will await test before refilling medication in case we need to make an adjustment      The patient location is: home  The chief complaint leading to consultation is: post surgi hypo  Visit type: Virtual visit with synchronous audio and video  Total time spent with patient: 8 min    RTC in 12 months      Rosalva Vieyra MD    "

## 2024-08-12 NOTE — ASSESSMENT & PLAN NOTE
Clinically euthyroid   Had TSH at [facility] three days ago.   Will await test before refilling medication in case we need to make an adjustment

## 2024-08-15 ENCOUNTER — OFFICE VISIT (OUTPATIENT)
Dept: NEUROLOGY | Facility: CLINIC | Age: 49
End: 2024-08-15
Payer: COMMERCIAL

## 2024-08-15 VITALS
SYSTOLIC BLOOD PRESSURE: 119 MMHG | WEIGHT: 150.81 LBS | DIASTOLIC BLOOD PRESSURE: 83 MMHG | BODY MASS INDEX: 25.75 KG/M2 | HEART RATE: 86 BPM | HEIGHT: 64 IN

## 2024-08-15 DIAGNOSIS — G35 MULTIPLE SCLEROSIS: Primary | ICD-10-CM

## 2024-08-15 PROCEDURE — 99999 PR PBB SHADOW E&M-EST. PATIENT-LVL IV: CPT | Mod: PBBFAC,,, | Performed by: STUDENT IN AN ORGANIZED HEALTH CARE EDUCATION/TRAINING PROGRAM

## 2024-08-15 PROCEDURE — 3079F DIAST BP 80-89 MM HG: CPT | Mod: CPTII,S$GLB,, | Performed by: STUDENT IN AN ORGANIZED HEALTH CARE EDUCATION/TRAINING PROGRAM

## 2024-08-15 PROCEDURE — 3008F BODY MASS INDEX DOCD: CPT | Mod: CPTII,S$GLB,, | Performed by: STUDENT IN AN ORGANIZED HEALTH CARE EDUCATION/TRAINING PROGRAM

## 2024-08-15 PROCEDURE — 3074F SYST BP LT 130 MM HG: CPT | Mod: CPTII,S$GLB,, | Performed by: STUDENT IN AN ORGANIZED HEALTH CARE EDUCATION/TRAINING PROGRAM

## 2024-08-15 PROCEDURE — G2211 COMPLEX E/M VISIT ADD ON: HCPCS | Mod: S$GLB,,, | Performed by: STUDENT IN AN ORGANIZED HEALTH CARE EDUCATION/TRAINING PROGRAM

## 2024-08-15 PROCEDURE — 99215 OFFICE O/P EST HI 40 MIN: CPT | Mod: S$GLB,,, | Performed by: STUDENT IN AN ORGANIZED HEALTH CARE EDUCATION/TRAINING PROGRAM

## 2024-08-15 PROCEDURE — 1159F MED LIST DOCD IN RCRD: CPT | Mod: CPTII,S$GLB,, | Performed by: STUDENT IN AN ORGANIZED HEALTH CARE EDUCATION/TRAINING PROGRAM

## 2024-08-15 NOTE — PROGRESS NOTES
[facility]  Follow Up Patient Visit      Disease Summary     Principle neurological diagnosis: MS   Date of symptom onset: 8/2004  Date of diagnosis: 10/2004  Disease type at diagnosis: RR  Disease type currently: RR  Previous therapy: Betaserone 2004 - ?, Copaxone, back to Betaserone x10yrs, Tysabri 2014 - 2015 (reaction), Betaserone 2015 - 2018 (Failed)   Current therapy: Ocrevus 2018 - present   Vitamin D Dose: not really taking   Last MRI Brain: 2/4/2024 - stable  Last MRI C-spine: 2/4/2024 - stable   Last MRI T-spine: 2/1/2023 - stable   CSF: NA  JCV status: NA  Other relevant labs and tests: NA   Vit D:   Lab Results   Component Value Date    [id number] 27 (L) 02/07/2024    KHFREGVZ36IA 30 11/15/2016       Interval history:     She just started vestibular therapy 2 weeks ago and found it helpful.  Meclizine also helps with dizziness but it made her sleepy.  She's pending pelvic floor therapy next week. She also has decreased sensation as part of sexual dysfunction.     She is tolerating Ocrevus well, no SEs. 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 She's pending workup with for hormonal imbalance, she is experiencing agitation and anxiety.     She is taking magnesium at night and find it helpful with her leg cramps, and she takes CoQ10 for fatigue.     ROS:     SOCIAL HISTORY  Living arrangements - the patient lives with their family.  Social History     Socioeconomic History    Marital status:    Tobacco Use    Smoking status: Never    Smokeless tobacco: Former   Substance and Sexual Activity    Alcohol use: Not Currently    Drug use: Never    Sexual activity: Yes     Partners: Male     Social Determinants of Health     Financial Resource Strain: Low Risk  (7/21/2024)    Overall Financial Resource Strain (CARDIA)     Difficulty of Paying Living Expenses: Not hard at all   Food Insecurity: No Food  Insecurity (7/21/2024)    Hunger Vital Sign     Worried About Running Out of Food in the Last Year: Never true     Ran Out of Food in the Last Year: Never true   Physical Activity: Inactive (7/21/2024)    Exercise Vital Sign     Days of Exercise per Week: 0 days     Minutes of Exercise per Session: 0 min   Stress: No Stress Concern Present (7/21/2024)    [place] [employer] - Occupational Stress Questionnaire     Feeling of Stress : Only a little   Housing Stability: Unknown (7/21/2024)    Housing Stability Vital Sign     Unable to Pay for Housing in the Last Year: No       Patient Employment       Status   Retired                   7/21/2024    12:04 PM   REVIEW OF SYMPTOMS   Do you feel abnormally tired on most days? Yes   Do you feel you generally sleep well? No   Do you have difficulty controlling your bladder?  Yes   Do you have difficulty controlling your bowels?  Yes   Do you have frequent muscle cramps, tightness or spasms in your limbs?  Yes   Do you have new visual symptoms?  No   Do you have worsening difficulty with your memory or thinking? Yes   Do you have worsening symptoms of anxiety or depression?  Yes   For patients who walk, Do you have more difficulty walking?  No   Have you fallen since your last visit?  Yes   For patients who use wheelchairs: Do you have any skin wounds or breakdown? Not Applicable   Do you have difficulty using your hands?  No   Do you have shooting or burning pain? No   Do you have difficulty with sexual function?  Yes   If you are sexually active, are you using birth control? Y/N  N/A No   Do you often choke when swallowing liquids or solid food?  No   Do you experience worsening symptoms when overheated? Yes   Do you need any new equipment such as a wheelchair, walker or shower chair? No   Do you receive co-pay financial assistance for your principal MS medicine? Yes   Would you be interested in participating in an MS research trial in the future? Yes    For patients on Gilenya, Tecfidera, Aubagio, Rituxan, Ocrevus, Tysabri, Lemtrada or Methotrexate, are you aware that you should NOT receive live virus vaccines?  Yes   Do you feel you have adequate family/friend support?  Yes   Do you have health insurance?   Yes   Are you currently employed? No   Do you receive SSDI/SSI?  No   Do you use marijuana or cannabis products? No   Have you been diagnosed with a urinary tract infection since your last visit here? Yes   Have you been diagnosed with a respiratory tract infection since your last visit here? Yes   Have you been to the emergency room since your last visit here? No   Have you been hospitalized since your last visit here?  No         7/21/2024    12:10 PM   FSS SCORE & INTERPRETATION   FSS SCORE  42   FSS SCORE INTERPRETATION May be suffering from fatigue         7/21/2024    12:08 PM   MS WILLIAMS-D SCORE & INTERPRETATION   WILLIAMS-D SCORE  21   WILLIAMS-D INTERPRETATION  Mild to moderate Depression         7/21/2024    12:06 PM   MS TR-7 SCORE & INTERPRETATION   TR-7 SCORE  8   TR-7 SCORE INTERPRETATION Mild Anxiety         7/21/2024    12:11 PM   PEQ MS MOS PAIN EFFECTS SCORE & INTERPRETATION   PES SCORE 11   PES SCORE INTERPRETATION Scores can range from 6-30.  Items are scaled so that higher scores indicate a greater impact of pain on a patients mood and behavior.         7/21/2024    12:13 PM   PEQ MS SEXUAL SATISFACTION SCORE & INTERPRETATION   SSS SCORE  9   SSS SCORE INTERPRETATION Scores can range from 4-24.  Higher scores indicate greater problems with sexual satisfaction.         7/21/2024    12:14 PM   MS BLADDER CONTROL SCORE & INTERPRETATION   BLCS SCORE 8   BLCS SCORE INTERPRETATION  Scores can range from 0-22, with higher scores indicating greater bladder control problems.         7/21/2024    12:20 PM   MS BOWEL CONTROL SCORE & INTERPRETATION   BWCS SCORE 18   BWCS SCORE INTERPRETATION Scores can range from 0-26, with higher scores indicating greater  "bowel control problems.         7/21/2024    12:15 PM   PEQ MS IMPACT OF VISUAL IMPAIRMENT SCORE & INTERPRETATION   AYDEE SCALE SCORE  0   AYDEE SCORE INTERPRETATION Scores can range from 0-15, with higher scores indicating greater impact of visual problems on daily activites.         7/21/2024    12:17 PM   MS PDQ SCORE & INTERPRETATION   PDQ RETROSPECTIVE MEMORY SUBSCALE 7   PDQ ATTENTION/CONCENTRATION SUBSCALE 7   PDQ PROSPECTIVE MEMORY SUBSCALE 3   PDQ PLANNING/ORGANIZATION SUBSCALE 5   PDQ TOTAL SCORE 22   PDQ SCORE INTERPRETATION Scores can range from 0-80, with higher scores indicating greater perceived cognitive impairment.         7/21/2024    12:22 PM   MSSS SCORE & INTERPRETATION   MSSS TANGIBLE SUPPORT SUBSCALE 81.25   MSSS EMOTIONAL/INFORMATIONAL SUPPORT SUBSCALE 100   MSSS AFFECTIONATE SUPPORT SUBSCALE 100   MSSS POSITIVE SOCIAL INTERACTION SUBSCALE 100   MSSS TOTAL SCORE 95.31   MSSS SCORE INTERPRETATION Scores can range from 0-100, with higher scores indicating greater perceived support.       Exam:     Vitals:    08/15/24 1407   BP: 119/83   Pulse: 86   Weight: 68.4 kg (150 lb 12.7 oz)   Height: 5' 4" (1.626 m)          In general, the patient is well nourished and appears to be in no acute distress.    MENTAL STATUS: language is fluent, normal verbal comprehension, short-term and remote memory is intact, attention is normal, patient is alert and oriented x 3, fund of knowlege is appropriate by vocabulary. Behavior and judgment appropriate.     +Lhermitte's     CRANIAL NERVE EXAM:  There is no intrernuclear ophthalmoplegia.  Extraocular muscles are intact. No facial asymmetry. Facial sensation is intact bilaterally. There is no dysarthria. Uvula is midline, and palate moves symmetrically. Shoulder shrug intact bilaterlly. Tongue protrusion is midline. Hearing is intact to finger rub bilaterally. Neck is supple with full ROM    MOTOR EXAM: Normal bulk and tone throughout UE and LE bilaterally.   No " pronator drift; rapid sequential movements are normal;     Strength:  R deltoid 5/5, L deltoid 5/5  R biceps 5/5, L biceps 5/5  R triceps 5/5, L triceps 5/5  R finger flexors 5/5, L finger flexors 5/5  R finger extensors 5/5, L finger extensors 5/5  R finger abductors 5/5, L finger abductors 5/5  R  hip flexors 5/5, L hip flexors 5/5  R  hip extensors 5/5, L hip extensors 5/5  R knee extensors 5/5, L knee extensors 5/5  R knee flexors 5/5, L knee flexors 5/5  R ankle dorsiflexors 5/5, L ankle dorsiflexors 5/5  R ankle plantarflexors 5/5, L ankle plantarflexors 5/5    SENSORY EXAM: Decreased LT in RLE     COORDINATION: Normal finger-to-nose exam. Normal heel-to-shin exam.    GAIT: Narrow based and stable.           2/7/2024    12:01 AM 7/22/2024    12:01 AM 8/15/2024    12:01 AM   Timed 25 Foot Walk:   Did patient wear an AFO? No No No   Was assistive device used? No No No   Time for 25 Foot Walk (seconds) 6.3 5.83 5.78   Time for 25 Foot Walk (seconds) 6.8 6 5.61            No data to display                Imaging (personally reviewed):     Results for orders placed during the hospital encounter of 02/04/24    MRI Brain Demyelinating Without Contrast    Impression  Stable white matter lesions intracranially consistent with the history of demyelinating disease.  No evidence of new lesion.      Electronically signed by: True Vuong  Date:    02/04/2024  Time:    12:40    Results for orders placed during the hospital encounter of 02/04/24    MRI Cervical Spine Demyelinating Without Contrast    Impression  Similar appearance of lesions within the cervical cord with no new demyelinating lesion or cord expansion.      Electronically signed by: True Vuogn  Date:    02/04/2024  Time:    13:06    Results for orders placed during the hospital encounter of 02/01/23    MRI Thoracic Spine Demyelinating Without Contrast    Impression  Multiple short-segment T2 hyperintense lesions scattered throughout the spinal cord with  "the appearance consistent with reported history of multiple sclerosis.  No postcontrast imaging to assess for active disease.  Recommend correlation with prior imaging to assess stability.    Mild cervical spondylosis.    This report was flagged in Epic as abnormal.      Electronically signed by: Jordan Jacobson MD  Date:    02/02/2023  Time:    09:28    No results found for this or any previous visit.    No results found for this or any previous visit.    No results found for this or any previous visit.      Labs:     Lab Results   Component Value Date    [id number] 27 (L) 02/07/2024    CUGSKUAL67ON 30 11/15/2016     No results found for: "JCVINDEX", "JCVANTIBODY"  No results found for: "AA5LKNJX", "ABSOLUTECD3", "NG4FQHSG", "ABSOLUTECD8", "BW7MVIDZ", "ABSOLUTECD4", "LABCD48"  Lab Results   Component Value Date    WBC 10.21 07/22/2024    RBC 4.67 07/22/2024    HGB 14.5 07/22/2024    HCT 43.2 07/22/2024    MCV 93 07/22/2024    MCH 31.0 07/22/2024    MCHC 33.6 07/22/2024    RDW 11.9 07/22/2024     07/22/2024    MPV 9.2 07/22/2024    GRAN 7.5 07/22/2024    GRAN 73.5 (H) 07/22/2024    LYMPH 1.5 07/22/2024    LYMPH 14.8 (L) 07/22/2024    MONO 1.0 07/22/2024    MONO 9.8 07/22/2024    EOS 0.1 07/22/2024    BASO 0.05 07/22/2024    EOSINOPHIL 1.0 07/22/2024    BASOPHIL 0.5 07/22/2024     Sodium   Date Value Ref Range Status   07/22/2024 140 136 - 145 mmol/L Final     Potassium   Date Value Ref Range Status   07/22/2024 4.5 3.5 - 5.1 mmol/L Final     Chloride   Date Value Ref Range Status   07/22/2024 107 95 - 110 mmol/L Final     CO2   Date Value Ref Range Status   07/22/2024 26 23 - 29 mmol/L Final     Glucose   Date Value Ref Range Status   07/22/2024 83 70 - 110 mg/dL Final     BUN   Date Value Ref Range Status   07/22/2024 15 6 - 20 mg/dL Final     Creatinine   Date Value Ref Range Status   07/22/2024 0.7 0.5 - 1.4 mg/dL Final     Calcium   Date Value Ref Range Status   07/22/2024 9.5 8.7 - 10.5 mg/dL Final "     Total Protein   Date Value Ref Range Status   07/22/2024 7.1 6.0 - 8.4 g/dL Final     Albumin   Date Value Ref Range Status   07/22/2024 4.0 3.5 - 5.2 g/dL Final     Total Bilirubin   Date Value Ref Range Status   07/22/2024 0.5 0.1 - 1.0 mg/dL Final     Comment:     For infants and newborns, interpretation of results should be based  on gestational age, weight and in agreement with clinical  observations.    Premature Infant recommended reference ranges:  Up to 24 hours.............<8.0 mg/dL  Up to 48 hours............<12.0 mg/dL  3-5 days..................<15.0 mg/dL  6-29 days.................<15.0 mg/dL       Alkaline Phosphatase   Date Value Ref Range Status   07/22/2024 70 55 - 135 U/L Final     AST   Date Value Ref Range Status   07/22/2024 17 10 - 40 U/L Final     ALT   Date Value Ref Range Status   07/22/2024 16 10 - 44 U/L Final     Anion Gap   Date Value Ref Range Status   07/22/2024 7 (L) 8 - 16 mmol/L Final       Diagnosis/Assessment/Plan:     Multiple Sclerosis  -Assessment: RRMS stable on Ocrevus so far on exam and imaging, recently received mesenchymal stem cell therapy (not autologous) with unclear benefits.   -Imaging: MRI brain and spine in Feb 2025  -Disease Modifying Therapies: Continue Ocrevus, safety labs reviewed and new set ordered    Symptom management   -Continue vestibular therapy for dizziness   -Pelvic floor therapy for sexual dysfunction   -Ok to continue Mag at night, and CoQ10  Plan discussed and questions were answered to satisfaction.  Return to clinic in 6 months .        NEURO MULTIPLE SCLEROSIS IMPRESSION:   Number of relapses in the past year?:  0  Clinical Progression:  Clinically Stable  MRI Progression:  Stable  MS Classification:  Relapsing-Remitting MS  DMT:  No change in management  Symptom Management:  Implement change in symptom management  Implement Change in Symptom Management:  Sexual Dysfunction          Shanon Elizabeth MD, MSc  Attending neurologist

## 2024-08-21 ENCOUNTER — PATIENT MESSAGE (OUTPATIENT)
Dept: ENDOCRINOLOGY | Facility: CLINIC | Age: 49
End: 2024-08-21
Payer: COMMERCIAL

## 2024-08-21 ENCOUNTER — CLINICAL SUPPORT (OUTPATIENT)
Dept: REHABILITATION | Facility: HOSPITAL | Age: 49
End: 2024-08-21
Payer: COMMERCIAL

## 2024-08-21 ENCOUNTER — PATIENT MESSAGE (OUTPATIENT)
Dept: NEUROLOGY | Facility: CLINIC | Age: 49
End: 2024-08-21
Payer: COMMERCIAL

## 2024-08-21 DIAGNOSIS — R15.9 BOWEL AND BLADDER INCONTINENCE: ICD-10-CM

## 2024-08-21 DIAGNOSIS — N39.46 MIXED INCONTINENCE URGE AND STRESS: ICD-10-CM

## 2024-08-21 DIAGNOSIS — R53.1 WEAKNESS: ICD-10-CM

## 2024-08-21 DIAGNOSIS — R27.8 COORDINATION ABNORMAL: ICD-10-CM

## 2024-08-21 DIAGNOSIS — G35 MULTIPLE SCLEROSIS: ICD-10-CM

## 2024-08-21 DIAGNOSIS — R32 BOWEL AND BLADDER INCONTINENCE: ICD-10-CM

## 2024-08-21 DIAGNOSIS — N81.89 PELVIC FLOOR WEAKNESS: Primary | ICD-10-CM

## 2024-08-21 DIAGNOSIS — N39.41 URGE INCONTINENCE OF URINE: ICD-10-CM

## 2024-08-21 PROCEDURE — 97161 PT EVAL LOW COMPLEX 20 MIN: CPT | Mod: PN | Performed by: PHYSICAL THERAPIST

## 2024-08-21 PROCEDURE — 97530 THERAPEUTIC ACTIVITIES: CPT | Mod: PN | Performed by: PHYSICAL THERAPIST

## 2024-08-21 NOTE — PLAN OF CARE
[facility]   Physical Therapy Initial Evaluation     Date: 2024   Name: Thu Juares  Clinic Number: [id number]    Therapy Diagnosis:   Encounter Diagnoses   Name Primary?    Multiple sclerosis     Urge incontinence of urine     Bowel and bladder incontinence     Pelvic floor weakness Yes    Mixed incontinence urge and stress     Coordination abnormal     Weakness      Physician: Tami Pichardo NP    Physician Orders: PT Eval and Treat PF PT  Medical Diagnosis from Referral: G35 (ICD-10-CM) - Multiple sclerosis N39.41 (ICD-10-CM) - Urge incontinence of urine R32,R15.9 (ICD-10-CM) - Bowel and bladder incontinence  Evaluation Date: 2024  Authorization Period Expiration:   Plan of Care Expiration: 2024  Progress Note Due: 2024  Visit #  Visits authorized:    FOTO: 1/3    Precautions: Standard     Time In: 2:00 PM   Time Out: 2:53 PM   Total Appointment Time (timed & untimed codes): 53 minutes    HISTORY      Thu is a 49 y.o. female evaluated on 2024    Physician:  Tami Pichardo NP   Diagnosis:   Encounter Diagnoses   Name Primary?    Multiple sclerosis     Urge incontinence of urine     Bowel and bladder incontinence     Pelvic floor weakness Yes    Mixed incontinence urge and stress     Coordination abnormal     Weakness       Treatment ordered: Physical Therapy  Medical History:   Past Medical History:   Diagnosis Date    Hypothyroidism surgical for benign nodules    Multiple sclerosis       Surgical History:   Past Surgical History:   Procedure Laterality Date     SECTION  10/2013      Medications:   Current Outpatient Medications   Medication Sig    atorvastatin (LIPITOR) 40 MG tablet Take 40 mg by mouth once daily.    baclofen (LIORESAL) 5 mg Tab tablet Take 5mg (1 tablet) by mouth in the morning and afternoon and 10mg (2 tablets) by mouth in the evening.    ergocalciferol (VITAMIN D2) 50,000 unit Cap Take 50,000 Units  by mouth every 7 days.    meclizine (ANTIVERT) 12.5 mg tablet Take 1 tablet (12.5 mg total) by mouth 3 (three) times daily as needed for Dizziness.    ocrelizumab (OCREVUS) 30 mg/mL Soln Every six months.    SYNTHROID 88 mcg tablet Take 1 tablet (88 mcg total) by mouth before breakfast.     No current facility-administered medications for this visit.       Allergies:   Review of patient's allergies indicates:   Allergen Reactions    Natalizumab         Precautions: MS    OB/GYN History:  Caesarean x 2    Bladder/Bowel History: kidney stones, recurrent bladder infections, urinary incontinence, constipation/straining for movement, and trouble holding back gas/feces      SUBJECTIVE     Date of onset: December 2023    History of current complaint: patient with complaints of urinary incontinence and constipation. Has to wear pads now and did not need that in the future. Will go a week without moving bowels. History of fecal incontinence that generates a lot of anxiety.     First stool is hard, then soft diarrhea. Did Fiber when she was in physical therapy, not regularly. Seeing GI tomorrow. Feels full does not want to eat.     Did STEM cell injection and has seen some slight improvement.     Patient's goals for therapy: To improve constipation and bladder control     Pain: Patient reports 0/10, with 0 being the lowest and 10 being the highest.    Activities that cause symptoms: complaints of urgency of urine with full bladder empty, denies stress urinary incontinence, will wipe frequently after bowel movement     Previous treatment included PF physical therapist     Sexually active? Yes - denies pain     Frequency of urination:   Daytime: will use the bathroom if she is leaving the house            Nighttime: 1-2 times a night     Difficulty initiating urine stream: No  Urine stream: strong  Complete emptying: Yes  Bladder leakage: Yes  Frequency of incidents: 1x a month - tries not to put herself in a situation to have  urgency, more frequent if she is away from home, urgency is worse when she gets closer to the bathroom   Amount leaked (urine): full emptying    Frequency of bowel movements: once every 5 days, she went places traveling and took imodium while out to prevent a fecal accident, 2 imodium's in 1 day, did Exlax when she got home because she did not use the bathroom.   Difficulty initiating BM: Yes  Quality/Shape of BM: [place] Stool Chart 1-4  Colon leakage: Yes  Frequency of incidents: uses imodium to prevent accidents   Amount leaked (bowels): full movement  Form of protection: pad  Number of pads required in 24 hours: varies    Types of fluid intake: limited water intake recently, started back drinking coke  Diet: not eating a balanced diet - eats 1x a day   Current exercise: not exercising    Occupation: Pt is recently retired.     OBJECTIVE     ORTHO SCREEN - not assessed    ABDOMINALS - not assessed    VAGINAL PELVIC FLOOR EXAM - to be performed next visit    TREATMENT    Thu participated in dynamic functional therapeutic activities to improve functional performance for 15  minutes, including: encouraged better bowel management and improved water intake. Seeing GI tomorrow.     Education: instructed on general anatomy/physiology of urinary/bowel system; discussed plan of care with patient and parent/guardian; instructed in benefits/risks of treatment; instructed in alternative methods of treatment; instructed in risks of refusing treatment; patient a parent agreed to treatment plan.     Also educated in: anatomy/physiology of pelvic floor, posture/body mechanices, fluid intake/dietary modifications, and behavior modifications    ASSESSMENT      This is a 49 y.o. female referred to outpatient physical therapy and presents with a medical diagnosis of G35 (ICD-10-CM) - Multiple sclerosis N39.41 (ICD-10-CM) - Urge incontinence of urine R32,R15.9 (ICD-10-CM) - Bowel and bladder incontinence. Patient will benefit from  skilled physical therapy to improve bowel and bladder habits and improve quality of life.    Educational/Spiritual/Cultural needs: none  Abuse/Neglect: no signs  Nutritional Status: WDWN   Fall Risk: patient is not a fall risk    Pt's spiritual, cultural and educational needs considered and pt agreeable to plan of care and goals as stated below:     Medical Necessity is demonstrated by the following:  History  Co-morbidities and personal factors that may impact the plan of care [x] LOW: no personal factors / co-morbidities  [] MODERATE: 1-2 personal factors / co-morbidities  [] HIGH: 3+ personal factors / co-morbidities    Moderate / High Support Documentation:   Co-morbidities affecting plan of care: none    Personal Factors:   no deficits     Examination  Body Structures and Functions, activity limitations and participation restrictions that may impact the plan of care [x] LOW: addressing 1-2 elements  [] MODERATE: 3+ elements  [] HIGH: 4+ elements (please support below)    Moderate / High Support Documentation:      Clinical Presentation [x] LOW: stable  [] MODERATE: Evolving  [] HIGH: Unstable     Decision Making/ Complexity Score: low           PLAN    Frequency: 1x per week  Duration: 12 weeks    Short Term Goals: 6 weeks   Patient will deny straining for bowel movements.  Patient will deny fecal incontinence.   Patient will be independent with pelvic floor relaxation to improve bowel evacuation.   Patient will be able to demonstrate improved pelvic floor relaxation and contraction on rehabilitative ultrasound.     Long Term Goals: 12 weeks   Patient will report bristol stool type 4 for bowel movements.   Patient will deny abdominal pain.   Patient will be independent with core and pelvic floor awareness and relaxation.  Patient will demonstrate adequate pelvic floor coordination with contraction and relaxation on sEMG vs rehabilitative ultrasound to allow for improved bowel habits.     Physical therapy will  include: therapeutic exercise, manual therapy, therapeutic activities, neuromuscular re-education, manual therapy, modalities PRN, gait training, and self-care education.     Therapist: Shmuel Saul PT  Board-certified Women's Health Clinical Specialist  8/21/2024

## 2024-09-04 ENCOUNTER — OFFICE VISIT (OUTPATIENT)
Dept: UROGYNECOLOGY | Facility: CLINIC | Age: 49
End: 2024-09-04
Payer: COMMERCIAL

## 2024-09-04 VITALS
SYSTOLIC BLOOD PRESSURE: 140 MMHG | HEART RATE: 77 BPM | BODY MASS INDEX: 25.29 KG/M2 | DIASTOLIC BLOOD PRESSURE: 98 MMHG | HEIGHT: 64 IN | WEIGHT: 148.13 LBS

## 2024-09-04 DIAGNOSIS — N95.2 VAGINAL ATROPHY: Primary | ICD-10-CM

## 2024-09-04 DIAGNOSIS — N89.8 VAGINAL ITCHING: ICD-10-CM

## 2024-09-04 DIAGNOSIS — R30.0 DYSURIA: ICD-10-CM

## 2024-09-04 DIAGNOSIS — R10.9 FLANK PAIN: ICD-10-CM

## 2024-09-04 LAB
BACTERIA #/AREA URNS AUTO: ABNORMAL /HPF
MICROSCOPIC COMMENT: ABNORMAL
NON-SQ EPI CELLS #/AREA URNS AUTO: 1 /HPF
RBC #/AREA URNS AUTO: 2 /HPF (ref 0–4)
SQUAMOUS #/AREA URNS AUTO: 3 /HPF
WBC #/AREA URNS AUTO: 8 /HPF (ref 0–5)

## 2024-09-04 PROCEDURE — 3080F DIAST BP >= 90 MM HG: CPT | Mod: CPTII,S$GLB,, | Performed by: NURSE PRACTITIONER

## 2024-09-04 PROCEDURE — 3077F SYST BP >= 140 MM HG: CPT | Mod: CPTII,S$GLB,, | Performed by: NURSE PRACTITIONER

## 2024-09-04 PROCEDURE — 99999 PR PBB SHADOW E&M-EST. PATIENT-LVL III: CPT | Mod: PBBFAC,,, | Performed by: NURSE PRACTITIONER

## 2024-09-04 PROCEDURE — 51701 INSERT BLADDER CATHETER: CPT | Mod: S$GLB,,, | Performed by: NURSE PRACTITIONER

## 2024-09-04 PROCEDURE — 81001 URINALYSIS AUTO W/SCOPE: CPT | Performed by: NURSE PRACTITIONER

## 2024-09-04 PROCEDURE — 1159F MED LIST DOCD IN RCRD: CPT | Mod: CPTII,S$GLB,, | Performed by: NURSE PRACTITIONER

## 2024-09-04 PROCEDURE — 99213 OFFICE O/P EST LOW 20 MIN: CPT | Mod: 25,S$GLB,, | Performed by: NURSE PRACTITIONER

## 2024-09-04 PROCEDURE — 87086 URINE CULTURE/COLONY COUNT: CPT | Performed by: NURSE PRACTITIONER

## 2024-09-04 PROCEDURE — 3008F BODY MASS INDEX DOCD: CPT | Mod: CPTII,S$GLB,, | Performed by: NURSE PRACTITIONER

## 2024-09-04 PROCEDURE — 81514 NFCT DS BV&VAGINITIS DNA ALG: CPT | Performed by: NURSE PRACTITIONER

## 2024-09-04 PROCEDURE — 1160F RVW MEDS BY RX/DR IN RCRD: CPT | Mod: CPTII,S$GLB,, | Performed by: NURSE PRACTITIONER

## 2024-09-04 RX ORDER — ESTRADIOL 0.1 MG/G
1 CREAM VAGINAL DAILY
Qty: 42.5 G | Refills: 3 | Status: SHIPPED | OUTPATIENT
Start: 2024-09-04

## 2024-09-04 NOTE — PROGRESS NOTES
Urogyn follow up  09/03/2024  .  [facility]  [address]  [facility] [id number]    Thu Juares  [id number]  1975      Thu Juares is a 49 y.o. here for a urogyn follow up for urinary incontinence        Last HPI from 03/22/2023     1)  UI:  (--) TERRI < (+) UUI  X 3years. Occasionally does not make it to the restroom if holding longer than 3-4 hours-- can not stop the flow of urine (+) pads:mostly for protection.  Daytime frequency: Q 3 hours.  Nocturia: Yes: 1/night.   (--) dysuria,  (--) hematuria,  (--) frequent UTIs.  (+) complete bladder emptying.      2)  POP:  Absent. Symptoms:(--)  .  (--) vaginal bleeding. (--) vaginal discharge. (+) sexually active.  (--) dyspareunia.   (--)  Vaginal dryness.  (--) vaginal estrogen use.  Anorgasma--present for the past year     3)  BM:  (+) constipation/straining.  (--) chronic diarrhea. (--) hematochezia.  (--) fecal incontinence.  (+) fecal smearing/urgency.  (--) complete evacuation.       4)MS  --diagnosed in 2004  --typical flare starts with vertigo. No sleeping can cause exacerbaton as well as word finding  --taking ocrevis--last dose in 02/2023 07/19/2023     1)  Mixed urinary incontinence, urge > stress:    --has been going to pelvic floor PT  --still has urinary urgency  --voiding every 2-3 hours while awake     2)Constipation:  --improved with  fiber supplement    3)complains of RL back pain    11/06/2023  1)  Mixed urinary incontinence, urge > stress:    --completed pelvic floor PT  --symptoms initially improved--has not been compliant with HEP  --voiding every 3 hours while awake  --does have urinary urgency if she ignores urge  --nocturia 1-2/ night  --did not try gemtesa     2)Constipation:  --taking probiotics  --stopped fiber supplement  --intermittent constipation    12/2023--had kidney stone-- treated at [facility]    06/24/22024 E visit for dysruia --UC multiple organisms none in predominance    Changes since last  visit  Began having dysuria 2 days ago-- thinks she passed a stone Monday. Still has flank pain  Urinary frequency improved after she passed the stone.  Still has urinary urgency  + R flank pain  Denies fever  +UUI if ignores urge--harder to stop  Voiding every 1-2 hours during the day  Nocturia 1-2/night  Wearing a pad for protection             Past Medical History:   Diagnosis Date    Hypothyroidism surgical for benign nodules    Multiple sclerosis        Past Surgical History:   Procedure Laterality Date     SECTION  10/2013       Family History   Problem Relation Name Age of Onset    Breast cancer Sister      Thyroid disease Neg Hx      Diabetes Neg Hx         Social History     Socioeconomic History    Marital status:    Tobacco Use    Smoking status: Never    Smokeless tobacco: Former   Substance and Sexual Activity    Alcohol use: Not Currently    Drug use: Never    Sexual activity: Yes     Partners: Male     Social Determinants of Health     Financial Resource Strain: Low Risk  (2024)    Overall Financial Resource Strain (CARDIA)     Difficulty of Paying Living Expenses: Not hard at all   Food Insecurity: No Food Insecurity (2024)    Hunger Vital Sign     Worried About Running Out of Food in the Last Year: Never true     Ran Out of Food in the Last Year: Never true   Physical Activity: Inactive (2024)    Exercise Vital Sign     Days of Exercise per Week: 0 days     Minutes of Exercise per Session: 0 min   Stress: No Stress Concern Present (2024)    [employer] [place] - Occupational Stress Questionnaire     Feeling of Stress : Only a little   Housing Stability: Unknown (2024)    Housing Stability Vital Sign     Unable to Pay for Housing in the Last Year: No       Current Outpatient Medications   Medication Sig Dispense Refill    atorvastatin (LIPITOR) 40 MG tablet Take 40 mg by mouth once daily.      baclofen (LIORESAL) 5 mg Tab tablet Take 5mg (1  "tablet) by mouth in the morning and afternoon and 10mg (2 tablets) by mouth in the evening. 360 tablet 1    ergocalciferol (VITAMIN D2) 50,000 unit Cap Take 50,000 Units by mouth every 7 days.      meclizine (ANTIVERT) 12.5 mg tablet Take 1 tablet (12.5 mg total) by mouth 3 (three) times daily as needed for Dizziness. 90 tablet 5    ocrelizumab (OCREVUS) 30 mg/mL Soln Every six months.      SYNTHROID 88 mcg tablet Take 1 tablet (88 mcg total) by mouth before breakfast. 90 tablet 1    estradioL (ESTRACE) 0.01 % (0.1 mg/gram) vaginal cream Place 1 g vaginally once daily. 42.5 g 3     No current facility-administered medications for this visit.       Review of patient's allergies indicates:   Allergen Reactions    Natalizumab        Well woman:  Pap test: 10/2022 normal per report.  History of abnormal paps: Yes - in 1990's-- had cryo.  History of STIs:  No  Mammogram: Date of last: 1/2022.  Result: Normal per patient request  Colonoscopy: Date of last: 02/2023.  Result:  polyp per patient report-- repeat in 7 years.    DEXA:  n/a     ROS:  As per HPI.      Exam  BP (!) 140/98 (BP Location: Right arm, Patient Position: Sitting, BP Method: Medium (Automatic))   Pulse 77   Ht 5' 4" (1.626 m)   Wt 67.2 kg (148 lb 2.4 oz)   BMI 25.43 kg/m²   General: alert and oriented, no acute distress  Respiratory: normal respiratory effort  Abd: soft, non-tender, non-distended  No CVA tenderness bilaterally      PELVIC EXAM:   VULVA: normal appearing vulva with no masses, tenderness or lesions,   VAGINA: normal appearing vagina with normal color and discharge, no lesions, atrophic,  CERVIX: normal appearing cervix without discharge or lesions,   UTERUS: uterus is normal size, shape, consistency and nontender,   ADNEXA: no masses,   RECTAL: deferred      Impression  1. Vaginal atrophy  estradioL (ESTRACE) 0.01 % (0.1 mg/gram) vaginal cream      2. Dysuria  CULTURE, URINE    Urinalysis Microscopic      3. Vaginal itching  Vaginosis " Screen by DNA Probe      4. Flank pain            We reviewed the above issues and discussed options for short-term versus long-term management of her problems.   Plan:      1)  Mixed urinary incontinence, urge > stress:    --Empty bladder every 3 hours.  Empty well: wait a minute, lean forward on toilet.    --Avoid dietary irritants (see sheet).  Keep diary x 3-5 days to determine your irritants.  --do pelvic floor home exercise program  --URGE: consider gemtesa if estrogen cream doesn't healp        2)Constipation:  --Take 1 fiber pill/day x 3 days.  Then 2 pills/day x 3 days.  Then 3 pills/day x 3 days...increasing in this fashion to max 6 pills a day.  STOP when you find dose that makes stool easy to pass (this may be 1 pill a day or may be 4 pills/day).  Continue at this dose FOREVER.  Additionally, take 1-2 stool softener pills (EX: colace) OTC daily.  AVOID laxatives.    OR  You can use the wafers- make sure to drink water with it.       3)start vaginal estrogen cream  --use dime size amount in vagina-- insert to your second knuckle and rub around just inside vaginal opening nightly x 2 weeks then twice weekly    4)? Uti vs. Kidney stone vs. Hematuria  --urine culture / micro today  --will order testing depending on results    5)vaginal--vulvar itching  --affirm today    6)RTC 2 months       I spent a total of 20 minutes on the day of the visit.  This includes face to face time and non-face to face time preparing to see the patient (eg, review of tests), obtaining and/or reviewing separately obtained history, documenting clinical information in the electronic or other health record, independently interpreting results and communicating results to the patient/family/caregiver, or care coordinator.     Cinthya Kumari, KAL-BC [facility]  Division of Female Pelvic Medicine and Reconstructive Surgery  Department of Obstetrics & Gynecology

## 2024-09-04 NOTE — PATIENT INSTRUCTIONS
1)  Mixed urinary incontinence, urge > stress:    --Empty bladder every 3 hours.  Empty well: wait a minute, lean forward on toilet.    --Avoid dietary irritants (see sheet).  Keep diary x 3-5 days to determine your irritants.  --do pelvic floor home exercise program  --URGE: consider gemtesa if estrogen cream doesn't healp        2)Constipation:  --Take 1 fiber pill/day x 3 days.  Then 2 pills/day x 3 days.  Then 3 pills/day x 3 days...increasing in this fashion to max 6 pills a day.  STOP when you find dose that makes stool easy to pass (this may be 1 pill a day or may be 4 pills/day).  Continue at this dose FOREVER.  Additionally, take 1-2 stool softener pills (EX: colace) OTC daily.  AVOID laxatives.    OR  You can use the wafers- make sure to drink water with it.       3)start vaginal estrogen cream  --use dime size amount in vagina-- insert to your second knuckle and rub around just inside vaginal opening nightly x 2 weeks then twice weekly    4)? Uti vs. Kidney stone vs. Hematuria  --urine culture / micro today  --will order testing depending on results    5)vaginal--vulvar itching  --affirm today    6)RTC 2 months

## 2024-09-06 ENCOUNTER — PATIENT MESSAGE (OUTPATIENT)
Dept: UROGYNECOLOGY | Facility: CLINIC | Age: 49
End: 2024-09-06
Payer: COMMERCIAL

## 2024-09-06 PROBLEM — R26.2 DIFFICULTY WALKING: Status: RESOLVED | Noted: 2018-10-23 | Resolved: 2024-09-06

## 2024-09-06 PROBLEM — R26.89 IMBALANCE: Status: RESOLVED | Noted: 2021-04-15 | Resolved: 2024-09-06

## 2024-09-06 PROBLEM — R42 DIZZINESS: Status: RESOLVED | Noted: 2018-10-22 | Resolved: 2024-09-06

## 2024-09-06 LAB
BACTERIA UR CULT: NO GROWTH
BACTERIAL VAGINOSIS DNA: NEGATIVE
CANDIDA GLABRATA DNA: NEGATIVE
CANDIDA KRUSEI DNA: NEGATIVE
CANDIDA RRNA VAG QL PROBE: NEGATIVE
T VAGINALIS RRNA GENITAL QL PROBE: NEGATIVE

## 2024-09-09 DIAGNOSIS — R10.9 FLANK PAIN: ICD-10-CM

## 2024-09-09 DIAGNOSIS — R30.0 DYSURIA: ICD-10-CM

## 2024-09-09 DIAGNOSIS — R10.9 ABDOMINAL PAIN, UNSPECIFIED ABDOMINAL LOCATION: Primary | ICD-10-CM

## 2024-09-16 ENCOUNTER — HOSPITAL ENCOUNTER (OUTPATIENT)
Dept: RADIOLOGY | Facility: HOSPITAL | Age: 49
Discharge: HOME | End: 2024-09-16
Attending: NURSE PRACTITIONER
Payer: COMMERCIAL

## 2024-09-16 DIAGNOSIS — R30.0 DYSURIA: ICD-10-CM

## 2024-09-16 DIAGNOSIS — R10.9 FLANK PAIN: ICD-10-CM

## 2024-09-16 DIAGNOSIS — R10.9 ABDOMINAL PAIN, UNSPECIFIED ABDOMINAL LOCATION: ICD-10-CM

## 2024-09-16 PROCEDURE — 74176 CT ABD & PELVIS W/O CONTRAST: CPT | Mod: TC

## 2024-09-16 PROCEDURE — 74176 CT ABD & PELVIS W/O CONTRAST: CPT | Mod: 26,,, | Performed by: RADIOLOGY

## 2024-09-18 ENCOUNTER — PATIENT MESSAGE (OUTPATIENT)
Dept: ENDOCRINOLOGY | Facility: CLINIC | Age: 49
End: 2024-09-18
Payer: COMMERCIAL

## 2024-09-18 ENCOUNTER — LAB VISIT (OUTPATIENT)
Dept: LAB | Facility: HOSPITAL | Age: 49
End: 2024-09-18
Attending: NURSE PRACTITIONER
Payer: COMMERCIAL

## 2024-09-18 DIAGNOSIS — E89.0 POST-SURGICAL HYPOTHYROIDISM: ICD-10-CM

## 2024-09-18 DIAGNOSIS — E89.0 POST-SURGICAL HYPOTHYROIDISM: Primary | ICD-10-CM

## 2024-09-18 LAB
T4 FREE SERPL-MCNC: 1.11 NG/DL (ref 0.71–1.51)
TSH SERPL DL<=0.005 MIU/L-ACNC: 0.27 UIU/ML (ref 0.4–4)

## 2024-09-18 PROCEDURE — 84439 ASSAY OF FREE THYROXINE: CPT | Performed by: INTERNAL MEDICINE

## 2024-09-18 PROCEDURE — 84443 ASSAY THYROID STIM HORMONE: CPT | Performed by: INTERNAL MEDICINE

## 2024-09-18 PROCEDURE — 36415 COLL VENOUS BLD VENIPUNCTURE: CPT | Performed by: INTERNAL MEDICINE

## 2024-09-19 ENCOUNTER — PATIENT MESSAGE (OUTPATIENT)
Dept: UROGYNECOLOGY | Facility: CLINIC | Age: 49
End: 2024-09-19
Payer: COMMERCIAL

## 2024-09-20 ENCOUNTER — TELEPHONE (OUTPATIENT)
Dept: UROGYNECOLOGY | Facility: CLINIC | Age: 49
End: 2024-09-20
Payer: COMMERCIAL

## 2024-09-20 NOTE — TELEPHONE ENCOUNTER
Reviewed renal stone Ct with patient. Will reach out to pcp regarding need for further imaging. Cinthya Kumari, BRYANTP-BC

## 2024-09-20 NOTE — TELEPHONE ENCOUNTER
----- Message from Gabrielle Aguirre sent at 9/20/2024  9:50 AM CDT -----  Regarding: Test results  Contact: [phone]  Type:  Needs Medical Advice    Who Called: Thu Andino  is requesting a call back for test results  Would the patient rather a call back or a response via [facility]? Call back  Best Call Back Number: [phone]    Additional Information:

## 2024-09-23 ENCOUNTER — HOSPITAL ENCOUNTER (EMERGENCY)
Facility: HOSPITAL | Age: 49
Discharge: HOME | End: 2024-09-23
Attending: FAMILY MEDICINE
Payer: COMMERCIAL

## 2024-09-23 VITALS
BODY MASS INDEX: 24.88 KG/M2 | OXYGEN SATURATION: 99 % | HEIGHT: 64 IN | WEIGHT: 145.75 LBS | DIASTOLIC BLOOD PRESSURE: 69 MMHG | HEART RATE: 93 BPM | TEMPERATURE: 98 F | SYSTOLIC BLOOD PRESSURE: 127 MMHG | RESPIRATION RATE: 19 BRPM

## 2024-09-23 DIAGNOSIS — R19.7 VOMITING AND DIARRHEA: Primary | ICD-10-CM

## 2024-09-23 DIAGNOSIS — R11.10 VOMITING AND DIARRHEA: Primary | ICD-10-CM

## 2024-09-23 DIAGNOSIS — R11.10 VOMITING: ICD-10-CM

## 2024-09-23 LAB
ALBUMIN SERPL BCP-MCNC: 4.4 G/DL (ref 3.5–5.2)
ALP SERPL-CCNC: 85 U/L (ref 55–135)
ALT SERPL W/O P-5'-P-CCNC: 16 U/L (ref 10–44)
ANION GAP SERPL CALC-SCNC: 12 MMOL/L (ref 8–16)
AST SERPL-CCNC: 18 U/L (ref 10–40)
BASOPHILS # BLD AUTO: 0.07 K/UL (ref 0–0.2)
BASOPHILS NFR BLD: 0.4 % (ref 0–1.9)
BILIRUB SERPL-MCNC: 0.7 MG/DL (ref 0.1–1)
BILIRUB UR QL STRIP: ABNORMAL
BUN SERPL-MCNC: 17 MG/DL (ref 6–20)
CALCIUM SERPL-MCNC: 9.6 MG/DL (ref 8.7–10.5)
CHLORIDE SERPL-SCNC: 106 MMOL/L (ref 95–110)
CLARITY UR: CLEAR
CO2 SERPL-SCNC: 25 MMOL/L (ref 23–29)
COLOR UR: YELLOW
CREAT SERPL-MCNC: 0.8 MG/DL (ref 0.5–1.4)
DIFFERENTIAL METHOD BLD: ABNORMAL
EOSINOPHIL # BLD AUTO: 0.7 K/UL (ref 0–0.5)
EOSINOPHIL NFR BLD: 3.8 % (ref 0–8)
ERYTHROCYTE [DISTWIDTH] IN BLOOD BY AUTOMATED COUNT: 11.6 % (ref 11.5–14.5)
EST. GFR  (NO RACE VARIABLE): >60 ML/MIN/1.73 M^2
GLUCOSE SERPL-MCNC: 88 MG/DL (ref 70–110)
GLUCOSE UR QL STRIP: NEGATIVE
HCT VFR BLD AUTO: 43.9 % (ref 37–48.5)
HGB BLD-MCNC: 15.4 G/DL (ref 12–16)
HGB UR QL STRIP: ABNORMAL
IMM GRANULOCYTES # BLD AUTO: 0.07 K/UL (ref 0–0.04)
IMM GRANULOCYTES NFR BLD AUTO: 0.4 % (ref 0–0.5)
KETONES UR QL STRIP: ABNORMAL
LEUKOCYTE ESTERASE UR QL STRIP: NEGATIVE
LIPASE SERPL-CCNC: 22 U/L (ref 4–60)
LYMPHOCYTES # BLD AUTO: 1.7 K/UL (ref 1–4.8)
LYMPHOCYTES NFR BLD: 10 % (ref 18–48)
MCH RBC QN AUTO: 31.3 PG (ref 27–31)
MCHC RBC AUTO-ENTMCNC: 35.1 G/DL (ref 32–36)
MCV RBC AUTO: 89 FL (ref 82–98)
MONOCYTES # BLD AUTO: 1 K/UL (ref 0.3–1)
MONOCYTES NFR BLD: 5.8 % (ref 4–15)
NEUTROPHILS # BLD AUTO: 13.5 K/UL (ref 1.8–7.7)
NEUTROPHILS NFR BLD: 79.6 % (ref 38–73)
NITRITE UR QL STRIP: NEGATIVE
NRBC BLD-RTO: 0 /100 WBC
PH UR STRIP: 6 [PH] (ref 5–8)
PLATELET # BLD AUTO: 297 K/UL (ref 150–450)
PMV BLD AUTO: 8.6 FL (ref 9.2–12.9)
POTASSIUM SERPL-SCNC: 4.1 MMOL/L (ref 3.5–5.1)
PROT SERPL-MCNC: 7 G/DL (ref 6–8.4)
PROT UR QL STRIP: NEGATIVE
RBC # BLD AUTO: 4.92 M/UL (ref 4–5.4)
SODIUM SERPL-SCNC: 143 MMOL/L (ref 136–145)
SP GR UR STRIP: >=1.03 (ref 1–1.03)
TROPONIN I SERPL DL<=0.01 NG/ML-MCNC: <0.006 NG/ML (ref 0–0.03)
URN SPEC COLLECT METH UR: ABNORMAL
UROBILINOGEN UR STRIP-ACNC: NEGATIVE EU/DL
WBC # BLD AUTO: 16.93 K/UL (ref 3.9–12.7)

## 2024-09-23 PROCEDURE — 84484 ASSAY OF TROPONIN QUANT: CPT | Performed by: NURSE PRACTITIONER

## 2024-09-23 PROCEDURE — 25000003 PHARM REV CODE 250: Performed by: NURSE PRACTITIONER

## 2024-09-23 PROCEDURE — 25500020 PHARM REV CODE 255: Performed by: SURGERY

## 2024-09-23 PROCEDURE — 81003 URINALYSIS AUTO W/O SCOPE: CPT | Performed by: NURSE PRACTITIONER

## 2024-09-23 PROCEDURE — 99285 EMERGENCY DEPT VISIT HI MDM: CPT | Mod: 25

## 2024-09-23 PROCEDURE — 63600175 PHARM REV CODE 636 W HCPCS: Performed by: NURSE PRACTITIONER

## 2024-09-23 PROCEDURE — 83690 ASSAY OF LIPASE: CPT | Performed by: NURSE PRACTITIONER

## 2024-09-23 PROCEDURE — 80053 COMPREHEN METABOLIC PANEL: CPT | Performed by: NURSE PRACTITIONER

## 2024-09-23 PROCEDURE — 93005 ELECTROCARDIOGRAM TRACING: CPT

## 2024-09-23 PROCEDURE — 85025 COMPLETE CBC W/AUTO DIFF WBC: CPT | Performed by: NURSE PRACTITIONER

## 2024-09-23 PROCEDURE — 96361 HYDRATE IV INFUSION ADD-ON: CPT

## 2024-09-23 PROCEDURE — 93010 ELECTROCARDIOGRAM REPORT: CPT | Mod: ,,, | Performed by: INTERNAL MEDICINE

## 2024-09-23 PROCEDURE — 96374 THER/PROPH/DIAG INJ IV PUSH: CPT | Mod: 59

## 2024-09-23 PROCEDURE — 36415 COLL VENOUS BLD VENIPUNCTURE: CPT | Performed by: NURSE PRACTITIONER

## 2024-09-23 RX ORDER — LOPERAMIDE HYDROCHLORIDE 2 MG/1
2 CAPSULE ORAL 4 TIMES DAILY PRN
Qty: 12 CAPSULE | Refills: 0 | Status: SHIPPED | OUTPATIENT
Start: 2024-09-23 | End: 2024-10-03

## 2024-09-23 RX ORDER — ONDANSETRON HYDROCHLORIDE 2 MG/ML
4 INJECTION, SOLUTION INTRAVENOUS
Status: COMPLETED | OUTPATIENT
Start: 2024-09-23 | End: 2024-09-23

## 2024-09-23 RX ORDER — ONDANSETRON 4 MG/1
4 TABLET, FILM COATED ORAL EVERY 6 HOURS
Qty: 12 TABLET | Refills: 0 | Status: SHIPPED | OUTPATIENT
Start: 2024-09-23

## 2024-09-23 RX ADMIN — IOHEXOL 75 ML: 350 INJECTION, SOLUTION INTRAVENOUS at 07:09

## 2024-09-23 RX ADMIN — SODIUM CHLORIDE 1000 ML: 9 INJECTION, SOLUTION INTRAVENOUS at 05:09

## 2024-09-23 RX ADMIN — ONDANSETRON 4 MG: 2 INJECTION INTRAMUSCULAR; INTRAVENOUS at 05:09

## 2024-09-23 NOTE — ED TRIAGE NOTES
C/o diarrhea and vomiting since Saturday. Patient reports ate small amounts yesterday and did not vomit or have diarrhea, but today ate regular and vomiting and diarrhea has started again. Patient with chronic upper right abdominal pain.

## 2024-09-23 NOTE — ED PROVIDER NOTES
Encounter Date: 2024       History     Chief Complaint   Patient presents with    Vomiting    Diarrhea    Abdominal Pain     Chief complaint: Vomiting, diarrhea   [age] year female presents to be evaluated for vomiting and diarrhea that she has had intermittently for many years with a recent flare-up last several days.  She reports that she has had chronic gallbladder pain for approximately 10 years.  Recently had a HIDA scan in his 35% EF of the gallbladder.  She reports that she frequently has vomiting and diarrhea after eating.  She reports that she has been taking Zofran with some improvement in symptoms.  Currently states she has 2/10 deep aching abdominal pain.      Review of patient's allergies indicates:   Allergen Reactions    Natalizumab      Past Medical History:   Diagnosis Date    Hypothyroidism surgical for benign nodules    Multiple sclerosis      Past Surgical History:   Procedure Laterality Date     SECTION  10/2013     Family History   Problem Relation Name Age of Onset    Breast cancer Sister      Thyroid disease Neg Hx      Diabetes Neg Hx       Social History     Tobacco Use    Smoking status: Never    Smokeless tobacco: Former   Substance Use Topics    Alcohol use: Not Currently    Drug use: Never     Review of Systems   Constitutional:  Negative for fatigue and fever.   Respiratory:  Negative for shortness of breath and wheezing.    Cardiovascular:  Negative for chest pain and palpitations.   Gastrointestinal:  Positive for abdominal pain. Negative for diarrhea, nausea and vomiting.   Musculoskeletal:  Negative for back pain.       Physical Exam     Initial Vitals [24 1635]   BP Pulse Resp Temp SpO2   132/79 108 20 98.2 °F (36.8 °C) 99 %      MAP       --         Physical Exam    Nursing note and vitals reviewed.  Constitutional: She appears well-developed and well-nourished.   HENT:   Head: Normocephalic and atraumatic.   Cardiovascular:  Normal rate, regular rhythm  and normal heart sounds.     Exam reveals no gallop and no friction rub.       No murmur heard.  Pulmonary/Chest: Breath sounds normal. She has no wheezes. She has no rhonchi. She has no rales.   Abdominal: Abdomen is soft. Bowel sounds are normal. She exhibits no distension. There is no abdominal tenderness. There is no rebound and no guarding.     Neurological: She is alert and oriented to person, place, and time.   Skin: Skin is warm and dry.   Psychiatric: She has a normal mood and affect. Thought content normal.         ED Course   Procedures  Labs Reviewed   CBC W/ AUTO DIFFERENTIAL - Abnormal       Result Value    WBC 16.93 (*)     RBC 4.92      Hemoglobin 15.4      Hematocrit 43.9      MCV 89      MCH 31.3 (*)     MCHC 35.1      RDW 11.6      Platelets 297      MPV 8.6 (*)     Immature Granulocytes 0.4      Gran # (ANC) 13.5 (*)     Immature Grans (Abs) 0.07 (*)     Lymph # 1.7      Mono # 1.0      Eos # 0.7 (*)     Baso # 0.07      nRBC 0      Gran % 79.6 (*)     Lymph % 10.0 (*)     Mono % 5.8      Eosinophil % 3.8      Basophil % 0.4      Differential Method Automated     URINALYSIS, REFLEX TO URINE CULTURE - Abnormal    Specimen UA Urine, Clean Catch      Color, UA Yellow      Appearance, UA Clear      pH, UA 6.0      Specific Gravity, UA >=1.030 (*)     Protein, UA Negative      Glucose, UA Negative      Ketones, UA 2+ (*)     Bilirubin (UA) 1+ (*)     Occult Blood UA Trace (*)     Nitrite, UA Negative      Urobilinogen, UA Negative      Leukocytes, UA Negative      Narrative:     Specimen Source->Urine   COMPREHENSIVE METABOLIC PANEL    Sodium 143      Potassium 4.1      Chloride 106      CO2 25      Glucose 88      BUN 17      Creatinine 0.8      Calcium 9.6      Total Protein 7.0      Albumin 4.4      Total Bilirubin 0.7      Alkaline Phosphatase 85      AST 18      ALT 16      eGFR >60      Anion Gap 12     LIPASE    Lipase 22     TROPONIN I    Troponin I <0.006       EKG Readings: (Independently  Interpreted)   Initial Reading: No STEMI. Previous EKG: Compared with most recent EKG Rhythm: Normal Sinus Rhythm. Heart Rate: 98. Ectopy: No Ectopy. Conduction: Normal.       Imaging Results              CT Abdomen Pelvis With IV Contrast NO Oral Contrast (Final result)  Result time 09/23/24 20:40:17      Final result by Eber Hutchison DO (09/23/24 20:40:17)                   Impression:      No acute abnormality of the abdomen or pelvis.      Electronically signed by: Eber Hutchison  Date:    09/23/2024  Time:    20:40               Narrative:    EXAMINATION:  CT ABDOMEN PELVIS WITH IV CONTRAST    CLINICAL HISTORY:  Abdominal pain, acute, nonlocalized;Abdominal pain, post-op;    TECHNIQUE:  Axial CT images with sagittal and coronal reformats were obtained of the abdomen and pelvis from the hemidiaphragms through the symphysis pubis after the administration of 75mL Omnipaque 350.    COMPARISON:  CT abdomen and pelvis from 09/16/2024.    FINDINGS:  Lung Bases: Clear.    Heart: Heart size is normal.  No pericardial effusion.    Liver: The liver is normal in size and demonstrates homogeneous enhancement without focal lesion.  The portal vasculature is patent.    Biliary tract: No intrahepatic or extrahepatic biliary ductal dilatation.    Gallbladder: No radiodense gallstone. No wall thickening or pericholecystic fluid.    Pancreas: Normal. No pancreatic ductal dilatation.    Spleen: Normal size without focal lesion.    Adrenals: Unremarkable.    Kidneys and urinary collecting systems: Normal.  No hydronephrosis or urolithiasis.    Lymph nodes: None enlarged.    Stomach and bowel: The stomach is normal.  Loops of small and large bowel are normal in caliber without evidence for inflammation or obstruction.  The appendix is normal.    Peritoneum and mesentery: No ascites or free intraperitoneal air.  No abdominal fluid collection.    Vasculature: Mild atherosclerosis.  No aneurysm.    Urinary bladder: The urinary  bladder is contracted.    Reproductive organs: The uterus and adnexae are unremarkable.    Body wall: No abnormality.    Musculoskeletal: No aggressive osseous lesion.  Sclerotic appearance of the right sacroiliac joint noted, stable                                       Medications   sodium chloride 0.9% bolus 1,000 mL 1,000 mL (0 mLs Intravenous Stopped 9/23/24 1828)   ondansetron injection 4 mg (4 mg Intravenous Given 9/23/24 1732)   iohexoL (OMNIPAQUE 350) injection 75 mL (75 mLs Intravenous Given 9/23/24 1900)     Medical Decision Making  49-year-old female presents to be evaluated for vomiting diarrhea she has had chronically worsening over the last 48 hours.  She reports that she has had chronic gallbladder pain and has an EF of 35%   Differential diagnoses include gastritis, gastroenteritis, dehydration, electrolyte derangement, cholecystitis, choledocholithiasis    Amount and/or Complexity of Data Reviewed  Labs: ordered.  Radiology: ordered.    Risk  Prescription drug management.  Risk Details: Patient with no abdominal tenderness on palpation   Mild leukocytosis   No gross electrolyte derangement CT of the abdomen and pelvis performed in ED today negative for acute process  Will DC on GI rest and supportive care of symtpoms  Instructed to follow up with Dr. Callejas                                      Clinical Impression:  Final diagnoses:  [R11.10] Vomiting  [R11.10, R19.7] Vomiting and diarrhea (Primary)          ED Disposition Condition    Discharge Stable          ED Prescriptions       Medication Sig Dispense Start Date End Date Auth. Provider    ondansetron (ZOFRAN) 4 MG tablet Take 1 tablet (4 mg total) by mouth every 6 (six) hours. 12 tablet 9/23/2024 -- Kyung Flores NP    loperamide (IMODIUM) 2 mg capsule Take 1 capsule (2 mg total) by mouth 4 (four) times daily as needed for Diarrhea. 12 capsule 9/23/2024 10/3/2024 Kyung Flores NP          Follow-up Information    None           Kyung Flores, NP  09/23/24 2043

## 2024-09-24 ENCOUNTER — OFFICE VISIT (OUTPATIENT)
Dept: GASTROENTEROLOGY | Facility: CLINIC | Age: 49
End: 2024-09-24
Payer: COMMERCIAL

## 2024-09-24 ENCOUNTER — TELEPHONE (OUTPATIENT)
Dept: ENDOSCOPY | Facility: HOSPITAL | Age: 49
End: 2024-09-24
Payer: COMMERCIAL

## 2024-09-24 VITALS
DIASTOLIC BLOOD PRESSURE: 95 MMHG | WEIGHT: 147.25 LBS | HEIGHT: 63 IN | HEART RATE: 100 BPM | BODY MASS INDEX: 26.09 KG/M2 | SYSTOLIC BLOOD PRESSURE: 146 MMHG

## 2024-09-24 VITALS — WEIGHT: 147 LBS | BODY MASS INDEX: 26.05 KG/M2 | HEIGHT: 63 IN

## 2024-09-24 DIAGNOSIS — R32 BOWEL AND BLADDER INCONTINENCE: ICD-10-CM

## 2024-09-24 DIAGNOSIS — R15.9 INCONTINENCE OF FECES, UNSPECIFIED FECAL INCONTINENCE TYPE: Primary | ICD-10-CM

## 2024-09-24 DIAGNOSIS — R15.9 BOWEL AND BLADDER INCONTINENCE: ICD-10-CM

## 2024-09-24 DIAGNOSIS — G35 MULTIPLE SCLEROSIS: ICD-10-CM

## 2024-09-24 DIAGNOSIS — R19.7 DIARRHEA, UNSPECIFIED TYPE: Primary | ICD-10-CM

## 2024-09-24 DIAGNOSIS — K80.20 GALLBLADDER COLIC: ICD-10-CM

## 2024-09-24 DIAGNOSIS — R11.2 NAUSEA AND VOMITING, UNSPECIFIED VOMITING TYPE: ICD-10-CM

## 2024-09-24 LAB
OHS QRS DURATION: 76 MS
OHS QTC CALCULATION: 444 MS

## 2024-09-24 PROCEDURE — 99999 PR PBB SHADOW E&M-EST. PATIENT-LVL V: CPT | Mod: PBBFAC,,,

## 2024-09-24 PROCEDURE — 3008F BODY MASS INDEX DOCD: CPT | Mod: CPTII,S$GLB,,

## 2024-09-24 PROCEDURE — 3077F SYST BP >= 140 MM HG: CPT | Mod: CPTII,S$GLB,,

## 2024-09-24 PROCEDURE — 3080F DIAST BP >= 90 MM HG: CPT | Mod: CPTII,S$GLB,,

## 2024-09-24 PROCEDURE — 1160F RVW MEDS BY RX/DR IN RCRD: CPT | Mod: CPTII,S$GLB,,

## 2024-09-24 PROCEDURE — 99204 OFFICE O/P NEW MOD 45 MIN: CPT | Mod: S$GLB,,,

## 2024-09-24 PROCEDURE — 1159F MED LIST DOCD IN RCRD: CPT | Mod: CPTII,S$GLB,,

## 2024-09-24 RX ORDER — COLESEVELAM 180 1/1
1875 TABLET ORAL 2 TIMES DAILY WITH MEALS
Qty: 540 TABLET | Refills: 3 | Status: SHIPPED | OUTPATIENT
Start: 2024-09-24 | End: 2025-09-24

## 2024-09-24 NOTE — DISCHARGE INSTRUCTIONS
He had a bland diet will symptoms persist   Drink plenty of clear fluids   Please follow-up with your GI doctor

## 2024-09-24 NOTE — PROGRESS NOTES
"   Gastroenterology Clinic Consultation Note    Reason for Visit:  The primary encounter diagnosis was Diarrhea, unspecified type. Diagnoses of Nausea and vomiting, unspecified vomiting type, Bowel and bladder incontinence, Gallbladder colic, and Multiple sclerosis were also pertinent to this visit.    PCP:   No primary care provider on file.   [address] / [place] [address]      Initial HPI   This is a 49 y.o. female with history of multiple sclerosis presenting for fecal incontinence, nausea and vomiting, and diarrhea   Patient in clinic with multiple GI complaints. She was originally referred to GI by her neurologist for fecal incontinence. She reports this has been going since July. She reports she has been dealing with diarrhea for some time but the urgency and incontinence are new. She reports multiple Bms per day which are liquid in nature. She denies sick contacts, recent antibiotic use, does not work with children or in  industry. She reports chronic gallbladder pain for approximately 10 years. Recently had a HIDA scan 35% EF of the gallbladder. She reports that she frequently has vomiting and diarrhea after eating. She goes through "flares" of nausea and vomiting associated with RUQ pain. CT and US recently done were both unremarkable. She went to the ED last night with similar complaints. Discharged with Zofran which helps some. She is currently working with pelvic floor therapy for urinary incontinence. She is not taking anything for the diarrhea. Denies unintentional weight loss, fever, chills, constipation, esophageal reflux, regurgitation, hematemesis, difficulties swallowing, blood in stool, and melena. She denies previous colonoscopy or anorectal manometry.     ROS:  Review of Systems   Constitutional:  Negative for chills, fever, malaise/fatigue and weight loss.   Respiratory:  Negative for shortness of breath.    Cardiovascular:  Negative for chest pain.   Gastrointestinal:  " Positive for abdominal pain, diarrhea, nausea and vomiting. Negative for blood in stool, constipation, heartburn and melena.   Genitourinary:  Negative for flank pain.   Neurological:  Positive for dizziness.        Medical History:  has a past medical history of Hypothyroidism (surgical for benign nodules) and Multiple sclerosis.    Surgical History:  has a past surgical history that includes  section (10/2013).    Family History: family history includes Breast cancer in her sister..       Review of patient's allergies indicates:   Allergen Reactions    Natalizumab        Current Outpatient Medications on File Prior to Visit   Medication Sig Dispense Refill    atorvastatin (LIPITOR) 40 MG tablet Take 40 mg by mouth once daily.      baclofen (LIORESAL) 5 mg Tab tablet Take 5mg (1 tablet) by mouth in the morning and afternoon and 10mg (2 tablets) by mouth in the evening. 360 tablet 1    ergocalciferol (VITAMIN D2) 50,000 unit Cap Take 50,000 Units by mouth every 7 days.      estradioL (ESTRACE) 0.01 % (0.1 mg/gram) vaginal cream Place 1 g vaginally once daily. 42.5 g 3    loperamide (IMODIUM) 2 mg capsule Take 1 capsule (2 mg total) by mouth 4 (four) times daily as needed for Diarrhea. 12 capsule 0    meclizine (ANTIVERT) 12.5 mg tablet Take 1 tablet (12.5 mg total) by mouth 3 (three) times daily as needed for Dizziness. 90 tablet 5    ocrelizumab (OCREVUS) 30 mg/mL Soln Every six months.      ondansetron (ZOFRAN) 4 MG tablet Take 1 tablet (4 mg total) by mouth every 6 (six) hours. 12 tablet 0    SYNTHROID 88 mcg tablet Take 1 tablet (88 mcg total) by mouth before breakfast. 90 tablet 1     Current Facility-Administered Medications on File Prior to Visit   Medication Dose Route Frequency Provider Last Rate Last Admin    [COMPLETED] iohexoL (OMNIPAQUE 350) injection 75 mL  75 mL Intravenous ONCE PRN Eladio Pittman MD   75 mL at 24 1900    [COMPLETED] ondansetron injection 4 mg  4 mg Intravenous ED 1  "Time Kyung Flores, NP   4 mg at 09/23/24 1732    [COMPLETED] sodium chloride 0.9% bolus 1,000 mL 1,000 mL  1,000 mL Intravenous ED 1 Time Kyung Flores, NP   Stopped at 09/23/24 1828         Objective Findings:    Vital Signs:  BP (!) 146/95   Pulse 100   Ht 5' 3" (1.6 m)   Wt 66.8 kg (147 lb 4.3 oz)   LMP 09/17/2024 (Approximate)   BMI 26.09 kg/m²   Body mass index is 26.09 kg/m².    Physical Exam:  Physical Exam  Vitals and nursing note reviewed.   Constitutional:       General: She is not in acute distress.     Appearance: Normal appearance. She is not ill-appearing.   HENT:      Head: Normocephalic and atraumatic.      Right Ear: External ear normal.      Left Ear: External ear normal.      Nose: Nose normal.   Eyes:      General: No scleral icterus.     Extraocular Movements: Extraocular movements intact.   Cardiovascular:      Rate and Rhythm: Normal rate.   Pulmonary:      Effort: Pulmonary effort is normal. No respiratory distress.   Abdominal:      General: Bowel sounds are normal. There is no distension.      Palpations: Abdomen is soft.      Tenderness: There is no guarding.   Musculoskeletal:         General: Normal range of motion.      Cervical back: Normal range of motion.   Skin:     General: Skin is warm.   Neurological:      Mental Status: She is alert and oriented to person, place, and time.   Psychiatric:         Mood and Affect: Mood normal.         Behavior: Behavior is cooperative.         Thought Content: Thought content normal.             Labs:  Lab Results   Component Value Date    WBC 16.93 (H) 09/23/2024    HGB 15.4 09/23/2024    HCT 43.9 09/23/2024     09/23/2024    CHOL 197 04/20/2015    TRIG 250 (H) 04/20/2015    HDL 39 (L) 04/20/2015    ALKPHOS 85 09/23/2024    LIPASE 22 09/23/2024    ALT 16 09/23/2024    AST 18 09/23/2024     09/23/2024    K 4.1 09/23/2024     09/23/2024    CREATININE 0.8 09/23/2024    BUN 17 09/23/2024    CO2 25 09/23/2024 "    TSH 0.265 (L) 09/18/2024       Imaging reviewed:   CT ABDOMEN PELVIS WITH IV CONTRAST    FINDINGS:  Lung Bases: Clear.     Heart: Heart size is normal.  No pericardial effusion.     Liver: The liver is normal in size and demonstrates homogeneous enhancement without focal lesion.  The portal vasculature is patent.     Biliary tract: No intrahepatic or extrahepatic biliary ductal dilatation.     Gallbladder: No radiodense gallstone. No wall thickening or pericholecystic fluid.     Pancreas: Normal. No pancreatic ductal dilatation.     Spleen: Normal size without focal lesion.     Adrenals: Unremarkable.     Kidneys and urinary collecting systems: Normal.  No hydronephrosis or urolithiasis.     Lymph nodes: None enlarged.     Stomach and bowel: The stomach is normal.  Loops of small and large bowel are normal in caliber without evidence for inflammation or obstruction.  The appendix is normal.     Peritoneum and mesentery: No ascites or free intraperitoneal air.  No abdominal fluid collection.     Vasculature: Mild atherosclerosis.  No aneurysm.     Urinary bladder: The urinary bladder is contracted.     Reproductive organs: The uterus and adnexae are unremarkable.     Body wall: No abnormality.     Musculoskeletal: No aggressive osseous lesion.  Sclerotic appearance of the right sacroiliac joint noted, stable     Impression:     No acute abnormality of the abdomen or pelvis.       Electronically signed by:Eber Hutchison  Date:                                            09/23/2024      US ABDOMEN LIMITED_GALLBLADDER     CLINICAL HISTORY:  Gastro-esophageal reflux disease without esophagitis     FINDINGS:  No gallstones.  The common duct measures 4 mm.  The visualized portions of the liver are unremarkable.     Impression:     Normal gallbladder ultrasound.        Electronically signed by:Rian Duron MD  Date:                                            09/05/2024    Endoscopy reviewed: NA      Assessment:  1.  Diarrhea, unspecified type    2. Nausea and vomiting, unspecified vomiting type    3. Bowel and bladder incontinence    4. Gallbladder colic    5. Multiple sclerosis      Orders Placed This Encounter    Anorectal manometry    Clostridium difficile EIA    Stool culture    H. pylori antigen, stool    Pancreatic elastase, fecal    Fecal fat, qualitative    WBC, Stool    Rotavirus antigen, stool    Adenovirus Molecular Detection, PCR, Non-Blood Stool    Calprotectin, Stool    Giardia / Cryptosporidum, EIA    Stool Exam-Ova,Cysts,Parasites    Gastrointestinal Pathogens Panel, PCR    Ambulatory referral/consult to General Surgery    colesevelam (WELCHOL) 625 mg tablet       Plan:  Referral to general surgery for ongoing gallbladder issues  2.   Stool studies to assess for possible acute/infectious/inflammatory processes for diarrhea   3.   Anorectal manometry to further assess fecal incontinence  4.   Continue with pelvic floor therapy   5.   Start Welchol twice per day for diarrhea  6.   Screening colonoscopy once infectious component of diarrhea ruled out       One teaspoonful of psyllium twice daily. It is used for diarrhea due to its water-holding effect in the intestines that may aid in bulking up the watery stool. It can also bind some toxins that may cause acute diarrhea. Psyllium products combined with laxatives should be avoided.   Documentation of stool output provides a baseline and helps direct replacement fluid therapy. Keep a diary of possible food triggers for diarrhea.   Its necessary to increase fluid intake, especially when experiencing diarrhea. Increased fluid intake and liquid meal replacements can replenish fluid loss.  You may use oral rehydration solutions or diluted juices, diluted sports drinks, clear broth, or decaffeinated tea to stay hydrated. Sugary, carbonated, caffeinated, or alcoholic drinks can worsen diarrhea.   BRAT diet of bananas, rice, applesauce, and toast is fine. However, return  to normal diet as soon as you feel up to it.   Utilize Imodium AD as needed        Thank you for allowing me to participate in this patient's care.    Sincerely,     KRISTY CONTE, BRYANTP-C  Gastroenterology Department  [facility] Office [phone]

## 2024-09-24 NOTE — PATIENT INSTRUCTIONS
How is diarrhea treated?  Usually, you can get rid of diarrhea at home. Over-the-counter (OTC) medicines for diarrhea, like bismuth subsalicylate & Imodium, often help people feel better quickly.   Your provider may recommend treatments like:  Fiber Supplements: Such as Metamucil, Citrucel, or Benefiber to bulk-up stool formation  Probiotics. Probiotics introduce good bacteria into your gut to combat diarrhea. Your healthcare provider may suggest you try them. Always talk to your provider before starting a probiotic or any supplement.  Can I stop diarrhea without taking anti-diarrhea medicine?  You can often get rid of acute diarrhea through lifestyle changes you can make at home.  Drink plenty of water and other electrolyte-balanced fluids. These include diluted and pulp-free fruit juices, broths, sports drinks (Gatorade®) and caffeine-free sodas. These drinks replace lost water and electrolytes youre losing with diarrhea. Electrolytes are substances that help with important processes, like maintaining the balance of fluids in your body.  Choose foods that can firm your stools. Certain low-fiber foods make stools more solid. Try the BRAT diet: (B)ananas, white (R)ice, (A)pplesauce and (T)oast. Potatoes, noodles, lean beef, fish and chicken or turkey without the skin are also good options. Changing your diet can make a huge difference when it comes to getting relief.  Avoid caffeine and alcohol. Caffeinated foods and drinks can have a mild laxative effect that worsens diarrhea. Steer clear of coffee, sodas, strong tea/green tea and chocolate. Avoid alcohol, which can lead to dehydration.  Avoid foods and drinks that give you gas. Avoid beans, cabbage, brussels sprouts, beer and carbonated beverages to prevent stomach cramps. Sometimes, diarrhea can make you temporarily lactose intolerant. Avoiding dairy until your diarrhea clears is a good idea.

## 2024-09-24 NOTE — TELEPHONE ENCOUNTER
Spoke to [facility] to schedule procedure(s) ARM       Physician to perform procedure(s) Dr. MICA Rosario  Date of Procedure (s) 11/12/24  Arrival Time 10:45 AM  Time of Procedure(s) 1:00 PM   Location of Procedure(s) [place] [address] [facility]  Type of Rx Prep sent to patient: Enema  Instructions provided to patient via Copy in hand    Patient was informed on the following information and verbalized understanding. Screening questionnaire reviewed with patient and complete. No ride arrangements are required for this procedure.   Appointment details are tentative, especially check-in time. Patient will receive a prep-op call 7 days prior to confirm check-in time for procedure. If applicable the patient should contact their pharmacy to verify Rx for procedure prep is ready for pick-up. Patient was advised to call the scheduling department at [phone] if pharmacy states no Rx is available. Patient was advised to call the endoscopy scheduling department if any questions or concerns arise.       Endoscopy Scheduling Department

## 2024-09-24 NOTE — TELEPHONE ENCOUNTER
"----- Message from DEV Monaco sent at 9/24/2024 11:49 AM CDT -----  Regarding: anorectal manometry  Procedure: anorectal manometry     Diagnosis: fecal incontinence     Procedure Timing: Within 4 weeks (Urgent)    #If within 4 weeks selected, please sivan as high priority#    #If greater than 12 weeks, please select "5-12 weeks" and delay sending until 3 months prior to requested date#     Location: Any Site    Additional Scheduling Information: No scheduling concerns    Prep Specifications:N/A    Is the patient taking a GLP-1 Agonist:no    Have you attached a patient to this message: yes  "

## 2024-09-30 ENCOUNTER — OFFICE VISIT (OUTPATIENT)
Facility: CLINIC | Age: 49
End: 2024-09-30
Payer: COMMERCIAL

## 2024-09-30 ENCOUNTER — CLINICAL SUPPORT (OUTPATIENT)
Dept: REHABILITATION | Facility: HOSPITAL | Age: 49
End: 2024-09-30
Payer: COMMERCIAL

## 2024-09-30 VITALS
OXYGEN SATURATION: 96 % | HEART RATE: 92 BPM | HEIGHT: 63 IN | RESPIRATION RATE: 18 BRPM | BODY MASS INDEX: 25.43 KG/M2 | DIASTOLIC BLOOD PRESSURE: 82 MMHG | WEIGHT: 143.5 LBS | SYSTOLIC BLOOD PRESSURE: 140 MMHG

## 2024-09-30 DIAGNOSIS — R15.9 BOWEL AND BLADDER INCONTINENCE: ICD-10-CM

## 2024-09-30 DIAGNOSIS — N39.46 MIXED INCONTINENCE URGE AND STRESS: ICD-10-CM

## 2024-09-30 DIAGNOSIS — R11.2 NAUSEA AND VOMITING, UNSPECIFIED VOMITING TYPE: ICD-10-CM

## 2024-09-30 DIAGNOSIS — R32 BOWEL AND BLADDER INCONTINENCE: ICD-10-CM

## 2024-09-30 DIAGNOSIS — N39.41 URGE INCONTINENCE OF URINE: Primary | ICD-10-CM

## 2024-09-30 DIAGNOSIS — K80.20 GALLBLADDER COLIC: ICD-10-CM

## 2024-09-30 DIAGNOSIS — N81.89 PELVIC FLOOR WEAKNESS: ICD-10-CM

## 2024-09-30 PROCEDURE — 3079F DIAST BP 80-89 MM HG: CPT | Mod: CPTII,S$GLB,, | Performed by: STUDENT IN AN ORGANIZED HEALTH CARE EDUCATION/TRAINING PROGRAM

## 2024-09-30 PROCEDURE — 1159F MED LIST DOCD IN RCRD: CPT | Mod: CPTII,S$GLB,, | Performed by: STUDENT IN AN ORGANIZED HEALTH CARE EDUCATION/TRAINING PROGRAM

## 2024-09-30 PROCEDURE — 3008F BODY MASS INDEX DOCD: CPT | Mod: CPTII,S$GLB,, | Performed by: STUDENT IN AN ORGANIZED HEALTH CARE EDUCATION/TRAINING PROGRAM

## 2024-09-30 PROCEDURE — 99204 OFFICE O/P NEW MOD 45 MIN: CPT | Mod: S$GLB,,, | Performed by: STUDENT IN AN ORGANIZED HEALTH CARE EDUCATION/TRAINING PROGRAM

## 2024-09-30 PROCEDURE — 3077F SYST BP >= 140 MM HG: CPT | Mod: CPTII,S$GLB,, | Performed by: STUDENT IN AN ORGANIZED HEALTH CARE EDUCATION/TRAINING PROGRAM

## 2024-09-30 PROCEDURE — 99999 PR PBB SHADOW E&M-EST. PATIENT-LVL III: CPT | Mod: PBBFAC,,, | Performed by: STUDENT IN AN ORGANIZED HEALTH CARE EDUCATION/TRAINING PROGRAM

## 2024-09-30 PROCEDURE — 97530 THERAPEUTIC ACTIVITIES: CPT | Mod: PN | Performed by: PHYSICAL THERAPIST

## 2024-09-30 NOTE — PROGRESS NOTES
[facility] History & Physical         Name: Thu Juares   : 1975   MRN: [id number]      History of Present Illness       Chief Compliant: nausea, vomiting, diarrhea, constipation    HPI:  Thu Juares is a 49 y.o. female who presents to discuss her abdominal symptoms.  She has intermittent nausea, vomiting, diarrhea, and constipation.  She has seen Gastroenterology.  She has had a recent right upper quadrant ultrasound, HIDA scan, and CT scan of the abdomen.  Her ejection fraction is normal at 35% and she has no evidence of gallbladder disease or gallstones.  She does say that she occasionally has right upper quadrant pain.      Past Medical History:   Diagnosis Date    Hypothyroidism surgical for benign nodules    Multiple sclerosis      Past Surgical History:   Procedure Laterality Date     SECTION  10/2013     Family History   Problem Relation Name Age of Onset    Breast cancer Sister      Thyroid disease Neg Hx      Diabetes Neg Hx       Patient is allergic to natalizumab.  Social Drivers of Health     Tobacco Use: Medium Risk (2024)    Patient History     Smoking Tobacco Use: Never     Smokeless Tobacco Use: Former     Passive Exposure: Not on file   Alcohol Use: Not At Risk (2024)    AUDIT-C     Frequency of Alcohol Consumption: Monthly or less     Average Number of Drinks: 3 or 4     Frequency of Binge Drinking: Never   Financial Resource Strain: Low Risk  (2024)    Overall Financial Resource Strain (CARDIA)     Difficulty of Paying Living Expenses: Not hard at all   Food Insecurity: No Food Insecurity (2024)    Hunger Vital Sign     Worried About Running Out of Food in the Last Year: Never true     Ran Out of Food in the Last Year: Never true   Transportation Needs: Not on file   Physical Activity: Inactive (2024)    Exercise Vital Sign     Days of Exercise per Week: 0 days     Minutes of Exercise per Session: 0 min   Stress: No Stress Concern Present  (7/21/2024)    [employer] - Occupational Stress Questionnaire     Feeling of Stress : Only a little   Housing Stability: Unknown (7/21/2024)    Housing Stability Vital Sign     Unable to Pay for Housing in the Last Year: No     Homeless in the Last Year: Not on file   Depression: Low Risk  (3/22/2023)    Depression     Last PHQ-4: Flowsheet Data: 0   Utilities: Not At Risk (7/21/2024)    [facility] Utilities     Threatened with loss of utilities: No   Health Literacy: Adequate Health Literacy (7/21/2024)     Health Literacy     Frequency of need for help with medical instructions: Never   Social Isolation: Not on file        Home Medications:   Prior to Admission medications    Medication Sig Start Date End Date Taking? Authorizing Provider   atorvastatin (LIPITOR) 40 MG tablet Take 40 mg by mouth once daily.   Yes Provider, Historical   baclofen (LIORESAL) 5 mg Tab tablet Take 5mg (1 tablet) by mouth in the morning and afternoon and 10mg (2 tablets) by mouth in the evening. 7/22/24  Yes Tami Pichardo NP   colesevelam (WELCHOL) 625 mg tablet Take 3 tablets (1,875 mg total) by mouth 2 (two) times daily with meals. 9/24/24 9/24/25 Yes Daiana Vences FNP-C   ergocalciferol (VITAMIN D2) 50,000 unit Cap Take 50,000 Units by mouth every 7 days.   Yes Provider, Historical   estradioL (ESTRACE) 0.01 % (0.1 mg/gram) vaginal cream Place 1 g vaginally once daily. 9/4/24  Yes Cinthya Kumari NP   loperamide (IMODIUM) 2 mg capsule Take 1 capsule (2 mg total) by mouth 4 (four) times daily as needed for Diarrhea. 9/23/24 10/3/24 Yes Kyung Flores NP   meclizine (ANTIVERT) 12.5 mg tablet Take 1 tablet (12.5 mg total) by mouth 3 (three) times daily as needed for Dizziness. 7/22/24  Yes Tami Pichardo NP   ocrelizumab (OCREVUS) 30 mg/mL Soln Every six months. 1/23/19  Yes Rosalva Vieyra MD   ondansetron (ZOFRAN) 4 MG tablet Take 1 tablet (4 mg total) by mouth every 6  "(six) hours. 9/23/24  Yes Kyung Flores NP   SYNTHROID 88 mcg tablet Take 1 tablet (88 mcg total) by mouth before breakfast. 9/9/24  Yes Rosalva Vieyra MD       Review of Systems: Complete review of systems elicited and pertinent information is in the HPI    Physical Exam:     Vitals:  Vitals:    09/30/24 0848   BP: (!) 140/82   Pulse: 92   Resp: 18   SpO2: 96%   Weight: 65.1 kg (143 lb 8 oz)   Height: 5' 3" (1.6 m)           General Appearance:  Alert, cooperative, no distress, appears stated age   Head:  Normocephalic, without obvious abnormality, atraumatic   Eyes:  No scleral icterus   Nose: Nares normal, septum midline   Throat: Lips, mucosa, and tongue normal   Neck: Supple, symmetrical, trachea midline, speaks with normal voice   Lungs:   respirations unlabored   Heart:  Regular rate    Abdomen:   Soft, non-tender, non-distended   Genitalia:  Deferred   Rectal:  Deferred    Extremities: Extremities normal, atraumatic, no cyanosis or edema   Pulses: 2+ and symmetric   Skin: No jaundice   Neurologic: No focal deficits        Results Reviewed:     Labs and imaging reviewed    ASSESSMENT AND PLAN:  This is a 49-year-old woman with intermittent nausea vomiting diarrhea and constipation and occasional right upper quadrant pain with normal gallbladder imaging and function.  We discussed the role of gallbladder surgery and at this time I do not think she has a strong indication for that.  I would be concerned about exposing her to the risk of even a very safe abdominal operation like cholecystectomy if we do not have clear reason for it.  She does have occasional right upper quadrant pain, but there is no correlation with abnormal gallbladder imaging and the pain is not the classic type for biliary colic.  I did recommend additional fiber supplementation and close follow-up with Gastroenterology.  I am happy to see her back any time if her symptoms worsen.  We discussed return precautions.    History, " physical exam, laboratory, and radiographic findings were reviewed.    Electronically signed by: Samuel Mcarthur      9:09 AM, 9/30/2024

## 2024-09-30 NOTE — PATIENT INSTRUCTIONS
"DIAPHRAGMATIC BREATHING     The diaphragm is a dome shaped muscle that forms the floor of the rib cage. It is the most efficient muscle for breathing and relaxation, although most people are not used to using the diaphragm. Diaphragmatic or belly breathing is an important technique to learn because it helps settle down or relax the autonomic nervous system. The correct use of diaphragmatic breathing can help to quiet brain activity resulting in the relaxation of all the muscles and organs of the body. This is accomplished by slow rhythmic breathing concentrated in the diaphragm muscle rather than the chest.    How to do proper relaxation breathing:    Start by lying on your back or reclining in a chair in a relaxed position. Place one hand on your chest and the other on your abdomen.  Relax your jaw by placing your tongue on the floor of your mouth and keeping your teeth slightly apart.   Take a deep breath in, letting the abdomen expand and rise while you keep your upper chest, neck and shoulders relaxed.   As you breathe out, allow your abdomen and chest to fall. Exhale completely.  It doesn't matter if you breathe in/out through your nose and/or mouth. Do whichever feels comfortable.  Remember to breathe slowly.  Do not force your breathing. Do not hold your breath.  Repeat for 5 minutes every day.         - relaxes the CNS  - gives your body permission to work on digestion   - "Ananya" belly - fill the tummy with air, do not let the chest rise   - practice when we feel "wound up" so you get good at it when you are on the toilet   - practice also when you are feeling good   - put the air into your belly and all the way to your pelvic floor   - YouTube video       Gastro colic reflex: morning routine  Wake up - drink some water   Go urinate if you need  Return to bed or a chair - comfortable   Abdominal massage using 3 flat fingers - ILU - 2-3 min as often as you need  Do not suppress the urge for a bowel movement " - ever  15 min after you are awake - NOTICE the urge for a bowel movement  Attempt to have BM - Put your feet on a stool - squatty potty   Relax the pelvic floor do not strain - imagine the rectum like a flower blooming    Fiber or Miralax  - can do both  - small amount then increase as you tolerate   - bowels are not moving bladder will not empty well     Water 1/2 of your body weight in water

## 2024-09-30 NOTE — PROGRESS NOTES
Pelvic Health Physical Therapy   Treatment Note     Name: Thu Juares  Clinic Number: [id number]    Therapy Diagnosis:   Encounter Diagnoses   Name Primary?    Urge incontinence of urine Yes    Bowel and bladder incontinence     Pelvic floor weakness     Mixed incontinence urge and stress      Physician: Tami Pichardo NP    Visit Date: 9/30/2024    Physician Orders: PT Eval and Treat PF PT  Medical Diagnosis from Referral: G35 (ICD-10-CM) - Multiple sclerosis N39.41 (ICD-10-CM) - Urge incontinence of urine R32,R15.9 (ICD-10-CM) - Bowel and bladder incontinence  Evaluation Date: 8/21/2024  Authorization Period Expiration: 12/31/20024  Plan of Care Expiration: 11/13/2024  Progress Note Due: 9/18/2024  Visit # 2/12 Visits authorized: 2/20   FOTO: 1/3    Cancelled Visits: 0  No Show Visits: 0    Time In: 12:14 PM   Time Out: 12:45 PM  Total Billable Time: 31 minutes    Precautions: Standard    Subjective     Pt reports: States that she is just coming off of a gallbladder attack. Had to go to the emergency room. States that 35% of gallbladder function is normal. Saw GI and general surgery. Dr. Mcarthur with general surgery. Now more constipated - has been on Zophran. This whole week has been tough. She did take Miralax today. Complaints of nausea and diarrhea. Last bowel evacuation was a week ago. Patient was seeing physical therapist for dizziness she has since been discharged.     She was compliant with home exercise program.  Response to previous treatment: none  Functional change: none    Pain in:  abdominal discomfort/10  Pain out: abdominal discomfort/10  Location:  abdominal discomfort        Objective     Thu participated in dynamic functional therapeutic activities to improve functional performance for 31  minutes, including:  Educated on gastro colic reflex and diaphragmatic breathing to assist with constipation.          Intervention Eval  09/30/2024    Neuro Re-Ed         Manual Ther         TherAct  Education      Educated on gastro colic reflex and diaphragmatic breathing to assist with constipation.           Home Exercises Provided and Patient Education Provided     Education provided:   - anatomy/physiology of pelvic floor and posture/body mechanices  Discussed progression of plan of care with patient; educated pt in activity modification; reviewed HEP with pt. Pt demonstrated and verbalized understanding of all instruction and was provided with a handout of HEP (see Patient Instructions).    Written Home Exercises Provided: yes.  Exercises were reviewed and Thu was able to demonstrate them prior to the end of the session.  Thu demonstrated good  understanding of the education provided.     See EMR under Patient Instructions for exercises provided 09/30/2024 .    Assessment     Limited by constipation   Thu Is progressing well towards her goals.   Pt prognosis is Good.     Pt will continue to benefit from skilled outpatient physical therapy to address the deficits listed in the problem list box on initial evaluation, provide pt/family education and to maximize pt's level of independence in the home and community environment.     Pt's spiritual, cultural and educational needs considered and pt agreeable to plan of care and goals.     Anticipated barriers to physical therapy: none     Short Term Goals: 6 weeks   Patient will deny straining for bowel movements.  Patient will deny fecal incontinence.   Patient will be independent with pelvic floor relaxation to improve bowel evacuation.   Patient will be able to demonstrate improved pelvic floor relaxation and contraction on rehabilitative ultrasound.      Long Term Goals: 12 weeks   Patient will report bristol stool type 4 for bowel movements.   Patient will deny abdominal pain.   Patient will be independent with core and pelvic floor awareness and relaxation.  Patient will demonstrate adequate pelvic floor coordination with contraction and relaxation on  sEMG vs rehabilitative ultrasound to allow for improved bowel habits.     Plan     Needs PF exam.     Shmuel Saul, PT

## 2024-10-04 ENCOUNTER — LAB VISIT (OUTPATIENT)
Dept: LAB | Facility: HOSPITAL | Age: 49
End: 2024-10-04
Payer: COMMERCIAL

## 2024-10-04 DIAGNOSIS — R15.9 BOWEL AND BLADDER INCONTINENCE: ICD-10-CM

## 2024-10-04 DIAGNOSIS — R11.2 NAUSEA AND VOMITING, UNSPECIFIED VOMITING TYPE: ICD-10-CM

## 2024-10-04 DIAGNOSIS — R19.7 DIARRHEA, UNSPECIFIED TYPE: ICD-10-CM

## 2024-10-04 DIAGNOSIS — R32 BOWEL AND BLADDER INCONTINENCE: ICD-10-CM

## 2024-10-04 LAB
ASTROVIRUS: NOT DETECTED
C COLI+JEJ+UPSA DNA STL QL NAA+NON-PROBE: NOT DETECTED
CYCLOSPORA CAYETANENSIS: NOT DETECTED
ENTEROAGGREGATIVE E COLI: NOT DETECTED
ENTEROPATHOGENIC E COLI: NOT DETECTED
GPP - ADENOVIRUS 40/41: NOT DETECTED
GPP - CRYPTOSPORIDIUM: NOT DETECTED
GPP - ENTAMOEBA HISTOLYTICA: NOT DETECTED
GPP - ENTEROTOXIGENIC E COLI (ETEC): NOT DETECTED
GPP - GIARDIA LAMBLIA: NOT DETECTED
GPP - NOROVIRUS GI/GII: NOT DETECTED
GPP - ROTAVIRUS A: NOT DETECTED
GPP - SALMONELLA: NOT DETECTED
GPP - VIBRIO CHOLERA: NOT DETECTED
GPP - YERSINIA ENTEROCOLITICA: NOT DETECTED
LACTATE PLASV-SCNC: NOT DETECTED MMOL/L
PLESIOMONAS SHIGELLOIDES: NOT DETECTED
SAPOVIRUS: NOT DETECTED
SHIGELLA SP+EIEC IPAH STL QL NAA+PROBE: NOT DETECTED
VIBRIO: NOT DETECTED

## 2024-10-04 PROCEDURE — 87045 FECES CULTURE AEROBIC BACT: CPT

## 2024-10-04 PROCEDURE — 87046 STOOL CULTR AEROBIC BACT EA: CPT

## 2024-10-04 PROCEDURE — 87798 DETECT AGENT NOS DNA AMP: CPT

## 2024-10-04 PROCEDURE — 83993 ASSAY FOR CALPROTECTIN FECAL: CPT

## 2024-10-04 PROCEDURE — 87425 ROTAVIRUS AG IA: CPT

## 2024-10-04 PROCEDURE — 82705 FATS/LIPIDS FECES QUAL: CPT

## 2024-10-04 PROCEDURE — 82653 EL-1 FECAL QUANTITATIVE: CPT

## 2024-10-04 PROCEDURE — 89055 LEUKOCYTE ASSESSMENT FECAL: CPT

## 2024-10-04 PROCEDURE — 87338 HPYLORI STOOL AG IA: CPT

## 2024-10-04 PROCEDURE — 87329 GIARDIA AG IA: CPT

## 2024-10-04 PROCEDURE — 87209 SMEAR COMPLEX STAIN: CPT

## 2024-10-04 PROCEDURE — 87427 SHIGA-LIKE TOXIN AG IA: CPT | Mod: 59

## 2024-10-04 PROCEDURE — 87449 NOS EACH ORGANISM AG IA: CPT

## 2024-10-04 PROCEDURE — 87507 IADNA-DNA/RNA PROBE TQ 12-25: CPT

## 2024-10-05 ENCOUNTER — PATIENT MESSAGE (OUTPATIENT)
Dept: GASTROENTEROLOGY | Facility: CLINIC | Age: 49
End: 2024-10-05
Payer: COMMERCIAL

## 2024-10-05 LAB
RV AG STL QL IA.RAPID: NEGATIVE
WBC #/AREA STL HPF: NORMAL /[HPF]

## 2024-10-06 LAB
BACTERIA STL CULT: NORMAL
CRYPTOSP AG STL QL IA: NEGATIVE
E COLI SXT1 STL QL IA: NEGATIVE
E COLI SXT2 STL QL IA: NEGATIVE
FAT STL QL: NORMAL
G LAMBLIA AG STL QL IA: NEGATIVE
HADV DNA SERPL QL NAA+PROBE: NEGATIVE
NEUTRAL FAT STL QL: NORMAL
SPECIMEN SOURCE: NORMAL

## 2024-10-07 LAB — ELASTASE 1, FECAL: >500 MCG/G

## 2024-10-08 LAB — CALPROTECTIN STL-MCNT: 337 MCG/G

## 2024-10-09 ENCOUNTER — PATIENT MESSAGE (OUTPATIENT)
Dept: GASTROENTEROLOGY | Facility: CLINIC | Age: 49
End: 2024-10-09
Payer: COMMERCIAL

## 2024-10-09 ENCOUNTER — TELEPHONE (OUTPATIENT)
Dept: ENDOSCOPY | Facility: HOSPITAL | Age: 49
End: 2024-10-09
Payer: COMMERCIAL

## 2024-10-09 VITALS — HEIGHT: 63 IN | BODY MASS INDEX: 25.34 KG/M2 | WEIGHT: 143 LBS

## 2024-10-09 DIAGNOSIS — R19.7 DIARRHEA, UNSPECIFIED TYPE: Primary | ICD-10-CM

## 2024-10-09 DIAGNOSIS — R10.9 ABDOMINAL PAIN, UNSPECIFIED ABDOMINAL LOCATION: ICD-10-CM

## 2024-10-09 DIAGNOSIS — R11.2 NAUSEA AND VOMITING, UNSPECIFIED VOMITING TYPE: Primary | ICD-10-CM

## 2024-10-09 DIAGNOSIS — R11.2 NAUSEA AND VOMITING, UNSPECIFIED VOMITING TYPE: ICD-10-CM

## 2024-10-09 RX ORDER — ONDANSETRON 4 MG/1
4 TABLET, ORALLY DISINTEGRATING ORAL EVERY 8 HOURS PRN
Qty: 30 TABLET | Refills: 0 | Status: SHIPPED | OUTPATIENT
Start: 2024-10-09

## 2024-10-09 NOTE — TELEPHONE ENCOUNTER
"Orders as follows:  Daiana Vences, DEV  P [facility] Patients  Caller: Unspecified (Yesterday,  9:29 AM)  Procedure: EGD/Colonoscopy    Diagnosis: Diarrhea, Abdominal pain, and Nausea/Vomiting    Procedure Timing: Within 4 weeks (Urgent)    *If within 4 weeks selected, please sivan as high priority*    *If greater than 12 weeks, please select "5-12 weeks" and delay sending until 3 months prior to requested date*    Location: Any Site    Additional Scheduling Information: No scheduling concerns    Prep Specifications:Standard prep    Is the patient taking a GLP-1 Agonist:no    Have you attached a patient to this message: yes  "

## 2024-10-09 NOTE — PLAN OF CARE
Spoke to pt to schedule procedure(s) Colonoscopy/EGD       Physician to perform procedure(s) Dr. TG Randolph  Date of Procedure (s) 10/12/24  Arrival Time 10:15 AM  Time of Procedure(s) 11:15 AM   Location of Procedure(s) [place] 2nd Floor  Type of Rx Prep sent to patient: Miralax  Instructions provided to patient via [facility]    Patient was informed on the following information and verbalized understanding. Screening questionnaire reviewed with patient and complete. If procedure requires anesthesia, a responsible adult needs to be present to accompany the patient home, patient cannot drive after receiving anesthesia. Appointment details are tentative, especially check-in time. Patient will receive a prep-op call 7 days prior to confirm check-in time for procedure. If applicable the patient should contact their pharmacy to verify Rx for procedure prep is ready for pick-up. Patient was advised to call the scheduling department at [phone] if pharmacy states no Rx is available. Patient was advised to call the endoscopy scheduling department if any questions or concerns arise.      [facility] Scheduling Department

## 2024-10-09 NOTE — TELEPHONE ENCOUNTER
Spoke to pt to schedule procedure(s) Colonoscopy/EGD       Physician to perform procedure(s) Dr. TG Randolph  Date of Procedure (s) 10/12/24  Arrival Time 10:15 AM  Time of Procedure(s) 11:15 AM   Location of Procedure(s) [place] 2nd Floor  Type of Rx Prep sent to patient: Miralax  Instructions provided to patient via [facility]     Patient was informed on the following information and verbalized understanding. Screening questionnaire reviewed with patient and complete. If procedure requires anesthesia, a responsible adult needs to be present to accompany the patient home, patient cannot drive after receiving anesthesia. Appointment details are tentative, especially check-in time. Patient will receive a prep-op call 7 days prior to confirm check-in time for procedure. If applicable the patient should contact their pharmacy to verify Rx for procedure prep is ready for pick-up. Patient was advised to call the scheduling department at [phone] if pharmacy states no Rx is available. Patient was advised to call the endoscopy scheduling department if any questions or concerns arise.        [facility] Scheduling Department

## 2024-10-11 ENCOUNTER — ANESTHESIA EVENT (OUTPATIENT)
Dept: ENDOSCOPY | Facility: HOSPITAL | Age: 49
End: 2024-10-11
Payer: COMMERCIAL

## 2024-10-11 LAB — O+P STL MICRO: NORMAL

## 2024-10-11 NOTE — ANESTHESIA PREPROCEDURE EVALUATION
10/11/2024  Thu Juares is a 49 y.o., female here for EGD/colonoscopy.  Past Medical History:   Diagnosis Date    Hypothyroidism surgical for benign nodules    Multiple sclerosis      No current facility-administered medications on file prior to encounter.     Current Outpatient Medications on File Prior to Encounter   Medication Sig Dispense Refill    atorvastatin (LIPITOR) 40 MG tablet Take 40 mg by mouth once daily.      baclofen (LIORESAL) 5 mg Tab tablet Take 5mg (1 tablet) by mouth in the morning and afternoon and 10mg (2 tablets) by mouth in the evening. 360 tablet 1    colesevelam (WELCHOL) 625 mg tablet Take 3 tablets (1,875 mg total) by mouth 2 (two) times daily with meals. 540 tablet 3    ergocalciferol (VITAMIN D2) 50,000 unit Cap Take 50,000 Units by mouth every 7 days.      estradioL (ESTRACE) 0.01 % (0.1 mg/gram) vaginal cream Place 1 g vaginally once daily. 42.5 g 3    meclizine (ANTIVERT) 12.5 mg tablet Take 1 tablet (12.5 mg total) by mouth 3 (three) times daily as needed for Dizziness. 90 tablet 5    ocrelizumab (OCREVUS) 30 mg/mL Soln Every six months.      ondansetron (ZOFRAN-ODT) 4 MG TbDL Take 1 tablet (4 mg total) by mouth every 8 (eight) hours as needed (nausea). 30 tablet 0    SYNTHROID 88 mcg tablet Take 1 tablet (88 mcg total) by mouth before breakfast. 90 tablet 1           Pre-op Assessment    I have reviewed the Patient Summary Reports.    I have reviewed the NPO Status.   I have reviewed the Medications.     Review of Systems  Anesthesia Hx:  No problems with previous Anesthesia             Denies Family Hx of Anesthesia complications.    Denies Personal Hx of Anesthesia complications.                    Social:  Non-Smoker, No Alcohol Use       Hematology/Oncology:  Hematology Normal   Oncology Normal                                   EENT/Dental:  EENT/Dental Normal            Cardiovascular:  Cardiovascular Normal                                            Pulmonary:  Pulmonary Normal                       Renal/:  Renal/ Normal                 Hepatic/GI:  Hepatic/GI Normal                 Musculoskeletal:  Musculoskeletal Normal                Neurological:    Neuromuscular Disease,                                   Endocrine:   Hypothyroidism          Dermatological:  Skin Normal    Psych:  Psychiatric History                  Physical Exam  General: Well nourished, Cooperative, Alert and Oriented    Airway:  Mallampati: II   Mouth Opening: Normal  TM Distance: Normal  Tongue: Normal  Neck ROM: Normal ROM    Dental:  Intact    Chest/Lungs:  Normal Respiratory Rate        Anesthesia Plan  Type of Anesthesia, risks & benefits discussed:    Anesthesia Type: Gen Natural Airway  Intra-op Monitoring Plan: Standard ASA Monitors  Post Op Pain Control Plan: multimodal analgesia  Induction:  IV  Informed Consent: Informed consent signed with the Patient and all parties understand the risks and agree with anesthesia plan.  All questions answered.   ASA Score: 2  Day of Surgery Review of History & Physical: H&P Update referred to the surgeon/provider.    Ready For Surgery From Anesthesia Perspective.     .

## 2024-10-12 ENCOUNTER — HOSPITAL ENCOUNTER (OUTPATIENT)
Facility: HOSPITAL | Age: 49
Discharge: HOME | End: 2024-10-12
Attending: STUDENT IN AN ORGANIZED HEALTH CARE EDUCATION/TRAINING PROGRAM | Admitting: STUDENT IN AN ORGANIZED HEALTH CARE EDUCATION/TRAINING PROGRAM
Payer: COMMERCIAL

## 2024-10-12 ENCOUNTER — ANESTHESIA (OUTPATIENT)
Dept: ENDOSCOPY | Facility: HOSPITAL | Age: 49
End: 2024-10-12
Payer: COMMERCIAL

## 2024-10-12 VITALS
TEMPERATURE: 97 F | SYSTOLIC BLOOD PRESSURE: 118 MMHG | HEART RATE: 80 BPM | RESPIRATION RATE: 16 BRPM | OXYGEN SATURATION: 99 % | DIASTOLIC BLOOD PRESSURE: 74 MMHG

## 2024-10-12 DIAGNOSIS — Z12.11 SCREENING FOR COLON CANCER: ICD-10-CM

## 2024-10-12 DIAGNOSIS — R19.7 DIARRHEA, UNSPECIFIED TYPE: Primary | ICD-10-CM

## 2024-10-12 LAB
B-HCG UR QL: NEGATIVE
CTP QC/QA: YES

## 2024-10-12 PROCEDURE — D9220A PRA ANESTHESIA: Mod: ,,,

## 2024-10-12 PROCEDURE — 43239 EGD BIOPSY SINGLE/MULTIPLE: CPT | Mod: 59 | Performed by: STUDENT IN AN ORGANIZED HEALTH CARE EDUCATION/TRAINING PROGRAM

## 2024-10-12 PROCEDURE — 81025 URINE PREGNANCY TEST: CPT | Performed by: STUDENT IN AN ORGANIZED HEALTH CARE EDUCATION/TRAINING PROGRAM

## 2024-10-12 PROCEDURE — 37000009 HC ANESTHESIA EA ADD 15 MINS: Performed by: STUDENT IN AN ORGANIZED HEALTH CARE EDUCATION/TRAINING PROGRAM

## 2024-10-12 PROCEDURE — 63600175 PHARM REV CODE 636 W HCPCS

## 2024-10-12 PROCEDURE — 88305 TISSUE EXAM BY PATHOLOGIST: CPT | Mod: 59 | Performed by: PATHOLOGY

## 2024-10-12 PROCEDURE — 37000008 HC ANESTHESIA 1ST 15 MINUTES: Performed by: STUDENT IN AN ORGANIZED HEALTH CARE EDUCATION/TRAINING PROGRAM

## 2024-10-12 PROCEDURE — 45380 COLONOSCOPY AND BIOPSY: CPT | Mod: 59,,, | Performed by: STUDENT IN AN ORGANIZED HEALTH CARE EDUCATION/TRAINING PROGRAM

## 2024-10-12 PROCEDURE — 43251 EGD REMOVE LESION SNARE: CPT | Mod: ,,, | Performed by: STUDENT IN AN ORGANIZED HEALTH CARE EDUCATION/TRAINING PROGRAM

## 2024-10-12 PROCEDURE — 43251 EGD REMOVE LESION SNARE: CPT | Performed by: STUDENT IN AN ORGANIZED HEALTH CARE EDUCATION/TRAINING PROGRAM

## 2024-10-12 PROCEDURE — 45385 COLONOSCOPY W/LESION REMOVAL: CPT | Performed by: STUDENT IN AN ORGANIZED HEALTH CARE EDUCATION/TRAINING PROGRAM

## 2024-10-12 PROCEDURE — 27201089 HC SNARE, DISP (ANY): Performed by: STUDENT IN AN ORGANIZED HEALTH CARE EDUCATION/TRAINING PROGRAM

## 2024-10-12 PROCEDURE — 43239 EGD BIOPSY SINGLE/MULTIPLE: CPT | Mod: 59,,, | Performed by: STUDENT IN AN ORGANIZED HEALTH CARE EDUCATION/TRAINING PROGRAM

## 2024-10-12 PROCEDURE — 45385 COLONOSCOPY W/LESION REMOVAL: CPT | Mod: ,,, | Performed by: STUDENT IN AN ORGANIZED HEALTH CARE EDUCATION/TRAINING PROGRAM

## 2024-10-12 PROCEDURE — 45380 COLONOSCOPY AND BIOPSY: CPT | Mod: 59 | Performed by: STUDENT IN AN ORGANIZED HEALTH CARE EDUCATION/TRAINING PROGRAM

## 2024-10-12 PROCEDURE — 88305 TISSUE EXAM BY PATHOLOGIST: CPT | Mod: 26,,, | Performed by: PATHOLOGY

## 2024-10-12 RX ORDER — PROPOFOL 10 MG/ML
VIAL (ML) INTRAVENOUS
Status: DISCONTINUED | OUTPATIENT
Start: 2024-10-12 | End: 2024-10-12

## 2024-10-12 RX ORDER — LIDOCAINE HYDROCHLORIDE 20 MG/ML
INJECTION INTRAVENOUS
Status: DISCONTINUED | OUTPATIENT
Start: 2024-10-12 | End: 2024-10-12

## 2024-10-12 RX ADMIN — PROPOFOL 100 MG: 10 INJECTION, EMULSION INTRAVENOUS at 11:10

## 2024-10-12 RX ADMIN — LIDOCAINE HYDROCHLORIDE 100 MG: 20 INJECTION INTRAVENOUS at 11:10

## 2024-10-12 RX ADMIN — PROPOFOL 200 MCG/KG/MIN: 10 INJECTION, EMULSION INTRAVENOUS at 11:10

## 2024-10-12 NOTE — PROVATION PATIENT INSTRUCTIONS
Discharge Summary/Instructions after an Endoscopic Procedure  Patient Name: Thu Juares  Patient MRN: [id number]  Patient YOB: 1975  Saturday, October 12, 2024  America Randolph MD  Dear patient,  As a result of recent federal legislation (The Federal Cures Act), you may   receive lab or pathology results from your procedure in your [facility]   account before your physician is able to contact you. Your physician or   their representative will relay the results to you with their   recommendations at their soonest availability.  Thank you,  RESTRICTIONS:  During your procedure today, you received medications for sedation.  These   medications may affect your judgment, balance and coordination.  Therefore,   for 24 hours, you have the following restrictions:   - DO NOT drive a car, operate machinery, make legal/financial decisions,   sign important papers or drink alcohol.    ACTIVITY:  Today: no heavy lifting, straining or running due to procedural   sedation/anesthesia.  The following day: return to full activity including work.  DIET:  Eat and drink normally unless instructed otherwise.     TREATMENT FOR COMMON SIDE EFFECTS:  - Mild abdominal pain, nausea, belching, bloating or excessive gas:  rest,   eat lightly and use a heating pad.  - Sore Throat: treat with throat lozenges and/or gargle with warm salt   water.  - Because air was used during the procedure, expelling large amounts of air   from your rectum or belching is normal.  - If a bowel prep was taken, you may not have a bowel movement for 1-3 days.    This is normal.  SYMPTOMS TO WATCH FOR AND REPORT TO YOUR PHYSICIAN:  1. Abdominal pain or bloating, other than gas cramps.  2. Chest pain.  3. Back pain.  4. Signs of infection such as: chills or fever occurring within 24 hours   after the procedure.  5. Rectal bleeding, which would show as bright red, maroon, or black stools.   (A tablespoon of blood from the rectum is not serious, especially  if   hemorrhoids are present.)  6. Vomiting.  7. Weakness or dizziness.  GO DIRECTLY TO THE NEAREST EMERGENCY ROOM IF YOU HAVE ANY OF THE FOLLOWING:      Difficulty breathing              Chills and/or fever over 101 F   Persistent vomiting and/or vomiting blood   Severe abdominal pain   Severe chest pain   Black, tarry stools   Bleeding- more than one tablespoon   Any other symptom or condition that you feel may need urgent attention  Your doctor recommends these additional instructions:  If any biopsies were taken, your doctors clinic will contact you in 1 to 2   weeks with any results.  - Patient has a contact number available for emergencies.  The signs and   symptoms of potential delayed complications were discussed with the   patient.  Return to normal activities tomorrow.  Written discharge   instructions were provided to the patient.   - Discharge patient to home.   - Resume previous diet.   - Continue present medications.   - Await pathology results.   - Perform a colonoscopy today.  For questions, problems or results please call your physician - America Randolph MD at Work:  [phone].  [facility], EMERGENCY ROOM PHONE NUMBER: [phone]  IF A COMPLICATION OR EMERGENCY SITUATION ARISES AND YOU ARE UNABLE TO REACH   YOUR PHYSICIAN - GO DIRECTLY TO THE EMERGENCY ROOM.  MD America Smith MD  10/12/2024 11:40:32 AM  This report has been verified and signed electronically.  Dear patient,  As a result of recent federal legislation (The Federal Cures Act), you may   receive lab or pathology results from your procedure in your [facility]   account before your physician is able to contact you. Your physician or   their representative will relay the results to you with their   recommendations at their soonest availability.  Thank you,  PROVATION

## 2024-10-12 NOTE — PLAN OF CARE
Pt in preop bay 9, VSS and IV inserted. Pt denies any open wounds on body or the use of any weight loss injections. Pt needs procedural consents, otherwise ready to roll.

## 2024-10-12 NOTE — H&P
Endoscopy History and Physical    PCP - No, Primary Doctor    Procedure - EGD/colonoscopy  ASA - per anesthesia  Mallampati - per anesthesia  Plan of anesthesia - MAC    HPI:  This is a 49 y.o. female here for evaluation of : abdominal pain, diarrhea, incontinence, elevated calprotectin  ROS:  Constitutional: No fevers, chills  CV: No chest pain  Pulm: No cough  Ophtho: No vision changes  GI: see HPI  Derm: No rash    Medical History:  has a past medical history of Hypothyroidism (surgical for benign nodules) and Multiple sclerosis.    Surgical History:  has a past surgical history that includes  section (10/2013).    Family History: family history includes Breast cancer in her sister.. Otherwise no colon cancer, inflammatory bowel disease, or GI malignancies.    Social History:  reports that she has never smoked. She has quit using smokeless tobacco. She reports that she does not currently use alcohol. She reports that she does not use drugs.    Review of patient's allergies indicates:   Allergen Reactions    Natalizumab        Medications:   Medications Prior to Admission   Medication Sig Dispense Refill Last Dose/Taking    atorvastatin (LIPITOR) 40 MG tablet Take 40 mg by mouth once daily.       baclofen (LIORESAL) 5 mg Tab tablet Take 5mg (1 tablet) by mouth in the morning and afternoon and 10mg (2 tablets) by mouth in the evening. 360 tablet 1     colesevelam (WELCHOL) 625 mg tablet Take 3 tablets (1,875 mg total) by mouth 2 (two) times daily with meals. 540 tablet 3     ergocalciferol (VITAMIN D2) 50,000 unit Cap Take 50,000 Units by mouth every 7 days.       estradioL (ESTRACE) 0.01 % (0.1 mg/gram) vaginal cream Place 1 g vaginally once daily. 42.5 g 3     meclizine (ANTIVERT) 12.5 mg tablet Take 1 tablet (12.5 mg total) by mouth 3 (three) times daily as needed for Dizziness. 90 tablet 5     ocrelizumab (OCREVUS) 30 mg/mL Soln Every six months.       ondansetron (ZOFRAN-ODT) 4 MG TbDL Take 1 tablet (4  mg total) by mouth every 8 (eight) hours as needed (nausea). 30 tablet 0     SYNTHROID 88 mcg tablet Take 1 tablet (88 mcg total) by mouth before breakfast. 90 tablet 1          Vital Signs: There were no vitals filed for this visit.    General Appearance: Well appearing in no acute distress  Eyes:    No scleral icterus  ENT: atraumatic  Abdomen: Soft, nondistended  Extremities: no tenderness  Skin: normal color    Labs:  Lab Results   Component Value Date    WBC 16.93 (H) 09/23/2024    HGB 15.4 09/23/2024    HCT 43.9 09/23/2024     09/23/2024    CHOL 197 04/20/2015    TRIG 250 (H) 04/20/2015    HDL 39 (L) 04/20/2015    ALT 16 09/23/2024    AST 18 09/23/2024     09/23/2024    K 4.1 09/23/2024     09/23/2024    CREATININE 0.8 09/23/2024    BUN 17 09/23/2024    CO2 25 09/23/2024    TSH 0.265 (L) 09/18/2024       I have explained the risks and benefits of endoscopy procedures to the patient/their POA including but not limited to bleeding, perforation, infection, and death.  The patient/POA was asked if they understand and allowed to ask any further questions to their satisfaction.    America Randolph MD

## 2024-10-12 NOTE — TRANSFER OF CARE
Anesthesia Transfer of Care Note    Patient: Thu Juares    Procedure(s) Performed: Procedure(s) (LRB):  EGD (ESOPHAGOGASTRODUODENOSCOPY) (N/A)  COLONOSCOPY (N/A)    Patient location: GI    Anesthesia Type: general    Transport from OR: Transported from OR on room air with adequate spontaneous ventilation    Post pain: adequate analgesia    Post assessment: no apparent anesthetic complications    Post vital signs: stable    Level of consciousness: sedated    Nausea/Vomiting: no nausea/vomiting    Complications: none    Transfer of care protocol was followed      Last vitals: Visit Vitals  BP (!) 127/92   Pulse 87   Temp 36.6 °C (97.9 °F) (Temporal)   Resp 16   LMP 09/17/2024 (Approximate)   SpO2 100%   Breastfeeding No

## 2024-10-12 NOTE — PROVATION PATIENT INSTRUCTIONS
Discharge Summary/Instructions after an Endoscopic Procedure  Patient Name: Thu Juares  Patient MRN: [id number]  Patient YOB: 1975  Saturday, October 12, 2024  America Randolph MD  Dear patient,  As a result of recent federal legislation (The Federal Cures Act), you may   receive lab or pathology results from your procedure in your [facility]   account before your physician is able to contact you. Your physician or   their representative will relay the results to you with their   recommendations at their soonest availability.  Thank you,  RESTRICTIONS:  During your procedure today, you received medications for sedation.  These   medications may affect your judgment, balance and coordination.  Therefore,   for 24 hours, you have the following restrictions:   - DO NOT drive a car, operate machinery, make legal/financial decisions,   sign important papers or drink alcohol.    ACTIVITY:  Today: no heavy lifting, straining or running due to procedural   sedation/anesthesia.  The following day: return to full activity including work.  DIET:  Eat and drink normally unless instructed otherwise.     TREATMENT FOR COMMON SIDE EFFECTS:  - Mild abdominal pain, nausea, belching, bloating or excessive gas:  rest,   eat lightly and use a heating pad.  - Sore Throat: treat with throat lozenges and/or gargle with warm salt   water.  - Because air was used during the procedure, expelling large amounts of air   from your rectum or belching is normal.  - If a bowel prep was taken, you may not have a bowel movement for 1-3 days.    This is normal.  SYMPTOMS TO WATCH FOR AND REPORT TO YOUR PHYSICIAN:  1. Abdominal pain or bloating, other than gas cramps.  2. Chest pain.  3. Back pain.  4. Signs of infection such as: chills or fever occurring within 24 hours   after the procedure.  5. Rectal bleeding, which would show as bright red, maroon, or black stools.   (A tablespoon of blood from the rectum is not serious, especially  if   hemorrhoids are present.)  6. Vomiting.  7. Weakness or dizziness.  GO DIRECTLY TO THE NEAREST EMERGENCY ROOM IF YOU HAVE ANY OF THE FOLLOWING:      Difficulty breathing              Chills and/or fever over 101 F   Persistent vomiting and/or vomiting blood   Severe abdominal pain   Severe chest pain   Black, tarry stools   Bleeding- more than one tablespoon   Any other symptom or condition that you feel may need urgent attention  Your doctor recommends these additional instructions:  If any biopsies were taken, your doctors clinic will contact you in 1 to 2   weeks with any results.  - Patient has a contact number available for emergencies.  The signs and   symptoms of potential delayed complications were discussed with the   patient.  Return to normal activities tomorrow.  Written discharge   instructions were provided to the patient.   - Discharge patient to home.   - Resume previous diet.   - Continue present medications.   - Await pathology results.   - Repeat colonoscopy in 5 years for screening purposes.  For questions, problems or results please call your physician - America Randolph MD at Work:  [phone].  [facility], EMERGENCY ROOM PHONE NUMBER: [phone]  IF A COMPLICATION OR EMERGENCY SITUATION ARISES AND YOU ARE UNABLE TO REACH   YOUR PHYSICIAN - GO DIRECTLY TO THE EMERGENCY ROOM.  MD America Smith MD  10/12/2024 11:45:56 AM  This report has been verified and signed electronically.  Dear patient,  As a result of recent federal legislation (The Federal Cures Act), you may   receive lab or pathology results from your procedure in your [facility]   account before your physician is able to contact you. Your physician or   their representative will relay the results to you with their   recommendations at their soonest availability.  Thank you,  PROVATION

## 2024-10-12 NOTE — ANESTHESIA POSTPROCEDURE EVALUATION
Anesthesia Post Evaluation    Patient: Thu Juares    Procedure(s) Performed: Procedure(s) (LRB):  EGD (ESOPHAGOGASTRODUODENOSCOPY) (N/A)  COLONOSCOPY (N/A)    Final Anesthesia Type: general      Patient location during evaluation: PACU  Patient participation: Yes- Able to Participate  Level of consciousness: awake and alert  Post-procedure vital signs: reviewed and stable  Pain management: adequate  Airway patency: patent    PONV status at discharge: No PONV  Anesthetic complications: no      Cardiovascular status: blood pressure returned to baseline  Respiratory status: unassisted  Hydration status: euvolemic  Follow-up not needed.              Vitals Value Taken Time   /74 10/12/24 1205   Temp 36.3 °C (97.3 °F) 10/12/24 1143   Pulse 80 10/12/24 1205   Resp 16 10/12/24 1205   SpO2 99 % 10/12/24 1205         Event Time   Out of Recovery 11:58:00         Pain/Jeanna Score: Jeanna Score: 10 (10/12/2024 11:58 AM)

## 2024-10-12 NOTE — PLAN OF CARE
Pt arrived to recovery dosc via stretcher per ENDO team. Bedside report received. Pt attached to bedside monitor. VSS. Pt resting without distress post procedure. Pt on room air; oxygen sats 98%. Pt IV access clean, dry and intact. Safety maintained..

## 2024-10-15 LAB
COMMENT: NORMAL
FINAL PATHOLOGIC DIAGNOSIS: NORMAL
GROSS: NORMAL
Lab: NORMAL

## 2024-10-17 ENCOUNTER — PATIENT MESSAGE (OUTPATIENT)
Dept: GASTROENTEROLOGY | Facility: CLINIC | Age: 49
End: 2024-10-17
Payer: COMMERCIAL

## 2024-10-18 ENCOUNTER — PATIENT MESSAGE (OUTPATIENT)
Dept: GASTROENTEROLOGY | Facility: CLINIC | Age: 49
End: 2024-10-18
Payer: COMMERCIAL

## 2024-10-21 ENCOUNTER — CLINICAL SUPPORT (OUTPATIENT)
Dept: REHABILITATION | Facility: HOSPITAL | Age: 49
End: 2024-10-21
Payer: COMMERCIAL

## 2024-10-21 DIAGNOSIS — N81.89 PELVIC FLOOR WEAKNESS: ICD-10-CM

## 2024-10-21 DIAGNOSIS — N39.46 MIXED INCONTINENCE URGE AND STRESS: ICD-10-CM

## 2024-10-21 DIAGNOSIS — N39.41 URGE INCONTINENCE OF URINE: Primary | ICD-10-CM

## 2024-10-21 DIAGNOSIS — R32 BOWEL AND BLADDER INCONTINENCE: ICD-10-CM

## 2024-10-21 DIAGNOSIS — R15.9 BOWEL AND BLADDER INCONTINENCE: ICD-10-CM

## 2024-10-21 PROCEDURE — 97530 THERAPEUTIC ACTIVITIES: CPT | Mod: PN | Performed by: PHYSICAL THERAPIST

## 2024-10-21 NOTE — PROGRESS NOTES
Pelvic Health Physical Therapy   Treatment Note and Progress Note     Name: Thu Juares  Clinic Number: [id number]    Therapy Diagnosis:   Encounter Diagnoses   Name Primary?    Urge incontinence of urine Yes    Bowel and bladder incontinence     Mixed incontinence urge and stress     Pelvic floor weakness      Physician: Tami Pichardo NP    Visit Date: 10/21/2024    Physician Orders: PT Eval and Treat PF PT  Medical Diagnosis from Referral: G35 (ICD-10-CM) - Multiple sclerosis N39.41 (ICD-10-CM) - Urge incontinence of urine R32,R15.9 (ICD-10-CM) - Bowel and bladder incontinence  Evaluation Date: 8/21/2024  Authorization Period Expiration: 12/31/20024  Plan of Care Expiration: 11/13/2024  Progress Note Due: 11/21/2024  Visit # 3/12 Visits authorized: 3/20   FOTO: 2/3    Cancelled Visits: 0  No Show Visits: 0    Time In: 1:49 PM   Time Out: 2:39 PM   Total Billable Time: 50  minutes    Precautions: Standard    Subjective     Pt reports: Had an upper and lower GI done. Was diagnosed with IBS. Has not thrown up in 2 weeks. Scared to eat anything. Will throw up 1-2 hours after event drinking water. It is not all the time. Has needed 3 enema's and could not have a bowel movement. Resulted to exlax. Had a bowel movement today on her own. Not drinking due to vomiting. Nocturia is better, but not hydrated.       She was compliant with home exercise program.  Response to previous treatment: none  Functional change: none    Pain in:  abdominal discomfort/10  Pain out: abdominal discomfort/10  Location:  abdominal discomfort        Objective     Thu participated in dynamic functional therapeutic activities to improve functional performance for 50 minutes, including:Educated on managing nausea in other ways than Zophran due to constipation. Use Zophran per MD recommendations if nausea is really bad. Needs to increase diet with bland foods and small amounts. Needs to contact neurologist or company for her MS meds for  further guidance on her symptoms and determine if things are improving or not. Her infusion was in August and s/s started in September.          Intervention Eval  09/30/2024 10/21/2024    Neuro Re-Ed         Manual Ther         TherAct Education      Educated on gastro colic reflex and diaphragmatic breathing to assist with constipation.    Educated on managing nausea in other ways than Zophran due to constipation. Use Zophran per MD recommendations if nausea is really bad. Needs to increase diet with bland foods and small amounts. Needs to contact neurologist or company for her MS meds for further guidance on her symptoms and determine if things are improving or not. Her infusion was in August and s/s started in September.          Home Exercises Provided and Patient Education Provided     Education provided:   - anatomy/physiology of pelvic floor and posture/body mechanices  Discussed progression of plan of care with patient; educated pt in activity modification; reviewed HEP with pt. Pt demonstrated and verbalized understanding of all instruction and was provided with a handout of HEP (see Patient Instructions).    Written Home Exercises Provided: yes.  Exercises were reviewed and Thu was able to demonstrate them prior to the end of the session.  Thu demonstrated good  understanding of the education provided.     See EMR under Patient Instructions for exercises provided 10/21/2024 .    Assessment     Limited by nausea, vomiting, and diarrhea.     Thu Is progressing well towards her goals.   Pt prognosis is Good.     Pt will continue to benefit from skilled outpatient physical therapy to address the deficits listed in the problem list box on initial evaluation, provide pt/family education and to maximize pt's level of independence in the home and community environment.     Pt's spiritual, cultural and educational needs considered and pt agreeable to plan of care and goals.     Anticipated barriers to  physical therapy: none     Short Term Goals: 6 weeks   Patient will deny straining for bowel movements.  Patient will deny fecal incontinence.   Patient will be independent with pelvic floor relaxation to improve bowel evacuation.   Patient will be able to demonstrate improved pelvic floor relaxation and contraction on rehabilitative ultrasound.      Long Term Goals: 12 weeks   Patient will report bristol stool type 4 for bowel movements.   Patient will deny abdominal pain.   Patient will be independent with core and pelvic floor awareness and relaxation.  Patient will demonstrate adequate pelvic floor coordination with contraction and relaxation on sEMG vs rehabilitative ultrasound to allow for improved bowel habits.     Plan     Needs PF exam. Decrease frequency to EOW - needs to get GI system in better control and be able to increase her diet. Will come in in 2 weeks if feeling better, if not will decrease frequency.     Shmuel Saul, PT

## 2024-10-22 ENCOUNTER — PATIENT MESSAGE (OUTPATIENT)
Dept: RESEARCH | Facility: HOSPITAL | Age: 49
End: 2024-10-22
Payer: COMMERCIAL

## 2024-10-22 ENCOUNTER — PATIENT MESSAGE (OUTPATIENT)
Dept: NEUROLOGY | Facility: CLINIC | Age: 49
End: 2024-10-22
Payer: COMMERCIAL

## 2024-10-30 ENCOUNTER — TELEPHONE (OUTPATIENT)
Dept: GASTROENTEROLOGY | Facility: CLINIC | Age: 49
End: 2024-10-30
Payer: COMMERCIAL

## 2024-10-30 ENCOUNTER — TELEPHONE (OUTPATIENT)
Dept: ENDOSCOPY | Facility: HOSPITAL | Age: 49
End: 2024-10-30
Payer: COMMERCIAL

## 2024-11-04 ENCOUNTER — CLINICAL SUPPORT (OUTPATIENT)
Dept: REHABILITATION | Facility: HOSPITAL | Age: 49
End: 2024-11-04
Payer: COMMERCIAL

## 2024-11-04 DIAGNOSIS — N39.41 URGE INCONTINENCE OF URINE: Primary | ICD-10-CM

## 2024-11-04 DIAGNOSIS — R32 BOWEL AND BLADDER INCONTINENCE: ICD-10-CM

## 2024-11-04 DIAGNOSIS — R15.9 BOWEL AND BLADDER INCONTINENCE: ICD-10-CM

## 2024-11-04 DIAGNOSIS — N39.46 MIXED INCONTINENCE URGE AND STRESS: ICD-10-CM

## 2024-11-04 DIAGNOSIS — N81.89 PELVIC FLOOR WEAKNESS: ICD-10-CM

## 2024-11-04 PROCEDURE — 97112 NEUROMUSCULAR REEDUCATION: CPT | Mod: PN | Performed by: PHYSICAL THERAPIST

## 2024-11-04 PROCEDURE — 97530 THERAPEUTIC ACTIVITIES: CPT | Mod: PN | Performed by: PHYSICAL THERAPIST

## 2024-11-04 NOTE — PROGRESS NOTES
Pelvic Health Physical Therapy   Treatment Note      Name: Thu Juares  Clinic Number: [id number]    Therapy Diagnosis:   Encounter Diagnoses   Name Primary?    Urge incontinence of urine Yes    Bowel and bladder incontinence     Mixed incontinence urge and stress     Pelvic floor weakness      Physician: Tami Pichardo NP    Visit Date: 11/4/2024    Physician Orders: PT Eval and Treat PF PT  Medical Diagnosis from Referral: G35 (ICD-10-CM) - Multiple sclerosis N39.41 (ICD-10-CM) - Urge incontinence of urine R32,R15.9 (ICD-10-CM) - Bowel and bladder incontinence  Evaluation Date: 8/21/2024  Authorization Period Expiration: 12/31/20024  Plan of Care Expiration: 11/13/2024  Progress Note Due: 11/21/2024  Visit # 4/12 Visits authorized: 4/20   FOTO: 2/3    Cancelled Visits: 0  No Show Visits: 0    Time In: 11:06 AM   Time Out: 11:48 AM   Total Billable Time: 42  minutes    Precautions: Standard    Subjective     Pt reports: Talked with neurologist - not many people with colitis with that medication. Seeking another opinion for GI. She is able to eat. Feels like she had a gallbladder attack. It is now better. Thinks she has a UTI. Was miserable Saturday night - did nothing. Symptoms were vaginal itching, urgency, burning when she would urinate. She did not go to urgent care.     Hard to have a bowel movement. Took one Zophran last week. Bowels are not moving well.     She was compliant with home exercise program.  Response to previous treatment: none  Functional change: none    Pain in:  abdominal discomfort/10  Pain out: abdominal discomfort/10  Location:  abdominal discomfort        Objective     Thu participated in dynamic functional therapeutic activities to improve functional performance for 27 minutes, including: Educated on bowel habits with abdominal massage to assist. Encouraged to be assessed for UTI and take her medication for bladder s/s.     Thu participated in neuromuscular re-education  activities to develop Coordination, Down training, and Proprioception for 15 minutes including:   Lower abdominal rehabilitative ultrasound with education on pelvic floor down training and awareness.         Intervention Eval  09/30/2024 10/21/2024  11/04/2024    Neuro Re-Ed       Lower abdominal rehabilitative ultrasound with education on pelvic floor down training and awareness.     Manual Ther          TherAct Education      Educated on gastro colic reflex and diaphragmatic breathing to assist with constipation.    Educated on managing nausea in other ways than Zophran due to constipation. Use Zophran per MD recommendations if nausea is really bad. Needs to increase diet with bland foods and small amounts. Needs to contact neurologist or company for her MS meds for further guidance on her symptoms and determine if things are improving or not. Her infusion was in August and s/s started in September.    Educated on bowel habits with abdominal massage to assist. Encouraged to be assessed for UTI and take her medication for bladder s/s.        Home Exercises Provided and Patient Education Provided     Education provided:   - anatomy/physiology of pelvic floor and posture/body mechanices  Discussed progression of plan of care with patient; educated pt in activity modification; reviewed HEP with pt. Pt demonstrated and verbalized understanding of all instruction and was provided with a handout of HEP (see Patient Instructions).    Written Home Exercises Provided: yes.  Exercises were reviewed and Thu was able to demonstrate them prior to the end of the session.  Thu demonstrated good  understanding of the education provided.     See EMR under Patient Instructions for exercises provided 11/04/2024 .    Assessment     Limited by burning with urination, urgency and frequency.     Thu Is progressing well towards her goals.   Pt prognosis is Good.     Pt will continue to benefit from skilled outpatient physical  therapy to address the deficits listed in the problem list box on initial evaluation, provide pt/family education and to maximize pt's level of independence in the home and community environment.     Pt's spiritual, cultural and educational needs considered and pt agreeable to plan of care and goals.     Anticipated barriers to physical therapy: none     Short Term Goals: 6 weeks   Patient will deny straining for bowel movements.  Patient will deny fecal incontinence.   Patient will be independent with pelvic floor relaxation to improve bowel evacuation.   Patient will be able to demonstrate improved pelvic floor relaxation and contraction on rehabilitative ultrasound.      Long Term Goals: 12 weeks   Patient will report bristol stool type 4 for bowel movements.   Patient will deny abdominal pain.   Patient will be independent with core and pelvic floor awareness and relaxation.  Patient will demonstrate adequate pelvic floor coordination with contraction and relaxation on sEMG vs rehabilitative ultrasound to allow for improved bowel habits.     Plan     Needs PF exam and perineal rehabilitative ultrasound, monitor bladder symptoms, UTI?.   Shmuel Saul, PT

## 2024-11-04 NOTE — PATIENT INSTRUCTIONS
Gastro colic reflex: morning routine  Wake up - drink some water   Go urinate if you need  Return to bed or a chair - comfortable   Abdominal massage using 3 flat fingers - ILU - 2-3 min as often as you need  Do not suppress the urge for a bowel movement - ever  15 min after you are awake - NOTICE the urge for a bowel movement  Attempt to have BM - Put your feet on a stool  Relax the pelvic floor do not strain - imagine the rectum like a flower blooming     Pelvic floor relaxation: gentle feeling of down and out  - occurs when urination, bowel movement, vaginal penetration, or passing gas  - every hour - tune into your pelvic floor and relax   - do not hold tension in the hips  - with breathing - let the diaphragm descend and the pelvic floor descend       Stay hydrated - 1/2 oz of water per pound of body weight

## 2024-11-05 ENCOUNTER — PATIENT MESSAGE (OUTPATIENT)
Dept: UROGYNECOLOGY | Facility: CLINIC | Age: 49
End: 2024-11-05
Payer: COMMERCIAL

## 2024-11-06 ENCOUNTER — PATIENT MESSAGE (OUTPATIENT)
Dept: REHABILITATION | Facility: HOSPITAL | Age: 49
End: 2024-11-06
Payer: COMMERCIAL

## 2024-11-11 ENCOUNTER — CLINICAL SUPPORT (OUTPATIENT)
Dept: REHABILITATION | Facility: HOSPITAL | Age: 49
End: 2024-11-11
Payer: COMMERCIAL

## 2024-11-11 DIAGNOSIS — N39.46 MIXED INCONTINENCE URGE AND STRESS: ICD-10-CM

## 2024-11-11 DIAGNOSIS — N39.41 URGE INCONTINENCE OF URINE: Primary | ICD-10-CM

## 2024-11-11 DIAGNOSIS — R15.9 BOWEL AND BLADDER INCONTINENCE: ICD-10-CM

## 2024-11-11 DIAGNOSIS — N81.89 PELVIC FLOOR WEAKNESS: ICD-10-CM

## 2024-11-11 DIAGNOSIS — R32 BOWEL AND BLADDER INCONTINENCE: ICD-10-CM

## 2024-11-11 PROCEDURE — 97530 THERAPEUTIC ACTIVITIES: CPT | Mod: PN | Performed by: PHYSICAL THERAPIST

## 2024-11-11 PROCEDURE — 97112 NEUROMUSCULAR REEDUCATION: CPT | Mod: PN | Performed by: PHYSICAL THERAPIST

## 2024-11-11 NOTE — PROGRESS NOTES
Pelvic Health Physical Therapy   Treatment Note      Name: Thu Juares  Clinic Number: [id number]    Therapy Diagnosis:   Encounter Diagnoses   Name Primary?    Urge incontinence of urine Yes    Bowel and bladder incontinence     Mixed incontinence urge and stress     Pelvic floor weakness      Physician: Tami Pichardo NP    Visit Date: 11/11/2024    Physician Orders: PT Eval and Treat PF PT  Medical Diagnosis from Referral: G35 (ICD-10-CM) - Multiple sclerosis N39.41 (ICD-10-CM) - Urge incontinence of urine R32,R15.9 (ICD-10-CM) - Bowel and bladder incontinence  Evaluation Date: 8/21/2024  Authorization Period Expiration: 12/31/20024  Plan of Care Expiration: 11/13/2024  Progress Note Due: 11/21/2024  Visit # 5/12 Visits authorized: 5/20   FOTO: 2/3    Cancelled Visits: 0  No Show Visits: 0    Time In: 1:45 PM   Time Out: 2:37 PM    Total Billable Time: 52 minutes    Precautions: Standard    Subjective     Pt reports: Went to [place] this weekend. Did not have a UTI per urgent care. Urgency is better, not gone. Bowels are not moving well. Moved yesterday. Feels very stiff in her pelvis. Has not taken her Baclophen in a week or 2.     She was compliant with home exercise program.  Response to previous treatment: none  Functional change: none    Pain in: 0/10  Pain out: 0t/10  Location:  none      Objective     Thu participated in dynamic functional therapeutic activities to improve functional performance for 10 minutes, including: Educated on using Baclofen to assist with muscle spasms as it is prescribed, she has not used it in about 2 weeks when her bladder symptoms started. Patient feels tension in the hips and has trouble walking when first getting up due to tightness.     Thu participated in neuromuscular re-education activities to develop Coordination, Down training, and Proprioception for 42 minutes including: lower extremity ROM, home exercise program provided and encouraged mobility at home.  Physical therapist assisted pt with bilateral lower extremity ROM to her tolerance.        Intervention 10/21/2024  11/04/2024  11/11/2024    Neuro Re-Ed    Lower abdominal rehabilitative ultrasound with education on pelvic floor down training and awareness.   lower extremity ROM, home exercise program provided and encouraged mobility at home. Physical therapist assisted pt with bilateral lower extremity ROM to her tolerance.    Manual Ther       TherAct Education Educated on managing nausea in other ways than Zophran due to constipation. Use Zophran per MD recommendations if nausea is really bad. Needs to increase diet with bland foods and small amounts. Needs to contact neurologist or company for her MS meds for further guidance on her symptoms and determine if things are improving or not. Her infusion was in August and s/s started in September.    Educated on bowel habits with abdominal massage to assist. Encouraged to be assessed for UTI and take her medication for bladder s/s.  Educated on using Baclofen to assist with muscle spasms as it is prescribed, she has not used it in about 2 weeks when her bladder symptoms started. Patient feels tension in the hips and has trouble walking when first getting up due to tightness.        Home Exercises Provided and Patient Education Provided     Education provided:   - anatomy/physiology of pelvic floor and posture/body mechanices  Discussed progression of plan of care with patient; educated pt in activity modification; reviewed HEP with pt. Pt demonstrated and verbalized understanding of all instruction and was provided with a handout of HEP (see Patient Instructions).    Written Home Exercises Provided: yes.  Exercises were reviewed and Thu was able to demonstrate them prior to the end of the session.  Thu demonstrated good  understanding of the education provided.     See EMR under Patient Instructions for exercises provided 11/11/2024 .    Assessment     Limited  by burning with urination, urgency and frequency. No UTI per urgent care.     Thu Is progressing well towards her goals.   Pt prognosis is Good.     Pt will continue to benefit from skilled outpatient physical therapy to address the deficits listed in the problem list box on initial evaluation, provide pt/family education and to maximize pt's level of independence in the home and community environment.     Pt's spiritual, cultural and educational needs considered and pt agreeable to plan of care and goals.     Anticipated barriers to physical therapy: none     Short Term Goals: 6 weeks   Patient will deny straining for bowel movements.  Patient will deny fecal incontinence.   Patient will be independent with pelvic floor relaxation to improve bowel evacuation.   Patient will be able to demonstrate improved pelvic floor relaxation and contraction on rehabilitative ultrasound.      Long Term Goals: 12 weeks   Patient will report bristol stool type 4 for bowel movements.   Patient will deny abdominal pain.   Patient will be independent with core and pelvic floor awareness and relaxation.  Patient will demonstrate adequate pelvic floor coordination with contraction and relaxation on sEMG vs rehabilitative ultrasound to allow for improved bowel habits.     Plan     Needs PF exam and perineal rehabilitative ultrasound, monitor bladder symptoms, UTI?.   Shmuel Saul, PT

## 2024-11-14 ENCOUNTER — TELEPHONE (OUTPATIENT)
Dept: ORTHOPEDICS | Facility: CLINIC | Age: 49
End: 2024-11-14
Payer: COMMERCIAL

## 2024-11-14 NOTE — TELEPHONE ENCOUNTER
Called and spoke to pt regarding apt with Dr Mayfield. Pt accepted apt with no further questions at this time.    ----- Message from Quirino Mayfield MD sent at 11/14/2024 10:39 AM CST -----  Regarding: RE: [facility] [[id number]]- New Patient Referral  I am happy to see the patient  ----- Message -----  From: Ngoc Patel RN  Sent: 11/14/2024   9:02 AM CST  To: Quirino Mayfield MD; [facility]  Subject: FW: [facility] [[id number]]- New Patient Ref#    Hi all,    This ortho oncology referral came over to Dr. Clarke who is no longer with us.  Can you please review and call patient to schedule if needed.    Ngoc Townsend  ----- Message -----  From: Willem Carlos  Sent: 11/8/2024   3:17 PM CST  To: Ngoc Patel RN  Subject: [facility] B[facility] [[id number]]- New Patient Referral    Akbar Bhardwaj patient referral on patient received via fax from [facility], Dr. Leoncio Nation referring.Referral has been uploaded into .    Reason: Abnormal findings on diagnostic imaging of other specified body structures.    Willem

## 2024-11-19 ENCOUNTER — CLINICAL SUPPORT (OUTPATIENT)
Dept: REHABILITATION | Facility: HOSPITAL | Age: 49
End: 2024-11-19
Payer: COMMERCIAL

## 2024-11-19 DIAGNOSIS — N81.89 PELVIC FLOOR WEAKNESS: ICD-10-CM

## 2024-11-19 DIAGNOSIS — N39.46 MIXED INCONTINENCE URGE AND STRESS: ICD-10-CM

## 2024-11-19 DIAGNOSIS — N39.41 URGE INCONTINENCE OF URINE: Primary | ICD-10-CM

## 2024-11-19 DIAGNOSIS — R15.9 BOWEL AND BLADDER INCONTINENCE: ICD-10-CM

## 2024-11-19 DIAGNOSIS — R32 BOWEL AND BLADDER INCONTINENCE: ICD-10-CM

## 2024-11-19 PROCEDURE — 97140 MANUAL THERAPY 1/> REGIONS: CPT | Performed by: PHYSICAL THERAPIST

## 2024-11-19 PROCEDURE — 97112 NEUROMUSCULAR REEDUCATION: CPT | Performed by: PHYSICAL THERAPIST

## 2024-11-19 PROCEDURE — 97530 THERAPEUTIC ACTIVITIES: CPT | Performed by: PHYSICAL THERAPIST

## 2024-11-19 NOTE — PROGRESS NOTES
Pelvic Health Physical Therapy   Treatment Note and Updated POC     Name: Thu Juares  Clinic Number: [id number]    Therapy Diagnosis:   Encounter Diagnoses   Name Primary?    Urge incontinence of urine Yes    Bowel and bladder incontinence     Pelvic floor weakness     Mixed incontinence urge and stress      Physician: Tami Pichardo NP    Visit Date: 11/19/2024    Physician Orders: PT Eval and Treat PF PT  Medical Diagnosis from Referral: G35 (ICD-10-CM) - Multiple sclerosis N39.41 (ICD-10-CM) - Urge incontinence of urine R32,R15.9 (ICD-10-CM) - Bowel and bladder incontinence  Evaluation Date: 8/21/2024  Authorization Period Expiration: 12/31/20024  Plan of Care Expiration: 2/12/2025  Progress Note Due: 12/19/2024  Visit # 6/12 Visits authorized: 6/20   FOTO: 3/3    Cancelled Visits: 0  No Show Visits: 0    Time In: 3:15 PM   Time Out: 4:13 PM   Total Billable Time: 58 minutes    Precautions: Standard    Subjective     Pt reports: Has more urgency in the morning. Not drinking much in the morning the last few days. Not doing coffee. Did have orange juice. She is back on her Baclophen. No change in urgency/frequency with hydration. Feels ok in the afternoon. Bowel are not moving well. Did move this morning, if not she would have used dulcolax.     She was compliant with home exercise program.  Response to previous treatment: none  Functional change: none    Pain in: 0/10  Pain out: 0t/10  Location:  none      Objective     Thu participated in dynamic functional therapeutic activities to improve functional performance for 10 minutes, including: Educated pelvic floor stretching at home and treating the pelvic floor like another skeletal muscle.     Thu participated in neuromuscular re-education activities to develop Coordination, Down training, and Proprioception for 20 minutes including: rehabilitative ultrasound to the perineum with PF down training and awareness. Encouraged down training with  diaphragmatic breathing.     Thu received the following manual therapy techniques:  for 10 minutes including: pt did not request a chaperone to be present.   VAGINAL PELVIC FLOOR EXAM    EXTERNAL ASSESSMENT  Introitus: stenotic  Skin condition: mildness redness noted  Scarring: none   Sensation: WNL - greater on the left  Pain:  none  Voluntary contraction: minimal lift  Voluntary relaxation: nil  Involuntary contraction: DNT  Bearing down: nil  Perineal descent: absent      INTERNAL ASSESSMENT  Pain: trigger points as follows: bilateral bulbocavernosus    Sensation: WNL  Vaginal vault: stenotic   Muscle Bulk: hypertonus   Muscle Power: 2/5     Quality of contraction: incomplete relaxation   Specificity: WNL   Coordination: WNL   Prolapse check:none          Intervention 10/21/2024  11/04/2024  11/11/2024  11/19/2024    Neuro Re-Ed    Lower abdominal rehabilitative ultrasound with education on pelvic floor down training and awareness.   lower extremity ROM, home exercise program provided and encouraged mobility at home. Physical therapist assisted pt with bilateral lower extremity ROM to her tolerance.  rehabilitative ultrasound to the perineum with PF down training and awareness. Encouraged down training with diaphragmatic breathing.    Manual Ther Vaginal exam    Hypertonus - minimal PF contraction, limited mobility with attempts at bowel evacuation.    TherAct Education Educated on managing nausea in other ways than Zophran due to constipation. Use Zophran per MD recommendations if nausea is really bad. Needs to increase diet with bland foods and small amounts. Needs to contact neurologist or company for her MS meds for further guidance on her symptoms and determine if things are improving or not. Her infusion was in August and s/s started in September.    Educated on bowel habits with abdominal massage to assist. Encouraged to be assessed for UTI and take her medication for bladder s/s.  Educated on using  Baclofen to assist with muscle spasms as it is prescribed, she has not used it in about 2 weeks when her bladder symptoms started. Patient feels tension in the hips and has trouble walking when first getting up due to tightness.  Educated pelvic floor stretching at home and treating the pelvic floor like another skeletal muscle.        Home Exercises Provided and Patient Education Provided     Education provided:   - anatomy/physiology of pelvic floor and posture/body mechanices  Discussed progression of plan of care with patient; educated pt in activity modification; reviewed HEP with pt. Pt demonstrated and verbalized understanding of all instruction and was provided with a handout of HEP (see Patient Instructions).    Written Home Exercises Provided: yes.  Exercises were reviewed and Thu was able to demonstrate them prior to the end of the session.  Thu demonstrated good  understanding of the education provided.     See EMR under Patient Instructions for exercises provided 11/19/2024 .    Assessment     Still with urgency and frequency. Limited pelvic floor mobility with vaginal exam and rehabilitative ultrasound. Sensation is intact.     Thu Is progressing well towards her goals.   Pt prognosis is Good.     Pt will continue to benefit from skilled outpatient physical therapy to address the deficits listed in the problem list box on initial evaluation, provide pt/family education and to maximize pt's level of independence in the home and community environment.     Pt's spiritual, cultural and educational needs considered and pt agreeable to plan of care and goals.     Anticipated barriers to physical therapy: none     Short Term Goals: 6 weeks   Patient will deny straining for bowel movements. Goal not met - progressing   Patient will deny fecal incontinence. Goal met 11/19/2024   Patient will be independent with pelvic floor relaxation to improve bowel evacuation. Goal not met - progressing   Patient will  be able to demonstrate improved pelvic floor relaxation and contraction on rehabilitative ultrasound. Goal not met - progressing      Long Term Goals: 12 weeks   Patient will report bristol stool type 4 for bowel movements. Goal not met - progressing   Patient will deny abdominal pain. Goal met 11/19/2024   Patient will be independent with core and pelvic floor awareness and relaxation. Goal not met - progressing   Patient will demonstrate adequate pelvic floor coordination with contraction and relaxation on sEMG vs rehabilitative ultrasound to allow for improved bowel habits. Goal not met - progressing     Plan     Continue with plan of care 1x per week 12 weeks    May consider a pelvic wand/dilator in the future. Monitor bowel evacuation. Continue with rehabilitative ultrasound for PF down training. Not emptying bowels well. May also benefit from vaginal estrogen use. Sees gyn in January 2025.     Shmuel Saul, PT

## 2024-11-22 ENCOUNTER — OFFICE VISIT (OUTPATIENT)
Dept: ORTHOPEDICS | Facility: CLINIC | Age: 49
End: 2024-11-22
Payer: COMMERCIAL

## 2024-11-22 VITALS — BODY MASS INDEX: 24.1 KG/M2 | WEIGHT: 141.13 LBS | HEIGHT: 64 IN

## 2024-11-22 DIAGNOSIS — M85.38 OSTEITIS CONDENSANS ILII: Primary | ICD-10-CM

## 2024-11-22 PROCEDURE — 99203 OFFICE O/P NEW LOW 30 MIN: CPT | Mod: S$GLB,,, | Performed by: ORTHOPAEDIC SURGERY

## 2024-11-22 PROCEDURE — 3008F BODY MASS INDEX DOCD: CPT | Mod: CPTII,S$GLB,, | Performed by: ORTHOPAEDIC SURGERY

## 2024-11-22 PROCEDURE — 1160F RVW MEDS BY RX/DR IN RCRD: CPT | Mod: CPTII,S$GLB,, | Performed by: ORTHOPAEDIC SURGERY

## 2024-11-22 PROCEDURE — 1159F MED LIST DOCD IN RCRD: CPT | Mod: CPTII,S$GLB,, | Performed by: ORTHOPAEDIC SURGERY

## 2024-11-22 PROCEDURE — 99999 PR PBB SHADOW E&M-EST. PATIENT-LVL III: CPT | Mod: PBBFAC,,, | Performed by: ORTHOPAEDIC SURGERY

## 2024-11-22 NOTE — PROGRESS NOTES
"Subjective:       Patient ID: Thu Juares is a 49 y.o. female.    Chief Complaint:   Pain of the Right Hip    History of Present Illness    CHIEF COMPLAINT:  Patient presents for evaluation of a finding on a pelvic CT performed for bladder issues.    HPI:  Patient presents for evaluation of a finding on a CT of her bladder. During the imaging study, the radiologist noticed something on her hip and recommended further evaluation. She then sought out an orthopedic specialist, who referred her for additional assessment.    She recalls an incident during a trip to [place] in  where she experienced a popping sensation in her hip area. She states that while walking, she felt a popping sensation in her hip that caused pain. Patient and her companions used Google to find stretches that seemed to help when the hip would "come out." She is unsure if the issue resolved completely after this incident.    Currently, she denies experiencing any pain in the affected area. When questioned about past pain in the lower back or buttock region, she reiterates the divorce360 incident but denies any ongoing discomfort.    She denies current pain in the hip area. She denies having a history of tumors or cancer.        Past Medical History:   Diagnosis Date    Hypothyroidism surgical for benign nodules    Multiple sclerosis      Past Surgical History:   Procedure Laterality Date     SECTION  10/2013    COLONOSCOPY N/A 10/12/2024    Procedure: COLONOSCOPY;  Surgeon: America Randolph MD;  Location: Replaced by [facility];  Service: Endoscopy;  Laterality: N/A;    ESOPHAGOGASTRODUODENOSCOPY N/A 10/12/2024    Procedure: EGD (ESOPHAGOGASTRODUODENOSCOPY);  Surgeon: America Randolph MD;  Location: Replaced by [facility] ENDOSCOPY;  Service: Endoscopy;  Laterality: N/A;  JOANA Vences/Fabrice/portal SW  10.10 case time adjusted to 60 min.; AP  10.11 precall complete; all ques answered; AP     Family History   Problem Relation Name Age of Onset    Breast " cancer Sister      Thyroid disease Neg Hx      Diabetes Neg Hx       Social History     Socioeconomic History    Marital status:    Tobacco Use    Smoking status: Never    Smokeless tobacco: Former   Substance and Sexual Activity    Alcohol use: Not Currently    Drug use: Never    Sexual activity: Yes     Partners: Male     Social Drivers of Health     Financial Resource Strain: Low Risk  (7/21/2024)    Overall Financial Resource Strain (CARDIA)     Difficulty of Paying Living Expenses: Not hard at all   Food Insecurity: No Food Insecurity (7/21/2024)    Hunger Vital Sign     Worried About Running Out of Food in the Last Year: Never true     Ran Out of Food in the Last Year: Never true   Physical Activity: Inactive (7/21/2024)    Exercise Vital Sign     Days of Exercise per Week: 0 days     Minutes of Exercise per Session: 0 min   Stress: No Stress Concern Present (7/21/2024)    [employer] - Occupational Stress Questionnaire     Feeling of Stress : Only a little   Housing Stability: Unknown (7/21/2024)    Housing Stability Vital Sign     Unable to Pay for Housing in the Last Year: No       Current Outpatient Medications   Medication Sig Dispense Refill    atorvastatin (LIPITOR) 40 MG tablet Take 40 mg by mouth once daily.      baclofen (LIORESAL) 5 mg Tab tablet Take 5mg (1 tablet) by mouth in the morning and afternoon and 10mg (2 tablets) by mouth in the evening. 360 tablet 1    ergocalciferol (VITAMIN D2) 50,000 unit Cap Take 50,000 Units by mouth every 7 days.      estradioL (ESTRACE) 0.01 % (0.1 mg/gram) vaginal cream Place 1 g vaginally once daily. 42.5 g 3    meclizine (ANTIVERT) 12.5 mg tablet Take 1 tablet (12.5 mg total) by mouth 3 (three) times daily as needed for Dizziness. 90 tablet 5    ocrelizumab (OCREVUS) 30 mg/mL Soln Every six months.      ondansetron (ZOFRAN-ODT) 4 MG TbDL Take 1 tablet (4 mg total) by mouth every 8 (eight) hours as needed (nausea). 30 tablet 0  "   SYNTHROID 88 mcg tablet Take 1 tablet (88 mcg total) by mouth before breakfast. 90 tablet 1    colesevelam (WELCHOL) 625 mg tablet Take 3 tablets (1,875 mg total) by mouth 2 (two) times daily with meals. (Patient not taking: Reported on 11/22/2024) 540 tablet 3     No current facility-administered medications for this visit.     Review of patient's allergies indicates:   Allergen Reactions    Natalizumab          Objective:      Vitals:    11/22/24 1036   Weight: 64 kg (141 lb 1.5 oz)   Height: 5' 4" (1.626 m)     Physical Exam  Negative C sign, negative Stinchfield sign.  No tenderness at the greater trochanter or iliotibial band.  No tenderness at the SI joint.  Negative SHAUN test.  The patient has no pain in the groin with passive flexion and internal rotation of the hip which is not limited.  Knee benign without tenderness or effusion, with normal range of motion.  Skin intact.  Distal neurovascular intact.  Flip test negative.      Imaging Review:   We reviewed her imaging study referenced above which shows sclerosis of bone in the ilium adjacent to the sacroiliac joint  Assessment/Plan   Unilateral osteitis condensans ilii, asymptomatic.  She is reassured that this does not represent a worrisome lesion.  She will let me know if she develops any symptoms.    The patient's pathophysiology was explained in detail with reference to x-rays, models, other visual aids as appropriate.  Treatment options were discussed in detail.  Questions were invited and answered to the patient's satisfaction.    This note was generated with the assistance of ambient listening technology. Verbal consent was obtained by the patient and accompanying visitor(s) for the recording of patient appointment to facilitate this note. I attest to having reviewed and edited the generated note for accuracy, though some syntax or spelling errors may persist. Please contact the author of this note for any clarification.     Quirino Mayfield, " MD  Orthopaedic Surgery

## 2024-11-25 ENCOUNTER — CLINICAL SUPPORT (OUTPATIENT)
Dept: REHABILITATION | Facility: HOSPITAL | Age: 49
End: 2024-11-25
Payer: COMMERCIAL

## 2024-11-25 DIAGNOSIS — R32 BOWEL AND BLADDER INCONTINENCE: ICD-10-CM

## 2024-11-25 DIAGNOSIS — N81.89 PELVIC FLOOR WEAKNESS: ICD-10-CM

## 2024-11-25 DIAGNOSIS — N39.46 MIXED INCONTINENCE URGE AND STRESS: ICD-10-CM

## 2024-11-25 DIAGNOSIS — R15.9 BOWEL AND BLADDER INCONTINENCE: ICD-10-CM

## 2024-11-25 DIAGNOSIS — N39.41 URGE INCONTINENCE OF URINE: Primary | ICD-10-CM

## 2024-11-25 PROCEDURE — 97140 MANUAL THERAPY 1/> REGIONS: CPT | Mod: PN | Performed by: PHYSICAL THERAPIST

## 2024-11-25 PROCEDURE — 97530 THERAPEUTIC ACTIVITIES: CPT | Mod: PN | Performed by: PHYSICAL THERAPIST

## 2024-11-25 NOTE — PROGRESS NOTES
Pelvic Health Physical Therapy   Treatment Note      Name: Thu Juares  Clinic Number: [id number]    Therapy Diagnosis:   Encounter Diagnoses   Name Primary?    Urge incontinence of urine Yes    Bowel and bladder incontinence     Pelvic floor weakness     Mixed incontinence urge and stress      Physician: Tami Pichardo NP    Visit Date: 11/25/2024    Physician Orders: PT Eval and Treat PF PT  Medical Diagnosis from Referral: G35 (ICD-10-CM) - Multiple sclerosis N39.41 (ICD-10-CM) - Urge incontinence of urine R32,R15.9 (ICD-10-CM) - Bowel and bladder incontinence  Evaluation Date: 8/21/2024  Authorization Period Expiration: 12/31/20024  Plan of Care Expiration: 2/12/2025  Progress Note Due: 12/19/2024  Visit # 7/12 Visits authorized: 7/20   FOTO: 3/3    Cancelled Visits: 0  No Show Visits: 0    Time In: 11:55 AM   Time Out: 12:35 PM  Total Billable Time: 40 minutes    Precautions: Standard    Subjective     Pt reports: Saturday had urgency of urine with difficulty emptying her bladder. Had some pain. Tested her and felt as though she had a UTI. Took antibiotics for a couple of days - felt better after 1 antibiotic Saturday morning. Forgot last night and this morning. Bowels did not move yesterday. Saturday had some hard stool. Did move today. Cannot empty her bowels. Went again here, not fully emptying. Saturday she went out and did not take her Baclophen. Took 3 last night. No change in her bowels after last visit. Did her abdominal massage and breathing.     She was compliant with home exercise program.  Response to previous treatment: none  Functional change: none    Pain in: 0/10  Pain out: 0/10  Location:  none      Objective     Thu participated in dynamic functional therapeutic activities to improve functional performance for 25 minutes, including: Educated on purchasing a pelvic wand to assist with her pelvic floor tension. May need vaginal suppositories to assist with bowel evacuation in the  future.     Thu received the following manual therapy techniques:  for 15 minutes including: PF manual therapy with trigger point release to the bilateral levator ani     Intervention 11/11/2024 11/19/2024 11/25/2024    Neuro Re-Ed  lower extremity ROM, home exercise program provided and encouraged mobility at home. Physical therapist assisted pt with bilateral lower extremity ROM to her tolerance.  rehabilitative ultrasound to the perineum with PF down training and awareness. Encouraged down training with diaphragmatic breathing.   PF manual therapy with trigger point release to the bilateral levator ani    Manual Ther Vaginal exam  Hypertonus - minimal PF contraction, limited mobility with attempts at bowel evacuation.     TherAct Education Educated on using Baclofen to assist with muscle spasms as it is prescribed, she has not used it in about 2 weeks when her bladder symptoms started. Patient feels tension in the hips and has trouble walking when first getting up due to tightness.  Educated pelvic floor stretching at home and treating the pelvic floor like another skeletal muscle.  Educated on purchasing a pelvic wand to assist with her pelvic floor tension. May need vaginal suppositories to assist with bowel evacuation in the future.        Home Exercises Provided and Patient Education Provided     Education provided:   - anatomy/physiology of pelvic floor and posture/body mechanices  Discussed progression of plan of care with patient; educated pt in activity modification; reviewed HEP with pt. Pt demonstrated and verbalized understanding of all instruction and was provided with a handout of HEP (see Patient Instructions).    Written Home Exercises Provided: yes.  Exercises were reviewed and Thu was able to demonstrate them prior to the end of the session.  Thu demonstrated good  understanding of the education provided.     See EMR under Patient Instructions for exercises provided 11/25/2024  .    Assessment     Still with urgency and frequency. Limited pelvic floor mobility with vaginal exam and rehabilitative ultrasound. Sensation is intact.     Thu Is progressing well towards her goals.   Pt prognosis is Good.     Pt will continue to benefit from skilled outpatient physical therapy to address the deficits listed in the problem list box on initial evaluation, provide pt/family education and to maximize pt's level of independence in the home and community environment.     Pt's spiritual, cultural and educational needs considered and pt agreeable to plan of care and goals.     Anticipated barriers to physical therapy: none     Short Term Goals: 6 weeks   Patient will deny straining for bowel movements. Goal not met - progressing   Patient will deny fecal incontinence. Goal met 11/19/2024   Patient will be independent with pelvic floor relaxation to improve bowel evacuation. Goal not met - progressing   Patient will be able to demonstrate improved pelvic floor relaxation and contraction on rehabilitative ultrasound. Goal not met - progressing      Long Term Goals: 12 weeks   Patient will report bristol stool type 4 for bowel movements. Goal not met - progressing   Patient will deny abdominal pain. Goal met 11/19/2024   Patient will be independent with core and pelvic floor awareness and relaxation. Goal not met - progressing   Patient will demonstrate adequate pelvic floor coordination with contraction and relaxation on sEMG vs rehabilitative ultrasound to allow for improved bowel habits. Goal not met - progressing     Plan     Continue with plan of care 1x per week 12 weeks    Purchasing a pelvic wand, assess if this assists with bowel evacuation and PF mobility. May also benefit from vaginal estrogen use. Sees gyn in January 2025.     Shmuel Saul, PT

## 2024-11-27 PROBLEM — M85.38 OSTEITIS CONDENSANS ILII: Status: ACTIVE | Noted: 2024-11-27

## 2024-12-02 ENCOUNTER — CLINICAL SUPPORT (OUTPATIENT)
Dept: REHABILITATION | Facility: HOSPITAL | Age: 49
End: 2024-12-02
Payer: COMMERCIAL

## 2024-12-02 DIAGNOSIS — N81.89 PELVIC FLOOR WEAKNESS: ICD-10-CM

## 2024-12-02 DIAGNOSIS — R32 BOWEL AND BLADDER INCONTINENCE: ICD-10-CM

## 2024-12-02 DIAGNOSIS — N39.41 URGE INCONTINENCE OF URINE: Primary | ICD-10-CM

## 2024-12-02 DIAGNOSIS — N39.46 MIXED INCONTINENCE URGE AND STRESS: ICD-10-CM

## 2024-12-02 DIAGNOSIS — R15.9 BOWEL AND BLADDER INCONTINENCE: ICD-10-CM

## 2024-12-02 PROCEDURE — 97530 THERAPEUTIC ACTIVITIES: CPT | Mod: PN | Performed by: PHYSICAL THERAPIST

## 2024-12-02 PROCEDURE — 97140 MANUAL THERAPY 1/> REGIONS: CPT | Mod: PN | Performed by: PHYSICAL THERAPIST

## 2024-12-02 NOTE — PROGRESS NOTES
Pelvic Health Physical Therapy   Treatment Note      Name: Thu Juares  Clinic Number: [id number]    Therapy Diagnosis:   Encounter Diagnoses   Name Primary?    Urge incontinence of urine Yes    Bowel and bladder incontinence     Pelvic floor weakness     Mixed incontinence urge and stress      Physician: Tami Pichardo NP    Visit Date: 12/2/2024    Physician Orders: PT Eval and Treat PF PT  Medical Diagnosis from Referral: G35 (ICD-10-CM) - Multiple sclerosis N39.41 (ICD-10-CM) - Urge incontinence of urine R32,R15.9 (ICD-10-CM) - Bowel and bladder incontinence  Evaluation Date: 8/21/2024  Authorization Period Expiration: 12/31/20024  Plan of Care Expiration: 2/12/2025  Progress Note Due: 12/19/2024  Visit # 8/12 Visits authorized: 8/20   FOTO: 3/3    Cancelled Visits: 0  No Show Visits: 0    Time In: 11:30 AM   Time Out: 11:53 AM   Total Billable Time: 23 minutes    Precautions: Standard    Subjective     Pt reports: Still with urgency. Finished her antibiotics. Using vaginal estrogen.     She was compliant with home exercise program.  Response to previous treatment: none  Functional change: none    Pain in: 0/10  Pain out: 0/10  Location:  none      Objective     Thu participated in dynamic functional therapeutic activities to improve functional performance for 8 minutes, including: Educated on purchasing a pelvic wand to assist with her pelvic floor tension. May need vaginal suppositories to assist with bowel evacuation in the future.     Thu received the following manual therapy techniques:  for 15 minutes including: PF manual therapy with trigger point release to the bilateral levator ani         Intervention 11/11/2024 11/19/2024 11/25/2024 12/02/2024    Neuro Re-Ed  lower extremity ROM, home exercise program provided and encouraged mobility at home. Physical therapist assisted pt with bilateral lower extremity ROM to her tolerance.  rehabilitative ultrasound to the perineum with PF down  training and awareness. Encouraged down training with diaphragmatic breathing.   PF manual therapy with trigger point release to the bilateral levator ani     Manual Ther Vaginal exam  Hypertonus - minimal PF contraction, limited mobility with attempts at bowel evacuation.   PF manual therapy with trigger point release to the bilateral levator ani      TherAct Education Educated on using Baclofen to assist with muscle spasms as it is prescribed, she has not used it in about 2 weeks when her bladder symptoms started. Patient feels tension in the hips and has trouble walking when first getting up due to tightness.  Educated pelvic floor stretching at home and treating the pelvic floor like another skeletal muscle.  Educated on purchasing a pelvic wand to assist with her pelvic floor tension. May need vaginal suppositories to assist with bowel evacuation in the future.  Educated on purchasing a pelvic wand to assist with her pelvic floor tension. May need vaginal suppositories to assist with bowel evacuation in the future.        Home Exercises Provided and Patient Education Provided     Education provided:   - anatomy/physiology of pelvic floor and posture/body mechanices  Discussed progression of plan of care with patient; educated pt in activity modification; reviewed HEP with pt. Pt demonstrated and verbalized understanding of all instruction and was provided with a handout of HEP (see Patient Instructions).    Written Home Exercises Provided: yes.  Exercises were reviewed and Thu was able to demonstrate them prior to the end of the session.  Thu demonstrated good  understanding of the education provided.     See EMR under Patient Instructions for exercises provided 12/02/2024 .    Assessment     Still with urgency and frequency. Limited pelvic floor mobility with vaginal exam and rehabilitative ultrasound. Sensation is intact.     Thu Is progressing well towards her goals.   Pt prognosis is Good.     Pt will  continue to benefit from skilled outpatient physical therapy to address the deficits listed in the problem list box on initial evaluation, provide pt/family education and to maximize pt's level of independence in the home and community environment.     Pt's spiritual, cultural and educational needs considered and pt agreeable to plan of care and goals.     Anticipated barriers to physical therapy: none     Short Term Goals: 6 weeks   Patient will deny straining for bowel movements. Goal not met - progressing   Patient will deny fecal incontinence. Goal met 11/19/2024   Patient will be independent with pelvic floor relaxation to improve bowel evacuation. Goal not met - progressing   Patient will be able to demonstrate improved pelvic floor relaxation and contraction on rehabilitative ultrasound. Goal not met - progressing      Long Term Goals: 12 weeks   Patient will report bristol stool type 4 for bowel movements. Goal not met - progressing   Patient will deny abdominal pain. Goal met 11/19/2024   Patient will be independent with core and pelvic floor awareness and relaxation. Goal not met - progressing   Patient will demonstrate adequate pelvic floor coordination with contraction and relaxation on sEMG vs rehabilitative ultrasound to allow for improved bowel habits. Goal not met - progressing     Plan     Continue with plan of care 1x per week 12 weeks    Purchasing a pelvic wand, assess if this assists with bowel evacuation and PF mobility. Using vaginal estrogen- needs to be more consisten. May also benefit from Baclophen suppositories. Sees Cinthya at the end of December.     Shmuel Saul, PT

## 2024-12-16 ENCOUNTER — PATIENT MESSAGE (OUTPATIENT)
Dept: PSYCHIATRY | Facility: CLINIC | Age: 49
End: 2024-12-16
Payer: COMMERCIAL

## 2024-12-27 ENCOUNTER — OFFICE VISIT (OUTPATIENT)
Dept: UROGYNECOLOGY | Facility: CLINIC | Age: 49
End: 2024-12-27
Payer: COMMERCIAL

## 2024-12-27 DIAGNOSIS — N89.8 VAGINAL DRYNESS: ICD-10-CM

## 2024-12-27 DIAGNOSIS — M62.89 PELVIC FLOOR TENSION: Primary | ICD-10-CM

## 2024-12-27 DIAGNOSIS — N39.46 URINARY INCONTINENCE, MIXED: ICD-10-CM

## 2024-12-27 DIAGNOSIS — K59.00 CONSTIPATION, UNSPECIFIED CONSTIPATION TYPE: ICD-10-CM

## 2024-12-27 PROCEDURE — 99213 OFFICE O/P EST LOW 20 MIN: CPT | Mod: 95,,, | Performed by: NURSE PRACTITIONER

## 2024-12-27 PROCEDURE — 1160F RVW MEDS BY RX/DR IN RCRD: CPT | Mod: CPTII,95,, | Performed by: NURSE PRACTITIONER

## 2024-12-27 PROCEDURE — 1159F MED LIST DOCD IN RCRD: CPT | Mod: CPTII,95,, | Performed by: NURSE PRACTITIONER

## 2024-12-27 NOTE — PATIENT INSTRUCTIONS
1)  Mixed urinary incontinence, urge > stress:    --Empty bladder every 3 hours.  Empty well: wait a minute, lean forward on toilet.    --Avoid dietary irritants (see sheet).  Keep diary x 3-5 days to determine your irritants.  --do pelvic floor home exercise program  --URGE: consider gemtesa if estrogen cream doesn't healp        2)Constipation:  --Take 1 fiber pill/day x 3 days.  Then 2 pills/day x 3 days.  Then 3 pills/day x 3 days...increasing in this fashion to max 6 pills a day.  STOP when you find dose that makes stool easy to pass (this may be 1 pill a day or may be 4 pills/day).  Continue at this dose FOREVER.  Additionally, take 1-2 stool softener pills (EX: colace) OTC daily.  AVOID laxatives.    OR  You can use the wafers- make sure to drink water with it.       3)start vaginal estrogen cream  --use dime size amount in vagina-- insert to your second knuckle and rub around just inside vaginal opening twice weekly    4)pelvic floor tension  --continue pelvic floor PT  --will try vaginal valium / lidocaine/ baclofen-- can use one suppository twice daily  --I have sent in the prescription to [facility] in [place], [place].  Their phone number is [phone].  Ask for the compounding department.      6)RTC 2 months

## 2024-12-27 NOTE — PROGRESS NOTES
Urogyn follow up  12/31/2024  .  [facility] - UROGYNECOLOGY  [address] [address]    Thu Juares  [id number]  1975      Thu Juares is a 49 y.o. here for a urogyn follow up for urinary incontinence    The patient location is: [place]  The chief complaint leading to consultation is: urge incontinence    Visit type: audiovisual      Each patient to whom he or she provides medical services by telemedicine is:  (1) informed of the relationship between the physician and patient and the respective role of any other health care provider with respect to management of the patient; and (2) notified that he or she may decline to receive medical services by telemedicine and may withdraw from such care at any time.    Notes:    Last HPI from 03/22/2023     1)  UI:  (--) TERRI < (+) UUI  X 3years. Occasionally does not make it to the restroom if holding longer than 3-4 hours-- can not stop the flow of urine (+) pads:mostly for protection.  Daytime frequency: Q 3 hours.  Nocturia: Yes: 1/night.   (--) dysuria,  (--) hematuria,  (--) frequent UTIs.  (+) complete bladder emptying.      2)  POP:  Absent. Symptoms:(--)  .  (--) vaginal bleeding. (--) vaginal discharge. (+) sexually active.  (--) dyspareunia.   (--)  Vaginal dryness.  (--) vaginal estrogen use.  Anorgasma--present for the past year     3)  BM:  (+) constipation/straining.  (--) chronic diarrhea. (--) hematochezia.  (--) fecal incontinence.  (+) fecal smearing/urgency.  (--) complete evacuation.       4)MS  --diagnosed in 2004  --typical flare starts with vertigo. No sleeping can cause exacerbaton as well as word finding  --taking ocrevis--last dose in 02/2023 07/19/2023     1)  Mixed urinary incontinence, urge > stress:    --has been going to pelvic floor PT  --still has urinary urgency  --voiding every 2-3 hours while awake     2)Constipation:  --improved with  fiber supplement    3)complains of RL back pain    11/06/2023  1)  Mixed  urinary incontinence, urge > stress:    --completed pelvic floor PT  --symptoms initially improved--has not been compliant with HEP  --voiding every 3 hours while awake  --does have urinary urgency if she ignores urge  --nocturia 1-2/ night  --did not try gemtesa     2)Constipation:  --taking probiotics  --stopped fiber supplement  --intermittent constipation    2023--had kidney stone-- treated at [facility]     E visit for dysruia --UC multiple organisms none in predominance    2024  Began having dysuria 2 days ago-- thinks she passed a stone Monday. Still has flank pain  Urinary frequency improved after she passed the stone.  Still has urinary urgency  + R flank pain  Denies fever  +UUI if ignores urge--harder to stop  Voiding every 1-2 hours during the day  Nocturia 1-2/night  Wearing a pad for protection    Changes since last visit:  Using estrogen cream intermittently  Last uti treated last month by urgent care--not sure uc results  Denies flank pain  Having pelvic floor tension-- going to [facility]--neurologist has her on baclofen 4 times daily  + constipation--not taking stool softener         Past Medical History:   Diagnosis Date    Hypothyroidism surgical for benign nodules    Multiple sclerosis        Past Surgical History:   Procedure Laterality Date     SECTION  10/2013    COLONOSCOPY N/A 10/12/2024    Procedure: COLONOSCOPY;  Surgeon: America Randolph MD;  [facility];  Service: Endoscopy;  Laterality: N/A;    ESOPHAGOGASTRODUODENOSCOPY N/A 10/12/2024    Procedure: EGD (ESOPHAGOGASTRODUODENOSCOPY);  Surgeon: America Randolph MD;  [facility];  Service: Endoscopy;  Laterality: N/A;  JOANA Vences/Fabrice/portal SW  10.10 case time adjusted to 60 min.; AP  10.11 precall complete; all ques answered; AP       Family History   Problem Relation Name Age of Onset    Breast cancer Sister      Thyroid disease Neg Hx      Diabetes Neg Hx          Social History     Socioeconomic History    Marital status:    Tobacco Use    Smoking status: Never    Smokeless tobacco: Former   Substance and Sexual Activity    Alcohol use: Not Currently    Drug use: Never    Sexual activity: Yes     Partners: Male     Social Drivers of Health     Financial Resource Strain: Low Risk  (7/21/2024)    Overall Financial Resource Strain (CARDIA)     Difficulty of Paying Living Expenses: Not hard at all   Food Insecurity: No Food Insecurity (7/21/2024)    Hunger Vital Sign     Worried About Running Out of Food in the Last Year: Never true     Ran Out of Food in the Last Year: Never true   Physical Activity: Inactive (7/21/2024)    Exercise Vital Sign     Days of Exercise per Week: 0 days     Minutes of Exercise per Session: 0 min   Stress: No Stress Concern Present (7/21/2024)    [employer] [place] [employer] - Occupational Stress Questionnaire     Feeling of Stress : Only a little   Housing Stability: Unknown (7/21/2024)    Housing Stability Vital Sign     Unable to Pay for Housing in the Last Year: No       Current Outpatient Medications   Medication Sig Dispense Refill    atorvastatin (LIPITOR) 40 MG tablet Take 40 mg by mouth once daily.      baclofen (LIORESAL) 5 mg Tab tablet Take 5mg (1 tablet) by mouth in the morning and afternoon and 10mg (2 tablets) by mouth in the evening. 360 tablet 1    colesevelam (WELCHOL) 625 mg tablet Take 3 tablets (1,875 mg total) by mouth 2 (two) times daily with meals. (Patient not taking: Reported on 11/22/2024) 540 tablet 3    ergocalciferol (VITAMIN D2) 50,000 unit Cap Take 50,000 Units by mouth every 7 days.      estradioL (ESTRACE) 0.01 % (0.1 mg/gram) vaginal cream Place 1 g vaginally once daily. 42.5 g 3    meclizine (ANTIVERT) 12.5 mg tablet Take 1 tablet (12.5 mg total) by mouth 3 (three) times daily as needed for Dizziness. 90 tablet 5    ocrelizumab (OCREVUS) 30 mg/mL Soln Every six months.      ondansetron  (ZOFRAN-ODT) 4 MG TbDL Take 1 tablet (4 mg total) by mouth every 8 (eight) hours as needed (nausea). 30 tablet 0    SYNTHROID 88 mcg tablet Take 1 tablet (88 mcg total) by mouth before breakfast. 90 tablet 1     No current facility-administered medications for this visit.       Review of patient's allergies indicates:   Allergen Reactions    Natalizumab        Well woman:  Pap test: 10/2022 normal per report.  History of abnormal paps: Yes - in 1990's-- had cryo.  History of STIs:  No  Mammogram: Date of last: 1/2024.  Result: Normal   Colonoscopy: Date of last: 02/2023.  Result:  polyp per patient report-- repeat in 7 years.    DEXA:  n/a     ROS:  As per HPI.      Exam  There were no vitals taken for this visit.  General: alert and oriented, no acute distress  Respiratory: normal respiratory effort  Abd: soft, non-tender, non-distended        PELVIC EXAM: deferred      Impression  1. Pelvic floor tension        2. Urinary incontinence, mixed        3. Vaginal dryness        4. Constipation, unspecified constipation type              We reviewed the above issues and discussed options for short-term versus long-term management of her problems.   Plan:      1)  Mixed urinary incontinence, urge > stress:    --Empty bladder every 3 hours.  Empty well: wait a minute, lean forward on toilet.    --Avoid dietary irritants (see sheet).  Keep diary x 3-5 days to determine your irritants.  --do pelvic floor home exercise program  --URGE: consider gemtesa if estrogen cream doesn't healp        2)Constipation:  --Take 1 fiber pill/day x 3 days.  Then 2 pills/day x 3 days.  Then 3 pills/day x 3 days...increasing in this fashion to max 6 pills a day.  STOP when you find dose that makes stool easy to pass (this may be 1 pill a day or may be 4 pills/day).  Continue at this dose FOREVER.  Additionally, take 1-2 stool softener pills (EX: colace) OTC daily.  AVOID laxatives.    OR  You can use the wafers- make sure to drink water with  it.       3)start vaginal estrogen cream  --use dime size amount in vagina-- insert to your second knuckle and rub around just inside vaginal opening twice weekly    4)pelvic floor tension  --continue pelvic floor PT  --will try vaginal valium / lidocaine/ baclofen-- can use one suppository twice daily  --I have sent in the prescription to [facility] in [place], [place].  Their phone number is [phone].  Ask for the compounding department.      6)RTC 2 months       Face to Face time with patient: 10  20 minutes of total time spent on the encounter, which includes face to face time and non-face to face time preparing to see the patient (eg, review of tests), Obtaining and/or reviewing separately obtained history, Documenting clinical information in the electronic or other health record, Independently interpreting results (not separately reported) and communicating results to the patient/family/caregiver, or Care coordination (not separately reported).     Cinthya Kumari, BRYANTP-BC  [facility]  Division of Female Pelvic Medicine and Reconstructive Surgery  Department of Obstetrics & Gynecology

## 2025-01-07 ENCOUNTER — CLINICAL SUPPORT (OUTPATIENT)
Dept: REHABILITATION | Facility: HOSPITAL | Age: 50
End: 2025-01-07
Payer: COMMERCIAL

## 2025-01-07 DIAGNOSIS — N39.46 MIXED INCONTINENCE URGE AND STRESS: ICD-10-CM

## 2025-01-07 DIAGNOSIS — R32 BOWEL AND BLADDER INCONTINENCE: ICD-10-CM

## 2025-01-07 DIAGNOSIS — R15.9 BOWEL AND BLADDER INCONTINENCE: ICD-10-CM

## 2025-01-07 DIAGNOSIS — N39.41 URGE INCONTINENCE OF URINE: Primary | ICD-10-CM

## 2025-01-07 DIAGNOSIS — N81.89 PELVIC FLOOR WEAKNESS: ICD-10-CM

## 2025-01-07 PROCEDURE — 97530 THERAPEUTIC ACTIVITIES: CPT | Performed by: PHYSICAL THERAPIST

## 2025-01-07 PROCEDURE — 97140 MANUAL THERAPY 1/> REGIONS: CPT | Performed by: PHYSICAL THERAPIST

## 2025-01-07 NOTE — PROGRESS NOTES
"  Pelvic Health Physical Therapy   Treatment Note and Progress Note     Name: Thu Juares  Clinic Number: [id number]    Therapy Diagnosis:   Encounter Diagnoses   Name Primary?    Urge incontinence of urine Yes    Bowel and bladder incontinence     Pelvic floor weakness     Mixed incontinence urge and stress      Physician: Tami Pichardo NP    Visit Date: 1/7/2025    Physician Orders: PT Eval and Treat PF PT  Medical Diagnosis from Referral: G35 (ICD-10-CM) - Multiple sclerosis N39.41 (ICD-10-CM) - Urge incontinence of urine R32,R15.9 (ICD-10-CM) - Bowel and bladder incontinence  Evaluation Date: 8/21/2024  Authorization Period Expiration: 12/31/20024  Plan of Care Expiration: 2/12/2025  Progress Note Due: 12/19/2024  Visit # 9/12 Visits authorized: 1/10   FOTO: 4/3    Cancelled Visits: 0  No Show Visits: 0    Time In: 8:15 AM   Time Out: 9:03 AM   Total Billable Time: 48 minutes        Precautions: Standard    Subjective     Pt reports: Bowels are better, but she slacked off on her vitamins. She was very sick recently - could not even dry her hair. Swabbed negative for flu and Covid. Still doing her vaginal estrogen. Did get a script for vaginal suppositories - has not yet received it. She did get a wand at home, she tried it, but does not know if she is doing it correctly. Did not notice improved bowel evacuation after use of pelvic wand. Does not know if she will leak stool. Stomach discomfort - various locations. Constant pain for 2-3 weeks. Gallbladder is "working" so they will not take it out.       She was compliant with home exercise program.  Response to previous treatment: none  Functional change: none    Pain in: 0/10  Pain out: 0/10  Location:  none      Objective     Thu participated in dynamic functional therapeutic activities to improve functional performance for 33 minutes, including: educated on use of pelvic wand. Educated on monitor bowel habits with use of pelvic wand and suppositories. "     Thu received the following manual therapy techniques:  for 15 minutes including: PF manual therapy with trigger point release to the bilateral levator ani. Limited pelvic floor contraction and mobility.         Intervention 11/11/2024 11/19/2024 11/25/2024 12/02/2024 01/07/2025    Neuro Re-Ed  lower extremity ROM, home exercise program provided and encouraged mobility at home. Physical therapist assisted pt with bilateral lower extremity ROM to her tolerance.  rehabilitative ultrasound to the perineum with PF down training and awareness. Encouraged down training with diaphragmatic breathing.   PF manual therapy with trigger point release to the bilateral levator ani      Manual Ther Vaginal exam  Hypertonus - minimal PF contraction, limited mobility with attempts at bowel evacuation.   PF manual therapy with trigger point release to the bilateral levator ani    PF manual therapy with trigger point release to the bilateral levator ani. Limited pelvic floor contraction and mobility.    TherAct Education Educated on using Baclofen to assist with muscle spasms as it is prescribed, she has not used it in about 2 weeks when her bladder symptoms started. Patient feels tension in the hips and has trouble walking when first getting up due to tightness.  Educated pelvic floor stretching at home and treating the pelvic floor like another skeletal muscle.  Educated on purchasing a pelvic wand to assist with her pelvic floor tension. May need vaginal suppositories to assist with bowel evacuation in the future.  Educated on purchasing a pelvic wand to assist with her pelvic floor tension. May need vaginal suppositories to assist with bowel evacuation in the future.  educated on use of pelvic wand. Educated on monitor bowel habits with use of pelvic wand and suppositories.        Home Exercises Provided and Patient Education Provided     Education provided:   - anatomy/physiology of pelvic floor and posture/body  "mechanices  Discussed progression of plan of care with patient; educated pt in activity modification; reviewed HEP with pt. Pt demonstrated and verbalized understanding of all instruction and was provided with a handout of HEP (see Patient Instructions).    Written Home Exercises Provided: yes.  Exercises were reviewed and Thu was able to demonstrate them prior to the end of the session.  Thu demonstrated good  understanding of the education provided.     See EMR under Patient Instructions for exercises provided 01/07/2025 .    Assessment     Still with urgency and frequency. Limited pelvic floor mobility with vaginal exam and rehabilitative ultrasound. Sensation is intact.     Thu Is progressing well towards her goals.   Pt prognosis is Good.     Pt will continue to benefit from skilled outpatient physical therapy to address the deficits listed in the problem list box on initial evaluation, provide pt/family education and to maximize pt's level of independence in the home and community environment.     Pt's spiritual, cultural and educational needs considered and pt agreeable to plan of care and goals.     Anticipated barriers to physical therapy: none     Short Term Goals: 6 weeks   Patient will deny straining for bowel movements. Goal not met - progressing   Patient will deny fecal incontinence. Goal met 11/19/2024 - unpredictable  Patient will be independent with pelvic floor relaxation to improve bowel evacuation. Goal not met - progressing   Patient will be able to demonstrate improved pelvic floor relaxation and contraction on rehabilitative ultrasound. Goal not met - progressing      Long Term Goals: 12 weeks   Patient will report bristol stool type 4 for bowel movements. Goal met - 01/07/2025 - "regular"  Patient will deny abdominal pain. Goal met 11/19/2024   Patient will be independent with core and pelvic floor awareness and relaxation. Goal not met - progressing   Patient will demonstrate adequate " pelvic floor coordination with contraction and relaxation on sEMG vs rehabilitative ultrasound to allow for improved bowel habits. Goal not met - progressing     Plan       Monitor bowel evacuation. Purchased a pelvic wand, assess if this assists with bowel evacuation and PF mobility. Will  try suppositories.     Shmuel Saul, PT

## 2025-01-07 NOTE — PATIENT INSTRUCTIONS
"     Insert the wand parallel to the floor - it will naturally go to the side walls of the vagina   - avoid 12 o'clock and 6 o'clock position  - frequency of wand - 2-3 times a week - pay attention to how you are feeling   - once you find the area - may be tender - hold pressure for about 20 seconds, move to gentle massage in "C" shape    "

## 2025-01-13 ENCOUNTER — CLINICAL SUPPORT (OUTPATIENT)
Dept: REHABILITATION | Facility: HOSPITAL | Age: 50
End: 2025-01-13
Payer: COMMERCIAL

## 2025-01-13 DIAGNOSIS — R32 BOWEL AND BLADDER INCONTINENCE: ICD-10-CM

## 2025-01-13 DIAGNOSIS — N39.46 MIXED INCONTINENCE URGE AND STRESS: ICD-10-CM

## 2025-01-13 DIAGNOSIS — N81.89 PELVIC FLOOR WEAKNESS: ICD-10-CM

## 2025-01-13 DIAGNOSIS — N39.41 URGE INCONTINENCE OF URINE: Primary | ICD-10-CM

## 2025-01-13 DIAGNOSIS — R15.9 BOWEL AND BLADDER INCONTINENCE: ICD-10-CM

## 2025-01-13 PROCEDURE — 97530 THERAPEUTIC ACTIVITIES: CPT | Mod: PN | Performed by: PHYSICAL THERAPIST

## 2025-01-13 PROCEDURE — 97140 MANUAL THERAPY 1/> REGIONS: CPT | Mod: PN | Performed by: PHYSICAL THERAPIST

## 2025-01-13 NOTE — PROGRESS NOTES
Pelvic Health Physical Therapy   Treatment Note     Name: Thu Juares  Clinic Number: [id number]    Therapy Diagnosis:   Encounter Diagnoses   Name Primary?    Urge incontinence of urine Yes    Bowel and bladder incontinence     Mixed incontinence urge and stress     Pelvic floor weakness      Physician: Tami Pichardo NP    Visit Date: 1/13/2025    Physician Orders: PT Eval and Treat PF PT  Medical Diagnosis from Referral: G35 (ICD-10-CM) - Multiple sclerosis N39.41 (ICD-10-CM) - Urge incontinence of urine R32,R15.9 (ICD-10-CM) - Bowel and bladder incontinence  Evaluation Date: 8/21/2024  Authorization Period Expiration: 12/31/20024  Plan of Care Expiration: 2/12/2025  Progress Note Due: 2/7/2025  Visit # 10/12 Visits authorized: 2/10   FOTO: 4/3    Cancelled Visits: 0  No Show Visits: 0    Time In: 1:05 PM    Time Out: 1:39 PM   Total Billable Time: 34 minutes        Precautions: Standard    Subjective     Pt reports: Tried suppositories - had a bowel movement today. She has used them 3 times - Wed, Thurs, and Sunday. Did do her pelvic wand 3xs. Had a bowel movement after our last visit. Improved hip stiffness with vaginal suppository.     She was compliant with home exercise program.  Response to previous treatment: none  Functional change: none    Pain in: 0/10  Pain out: 0/10  Location:  none      Objective     Thu participated in dynamic functional therapeutic activities to improve functional performance for 19 minutes, including:  educated on continued use of pelvic wand. Educated on monitor bowel habits with use of pelvic wand and suppositories. Goal is to prevent bloating and straining for bowel evacuation.     Thu received the following manual therapy techniques:  for 15 minutes including: PF manual therapy with trigger point release to the bilateral levator ani. Improved pelvic floor mobility noted, slight lift palpated.         Intervention 11/25/2024 12/02/2024 01/07/2025 01/13/2025     Neuro Re-Ed   PF manual therapy with trigger point release to the bilateral levator ani       Manual Ther Vaginal exam  PF manual therapy with trigger point release to the bilateral levator ani    PF manual therapy with trigger point release to the bilateral levator ani. Limited pelvic floor contraction and mobility.  PF manual therapy with trigger point release to the bilateral levator ani. Improved pelvic floor mobility noted, slight lift palpated.    TherAct Education Educated on purchasing a pelvic wand to assist with her pelvic floor tension. May need vaginal suppositories to assist with bowel evacuation in the future.  Educated on purchasing a pelvic wand to assist with her pelvic floor tension. May need vaginal suppositories to assist with bowel evacuation in the future.  educated on use of pelvic wand. Educated on monitor bowel habits with use of pelvic wand and suppositories.  educated on continued use of pelvic wand. Educated on monitor bowel habits with use of pelvic wand and suppositories. Goal is to prevent bloating and straining for bowel evacuation.        Home Exercises Provided and Patient Education Provided     Education provided:   - anatomy/physiology of pelvic floor and posture/body mechanices  Discussed progression of plan of care with patient; educated pt in activity modification; reviewed HEP with pt. Pt demonstrated and verbalized understanding of all instruction and was provided with a handout of HEP (see Patient Instructions).    Written Home Exercises Provided: yes.  Exercises were reviewed and Thu was able to demonstrate them prior to the end of the session.  Thu demonstrated good  understanding of the education provided.     See EMR under Patient Instructions for exercises provided 01/13/2025 .    Assessment     Still with urgency and frequency. Improved pelvic floor mobility with vaginal exam and rehabilitative ultrasound. Sensation is intact.     Thu Is progressing well  "towards her goals.   Pt prognosis is Good.     Pt will continue to benefit from skilled outpatient physical therapy to address the deficits listed in the problem list box on initial evaluation, provide pt/family education and to maximize pt's level of independence in the home and community environment.     Pt's spiritual, cultural and educational needs considered and pt agreeable to plan of care and goals.     Anticipated barriers to physical therapy: none     Short Term Goals: 6 weeks   Patient will deny straining for bowel movements. Goal not met - progressing   Patient will deny fecal incontinence. Goal met 11/19/2024 - unpredictable  Patient will be independent with pelvic floor relaxation to improve bowel evacuation. Goal not met - progressing   Patient will be able to demonstrate improved pelvic floor relaxation and contraction on rehabilitative ultrasound. Goal not met - progressing      Long Term Goals: 12 weeks   Patient will report bristol stool type 4 for bowel movements. Goal met - 01/07/2025 - "regular"  Patient will deny abdominal pain. Goal met 11/19/2024   Patient will be independent with core and pelvic floor awareness and relaxation. Goal not met - progressing   Patient will demonstrate adequate pelvic floor coordination with contraction and relaxation on sEMG vs rehabilitative ultrasound to allow for improved bowel habits. Goal not met - progressing     Plan       Monitor bowel evacuation. Using a pelvic wand, assess if this assists with bowel evacuation and PF mobility.     Shmuel Saul, PT                   "

## 2025-01-15 ENCOUNTER — LAB VISIT (OUTPATIENT)
Dept: LAB | Facility: HOSPITAL | Age: 50
End: 2025-01-15
Attending: STUDENT IN AN ORGANIZED HEALTH CARE EDUCATION/TRAINING PROGRAM
Payer: COMMERCIAL

## 2025-01-15 DIAGNOSIS — G35 MULTIPLE SCLEROSIS: ICD-10-CM

## 2025-01-15 LAB
25(OH)D3+25(OH)D2 SERPL-MCNC: 29 NG/ML (ref 30–96)
ALBUMIN SERPL BCP-MCNC: 4.1 G/DL (ref 3.5–5.2)
ALP SERPL-CCNC: 91 U/L (ref 40–150)
ALT SERPL W/O P-5'-P-CCNC: 15 U/L (ref 10–44)
ANION GAP SERPL CALC-SCNC: 9 MMOL/L (ref 8–16)
AST SERPL-CCNC: 15 U/L (ref 10–40)
BASOPHILS # BLD AUTO: 0.03 K/UL (ref 0–0.2)
BASOPHILS NFR BLD: 0.5 % (ref 0–1.9)
BILIRUB SERPL-MCNC: 0.5 MG/DL (ref 0.1–1)
BUN SERPL-MCNC: 12 MG/DL (ref 6–20)
CALCIUM SERPL-MCNC: 9.5 MG/DL (ref 8.7–10.5)
CHLORIDE SERPL-SCNC: 107 MMOL/L (ref 95–110)
CO2 SERPL-SCNC: 27 MMOL/L (ref 23–29)
CREAT SERPL-MCNC: 0.8 MG/DL (ref 0.5–1.4)
DIFFERENTIAL METHOD BLD: ABNORMAL
EOSINOPHIL # BLD AUTO: 0.1 K/UL (ref 0–0.5)
EOSINOPHIL NFR BLD: 1.2 % (ref 0–8)
ERYTHROCYTE [DISTWIDTH] IN BLOOD BY AUTOMATED COUNT: 11.7 % (ref 11.5–14.5)
EST. GFR  (NO RACE VARIABLE): >60 ML/MIN/1.73 M^2
GLUCOSE SERPL-MCNC: 98 MG/DL (ref 70–110)
HBV CORE AB SERPL QL IA: NORMAL
HBV SURFACE AG SERPL QL IA: NORMAL
HCT VFR BLD AUTO: 46.5 % (ref 37–48.5)
HGB BLD-MCNC: 15.3 G/DL (ref 12–16)
IGA SERPL-MCNC: 132 MG/DL (ref 40–350)
IGG SERPL-MCNC: 952 MG/DL (ref 650–1600)
IGM SERPL-MCNC: 33 MG/DL (ref 50–300)
IMM GRANULOCYTES # BLD AUTO: 0.08 K/UL (ref 0–0.04)
IMM GRANULOCYTES NFR BLD AUTO: 1.4 % (ref 0–0.5)
LYMPHOCYTES # BLD AUTO: 0.9 K/UL (ref 1–4.8)
LYMPHOCYTES NFR BLD: 15.7 % (ref 18–48)
MCH RBC QN AUTO: 29.6 PG (ref 27–31)
MCHC RBC AUTO-ENTMCNC: 32.9 G/DL (ref 32–36)
MCV RBC AUTO: 90 FL (ref 82–98)
MONOCYTES # BLD AUTO: 0.4 K/UL (ref 0.3–1)
MONOCYTES NFR BLD: 7.5 % (ref 4–15)
NEUTROPHILS # BLD AUTO: 4.2 K/UL (ref 1.8–7.7)
NEUTROPHILS NFR BLD: 73.7 % (ref 38–73)
NRBC BLD-RTO: 0 /100 WBC
PLATELET # BLD AUTO: 274 K/UL (ref 150–450)
PMV BLD AUTO: 9.1 FL (ref 9.2–12.9)
POTASSIUM SERPL-SCNC: 4.2 MMOL/L (ref 3.5–5.1)
PROT SERPL-MCNC: 7.2 G/DL (ref 6–8.4)
RBC # BLD AUTO: 5.17 M/UL (ref 4–5.4)
SODIUM SERPL-SCNC: 143 MMOL/L (ref 136–145)
WBC # BLD AUTO: 5.74 K/UL (ref 3.9–12.7)

## 2025-01-15 PROCEDURE — 82306 VITAMIN D 25 HYDROXY: CPT | Performed by: STUDENT IN AN ORGANIZED HEALTH CARE EDUCATION/TRAINING PROGRAM

## 2025-01-15 PROCEDURE — 36415 COLL VENOUS BLD VENIPUNCTURE: CPT | Performed by: STUDENT IN AN ORGANIZED HEALTH CARE EDUCATION/TRAINING PROGRAM

## 2025-01-15 PROCEDURE — 0361U NEURFLMNT LT CHN DIG IA QUAN: CPT | Performed by: STUDENT IN AN ORGANIZED HEALTH CARE EDUCATION/TRAINING PROGRAM

## 2025-01-15 PROCEDURE — 85025 COMPLETE CBC W/AUTO DIFF WBC: CPT | Performed by: STUDENT IN AN ORGANIZED HEALTH CARE EDUCATION/TRAINING PROGRAM

## 2025-01-15 PROCEDURE — 80053 COMPREHEN METABOLIC PANEL: CPT | Performed by: STUDENT IN AN ORGANIZED HEALTH CARE EDUCATION/TRAINING PROGRAM

## 2025-01-15 PROCEDURE — 87340 HEPATITIS B SURFACE AG IA: CPT | Performed by: STUDENT IN AN ORGANIZED HEALTH CARE EDUCATION/TRAINING PROGRAM

## 2025-01-15 PROCEDURE — 86704 HEP B CORE ANTIBODY TOTAL: CPT | Performed by: STUDENT IN AN ORGANIZED HEALTH CARE EDUCATION/TRAINING PROGRAM

## 2025-01-15 PROCEDURE — 82784 ASSAY IGA/IGD/IGG/IGM EACH: CPT | Mod: 59 | Performed by: STUDENT IN AN ORGANIZED HEALTH CARE EDUCATION/TRAINING PROGRAM

## 2025-01-17 ENCOUNTER — HOSPITAL ENCOUNTER (OUTPATIENT)
Dept: RADIOLOGY | Facility: HOSPITAL | Age: 50
Discharge: HOME | End: 2025-01-17
Attending: STUDENT IN AN ORGANIZED HEALTH CARE EDUCATION/TRAINING PROGRAM
Payer: COMMERCIAL

## 2025-01-17 ENCOUNTER — PATIENT MESSAGE (OUTPATIENT)
Dept: REHABILITATION | Facility: HOSPITAL | Age: 50
End: 2025-01-17
Payer: COMMERCIAL

## 2025-01-17 DIAGNOSIS — G35 MULTIPLE SCLEROSIS: ICD-10-CM

## 2025-01-17 PROCEDURE — 72146 MRI CHEST SPINE W/O DYE: CPT | Mod: TC

## 2025-01-17 PROCEDURE — 72141 MRI NECK SPINE W/O DYE: CPT | Mod: TC

## 2025-01-17 PROCEDURE — 70551 MRI BRAIN STEM W/O DYE: CPT | Mod: TC

## 2025-01-17 PROCEDURE — 72141 MRI NECK SPINE W/O DYE: CPT | Mod: 26,,, | Performed by: RADIOLOGY

## 2025-01-17 PROCEDURE — 72146 MRI CHEST SPINE W/O DYE: CPT | Mod: 26,,, | Performed by: RADIOLOGY

## 2025-01-17 PROCEDURE — 70551 MRI BRAIN STEM W/O DYE: CPT | Mod: 26,,, | Performed by: RADIOLOGY

## 2025-01-20 LAB — NEUROFILAMENT LIGHT CHAIN, PLASMA: 7 PG/ML

## 2025-01-24 ENCOUNTER — PATIENT MESSAGE (OUTPATIENT)
Dept: REHABILITATION | Facility: HOSPITAL | Age: 50
End: 2025-01-24
Payer: COMMERCIAL

## 2025-01-27 ENCOUNTER — CLINICAL SUPPORT (OUTPATIENT)
Dept: REHABILITATION | Facility: HOSPITAL | Age: 50
End: 2025-01-27
Payer: COMMERCIAL

## 2025-01-27 DIAGNOSIS — R15.9 BOWEL AND BLADDER INCONTINENCE: ICD-10-CM

## 2025-01-27 DIAGNOSIS — N81.89 PELVIC FLOOR WEAKNESS: ICD-10-CM

## 2025-01-27 DIAGNOSIS — N39.46 MIXED INCONTINENCE URGE AND STRESS: ICD-10-CM

## 2025-01-27 DIAGNOSIS — N39.41 URGE INCONTINENCE OF URINE: Primary | ICD-10-CM

## 2025-01-27 DIAGNOSIS — R32 BOWEL AND BLADDER INCONTINENCE: ICD-10-CM

## 2025-01-27 PROCEDURE — 97530 THERAPEUTIC ACTIVITIES: CPT | Mod: PN | Performed by: PHYSICAL THERAPIST

## 2025-01-27 PROCEDURE — 97140 MANUAL THERAPY 1/> REGIONS: CPT | Mod: PN | Performed by: PHYSICAL THERAPIST

## 2025-01-27 NOTE — PATIENT INSTRUCTIONS
"Urge incontinence:   - when you are approaching your house   - belly breath and relax - 2 min before your house - pause and gain control   - then 1 min before your house - do a few Kegels - can do slow Kegels (hold for 10 seconds) or quick Kegels - when you do this - DO NOT squeeze for dear life! You will generate pressure in your tummy and push the urine out. This will send a message to your bladder saying "relax and store urine"   - FIRM distraction - think of anything other than the fact that you have to go urinate   - use this to increase your time between urination - up to 3-4 hours         CONTROLLING URINARY / FECAL URGENCY      WHEN YOU EXPERIENCE A STRONG URGE TO URINATE OR DEFECATE:    FIRST Stop activity, stand quietly or sit down. Try to stay very still to maintain control. Avoid rushing to the toilet.    SECOND Begin Quick Flicks (1 second LIFT of pelvic floor muscles, 4 second DROP). Pelvic floor contractions send a message to the bladder to relax and hold urine.     THIRD Relax. Do not rush to the toilet. Take a deep belly or diaphragmatic breath and let it out slowly. Let the urge to urinate pass by using distraction techniques and positive thoughts. Try not to think about going to the bathroom.    FINALLY If the urge returns, repeat the above steps to regain control. When you feel the urge subside, walk normally to the bathroom. You can urinate once the urge has subsided.         For stool: good bowel evacuation is important to prevent urgency   "

## 2025-01-27 NOTE — PROGRESS NOTES
Pelvic Health Physical Therapy   Treatment Note     Name: Thu Juares  Clinic Number: [id number]    Therapy Diagnosis:   Encounter Diagnoses   Name Primary?    Urge incontinence of urine Yes    Bowel and bladder incontinence     Mixed incontinence urge and stress     Pelvic floor weakness      Physician: Tami Pichardo NP    Visit Date: 1/27/2025    Physician Orders: PT Eval and Treat PF PT  Medical Diagnosis from Referral: G35 (ICD-10-CM) - Multiple sclerosis N39.41 (ICD-10-CM) - Urge incontinence of urine R32,R15.9 (ICD-10-CM) - Bowel and bladder incontinence  Evaluation Date: 8/21/2024  Authorization Period Expiration: 12/31/20024  Plan of Care Expiration: 2/12/2025  Progress Note Due: 2/7/2025  Visit # 11/12 Visits authorized: 3/10   FOTO: 4/3    Cancelled Visits: 0  No Show Visits: 0    Time In: 1:09 PM    Time Out: 1:47 PM   Total Billable Time: 38 minutes        Precautions: Standard    Subjective     Pt reports: Has not had water the last week. Did a suppository last night. Still helped with the hip stiffness. Doing 4 Baclofen's per day orally. Seeing neurology next week. Bowels have been doing better. Urgency of urine has been good.     She was compliant with home exercise program.  Response to previous treatment: none  Functional change: none    Pain in: 0/10  Pain out: 0/10  Location:  none      Objective     Thu participated in dynamic functional therapeutic activities to improve functional performance for 8 minutes, including:  educated on continued use of pelvic wand. Will resume use of vaginal suppositories.     Thu received the following manual therapy techniques:  for 30 minutes including: PF manual therapy with trigger point release to the bilateral levator ani. With mild tenderness to palpation. External bulbocavernosus approximation.         Intervention 11/25/2024 12/02/2024 01/07/2025 01/13/2025 01/27/2025    Neuro Re-Ed   PF manual therapy with trigger point release to the  bilateral levator ani        Manual Ther Vaginal exam  PF manual therapy with trigger point release to the bilateral levator ani    PF manual therapy with trigger point release to the bilateral levator ani. Limited pelvic floor contraction and mobility.  PF manual therapy with trigger point release to the bilateral levator ani. Improved pelvic floor mobility noted, slight lift palpated.   PF manual therapy with trigger point release to the bilateral levator ani. With mild tenderness to palpation. External bulbocavernosus approximation.    [employer] Education Educated on purchasing a pelvic wand to assist with her pelvic floor tension. May need vaginal suppositories to assist with bowel evacuation in the future.  Educated on purchasing a pelvic wand to assist with her pelvic floor tension. May need vaginal suppositories to assist with bowel evacuation in the future.  educated on use of pelvic wand. Educated on monitor bowel habits with use of pelvic wand and suppositories.  educated on continued use of pelvic wand. Educated on monitor bowel habits with use of pelvic wand and suppositories. Goal is to prevent bloating and straining for bowel evacuation.  educated on continued use of pelvic wand. Will resume use of vaginal suppositories.        Home Exercises Provided and Patient Education Provided     Education provided:   - anatomy/physiology of pelvic floor and posture/body mechanices  Discussed progression of plan of care with patient; educated pt in activity modification; reviewed HEP with pt. Pt demonstrated and verbalized understanding of all instruction and was provided with a handout of HEP (see Patient Instructions).    Written Home Exercises Provided: yes.  Exercises were reviewed and Thu was able to demonstrate them prior to the end of the session.  Thu demonstrated good  understanding of the education provided.     See EMR under Patient Instructions for exercises provided 01/27/2025 .    Assessment  "    Still with urgency and frequency. Improved pelvic floor mobility with vaginal exam and rehabilitative ultrasound. Sensation is intact.     Thu Is progressing well towards her goals.   Pt prognosis is Good.     Pt will continue to benefit from skilled outpatient physical therapy to address the deficits listed in the problem list box on initial evaluation, provide pt/family education and to maximize pt's level of independence in the home and community environment.     Pt's spiritual, cultural and educational needs considered and pt agreeable to plan of care and goals.     Anticipated barriers to physical therapy: none     Short Term Goals: 6 weeks   Patient will deny straining for bowel movements. Goal not met - progressing   Patient will deny fecal incontinence. Goal met 11/19/2024 - unpredictable  Patient will be independent with pelvic floor relaxation to improve bowel evacuation. Goal not met - progressing   Patient will be able to demonstrate improved pelvic floor relaxation and contraction on rehabilitative ultrasound. Goal not met - progressing      Long Term Goals: 12 weeks   Patient will report bristol stool type 4 for bowel movements. Goal met - 01/07/2025 - "regular"  Patient will deny abdominal pain. Goal met 11/19/2024   Patient will be independent with core and pelvic floor awareness and relaxation. Goal not met - progressing   Patient will demonstrate adequate pelvic floor coordination with contraction and relaxation on sEMG vs rehabilitative ultrasound to allow for improved bowel habits. Goal not met - progressing     Plan     Monitor bowel evacuation. Using a pelvic wand, assess if this assists with bowel evacuation and PF mobility.     Shmuel Saul, PT                     "

## 2025-02-03 ENCOUNTER — CLINICAL SUPPORT (OUTPATIENT)
Dept: REHABILITATION | Facility: HOSPITAL | Age: 50
End: 2025-02-03
Payer: COMMERCIAL

## 2025-02-03 DIAGNOSIS — N81.89 PELVIC FLOOR WEAKNESS: ICD-10-CM

## 2025-02-03 DIAGNOSIS — R15.9 BOWEL AND BLADDER INCONTINENCE: ICD-10-CM

## 2025-02-03 DIAGNOSIS — R32 BOWEL AND BLADDER INCONTINENCE: ICD-10-CM

## 2025-02-03 DIAGNOSIS — N39.46 MIXED INCONTINENCE URGE AND STRESS: ICD-10-CM

## 2025-02-03 DIAGNOSIS — N39.41 URGE INCONTINENCE OF URINE: Primary | ICD-10-CM

## 2025-02-03 PROCEDURE — 97530 THERAPEUTIC ACTIVITIES: CPT | Mod: PN | Performed by: PHYSICAL THERAPIST

## 2025-02-03 NOTE — PROGRESS NOTES
Pelvic Health Physical Therapy   Treatment Note     Name: Thu Juares  Clinic Number: [id number]    Therapy Diagnosis:   Encounter Diagnoses   Name Primary?    Urge incontinence of urine Yes    Mixed incontinence urge and stress     Bowel and bladder incontinence     Pelvic floor weakness      Physician: Tami Pichardo NP    Visit Date: 2/3/2025    Physician Orders: PT Eval and Treat PF PT  Medical Diagnosis from Referral: G35 (ICD-10-CM) - Multiple sclerosis N39.41 (ICD-10-CM) - Urge incontinence of urine R32,R15.9 (ICD-10-CM) - Bowel and bladder incontinence  Evaluation Date: 8/21/2024  Authorization Period Expiration: 12/31/20024  Plan of Care Expiration: 2/12/2025  Progress Note Due: 2/7/2025  Visit # 12/12 Visits authorized: 4/10   FOTO: 4/3    Cancelled Visits: 0  No Show Visits: 0    Time In: 1:06 PM   Time Out: 1:59 PM    Total Billable Time: 53 minutes        Precautions: Standard    Subjective     Pt reports: Noticed a big improvement with vaginal suppositories. Skipped a few nights and could tell the difference. Seeing neurology on Wednesday.  Doing shots 1x a week for her hormones by MD in [place].    She was compliant with home exercise program.  Response to previous treatment: none  Functional change: none    Pain in: 0/10  Pain out: 0/10  Location:  none      Objective     Thu participated in dynamic functional therapeutic activities to improve functional performance for 53 minutes, including:  educated on possible long term management of PF tension - will discuss in the future with her providers. Educated on exercise and its benefits for fatigue and overall health.         Intervention 01/07/2025 01/13/2025 01/27/2025 02/03/2025    Neuro Re-Ed        Manual Ther Vaginal exam PF manual therapy with trigger point release to the bilateral levator ani. Limited pelvic floor contraction and mobility.  PF manual therapy with trigger point release to the bilateral levator ani. Improved pelvic  floor mobility noted, slight lift palpated.   PF manual therapy with trigger point release to the bilateral levator ani. With mild tenderness to palpation. External bulbocavernosus approximation.     [employer] Education educated on use of pelvic wand. Educated on monitor bowel habits with use of pelvic wand and suppositories.  educated on continued use of pelvic wand. Educated on monitor bowel habits with use of pelvic wand and suppositories. Goal is to prevent bloating and straining for bowel evacuation.  educated on continued use of pelvic wand. Will resume use of vaginal suppositories.  educated on possible long term management of PF tension - will discuss in the future with her providers. Educated on exercise and its benefits for fatigue and overall health.        Home Exercises Provided and Patient Education Provided     Education provided:   - anatomy/physiology of pelvic floor and posture/body mechanices  Discussed progression of plan of care with patient; educated pt in activity modification; reviewed HEP with pt. Pt demonstrated and verbalized understanding of all instruction and was provided with a handout of HEP (see Patient Instructions).    Written Home Exercises Provided: yes.  Exercises were reviewed and Thu was able to demonstrate them prior to the end of the session.  Thu demonstrated good  understanding of the education provided.     See EMR under Patient Instructions for exercises provided 02/03/2025 .    Assessment     Still with urgency and frequency. Improved pelvic floor mobility with vaginal exam and rehabilitative ultrasound. Sensation is intact.     Thu Is progressing well towards her goals.   Pt prognosis is Good.     Pt will continue to benefit from skilled outpatient physical therapy to address the deficits listed in the problem list box on initial evaluation, provide pt/family education and to maximize pt's level of independence in the home and community environment.     Pt's  "spiritual, cultural and educational needs considered and pt agreeable to plan of care and goals.     Anticipated barriers to physical therapy: none     Short Term Goals: 6 weeks   Patient will deny straining for bowel movements. Goal met 02/03/2025 - does have to strain when forgets her vaginal suppositories  Patient will deny fecal incontinence. Goal met 11/19/2024 - unpredictable  Patient will be independent with pelvic floor relaxation to improve bowel evacuation. Goal met 02/03/2025   Patient will be able to demonstrate improved pelvic floor relaxation and contraction on rehabilitative ultrasound. Goal not met - progressing      Long Term Goals: 12 weeks   Patient will report bristol stool type 4 for bowel movements. Goal met - 01/07/2025 - "regular"  Patient will deny abdominal pain. Goal met 11/19/2024   Patient will be independent with core and pelvic floor awareness and relaxation. Goal not met - progressing   Patient will demonstrate adequate pelvic floor coordination with contraction and relaxation on sEMG vs rehabilitative ultrasound to allow for improved bowel habits. Goal not met - progressing     Plan     Monitor bowel evacuation. Using a pelvic wand, assess if this assists with bowel evacuation and PF mobility.     Shmuel Saul, PT                       "

## 2025-02-05 ENCOUNTER — OFFICE VISIT (OUTPATIENT)
Dept: NEUROLOGY | Facility: CLINIC | Age: 50
End: 2025-02-05
Payer: COMMERCIAL

## 2025-02-05 VITALS
HEIGHT: 64 IN | HEART RATE: 73 BPM | SYSTOLIC BLOOD PRESSURE: 117 MMHG | WEIGHT: 137.56 LBS | BODY MASS INDEX: 23.49 KG/M2 | DIASTOLIC BLOOD PRESSURE: 83 MMHG

## 2025-02-05 DIAGNOSIS — G35 MULTIPLE SCLEROSIS: Primary | ICD-10-CM

## 2025-02-05 PROCEDURE — 99999 PR PBB SHADOW E&M-EST. PATIENT-LVL III: CPT | Mod: PBBFAC,,, | Performed by: STUDENT IN AN ORGANIZED HEALTH CARE EDUCATION/TRAINING PROGRAM

## 2025-02-05 PROCEDURE — 3008F BODY MASS INDEX DOCD: CPT | Mod: CPTII,S$GLB,, | Performed by: STUDENT IN AN ORGANIZED HEALTH CARE EDUCATION/TRAINING PROGRAM

## 2025-02-05 PROCEDURE — 99215 OFFICE O/P EST HI 40 MIN: CPT | Mod: S$GLB,,, | Performed by: STUDENT IN AN ORGANIZED HEALTH CARE EDUCATION/TRAINING PROGRAM

## 2025-02-05 PROCEDURE — 1159F MED LIST DOCD IN RCRD: CPT | Mod: CPTII,S$GLB,, | Performed by: STUDENT IN AN ORGANIZED HEALTH CARE EDUCATION/TRAINING PROGRAM

## 2025-02-05 PROCEDURE — 3074F SYST BP LT 130 MM HG: CPT | Mod: CPTII,S$GLB,, | Performed by: STUDENT IN AN ORGANIZED HEALTH CARE EDUCATION/TRAINING PROGRAM

## 2025-02-05 PROCEDURE — 3079F DIAST BP 80-89 MM HG: CPT | Mod: CPTII,S$GLB,, | Performed by: STUDENT IN AN ORGANIZED HEALTH CARE EDUCATION/TRAINING PROGRAM

## 2025-02-05 NOTE — PROGRESS NOTES
[facility]  Follow Up Patient Visit      Disease Summary     NEURO MULTIPLE SCLEROISIS SUMMARY:   Principle neurological diagnosis:  MS  Date of Symptom Onset:  8/2004  Date of Diagnosis:  10/2004  Disease type at diagnosis:  Relapsin-Remitting MS  Disease type currently:  Relapsing-Remitting MS  Previous Therapy:  First DMT:  Interferon beta-1b SQ - 2004 - ?  Second DMT:  Glatiramer acetate  Third DMT:  Interferon beta-1b SQ - 0492-4017  Fourth DMT:  Natalizumab - 6621-7814 (s/e)  Fifth DMT:  Interferon beta-1b SQ - 7604-4806 (breakthrough relapse)  Sixth DMT:  Other - mesenchymal stem cell transplant May 2024  Current Therapy:  Ocrelizumab - 2018 - present     Last MRI Brain:  1/17/2025 - stable  Last MRI C-Spine:  1/17/2025 - stable  Last MRI T-Spine:  1/17/2025 - stable  Labs   No CSF completed  No JCV completed    Vit D:   Lab Results   Component Value Date    CPLPWTUF63AT 29 (L) 01/15/2025    OYDHQPAU20HG 27 (L) 02/07/2024    EXQTQLLW92GB 30 11/15/2016       Interval history:     She feels tired all the time, her legs have spasms, she's going to pelvic floor PT for bladder incontinence. Her PT mentions that pelvic floor botox may help.     She also takes 20mg of baclofen at night, it does make her drowsy.     She did try CoQ10, but has not noticed difference yet.     She has been having stomach issues and has worked up with GI. Colonoscopy demonstrates mild colitis. She started feeling better  in October.     ROS:     SOCIAL HISTORY  Living arrangements - the patient lives with their family.  Social History     Socioeconomic History    Marital status:    Tobacco Use    Smoking status: Never    Smokeless tobacco: Former   Substance and Sexual Activity    Alcohol use: Not Currently    Drug use: Never    Sexual activity: Yes     Partners: Male     Social Drivers of Health     Financial Resource Strain: Low Risk  (7/21/2024)    Overall Financial Resource Strain (CARDIA)      Difficulty of Paying Living Expenses: Not hard at all   Food Insecurity: No Food Insecurity (7/21/2024)    Hunger Vital Sign     Worried About Running Out of Food in the Last Year: Never true     Ran Out of Food in the Last Year: Never true   Physical Activity: Inactive (7/21/2024)    Exercise Vital Sign     Days of Exercise per Week: 0 days     Minutes of Exercise per Session: 0 min   Stress: No Stress Concern Present (7/21/2024)    [employer] - Occupational Stress Questionnaire     Feeling of Stress : Only a little   Housing Stability: Unknown (7/21/2024)    Housing Stability Vital Sign     Unable to Pay for Housing in the Last Year: No       Patient Employment       Status   Retired                   2/4/2025    12:41 PM   REVIEW OF SYMPTOMS   Do you feel abnormally tired on most days? Yes    Do you feel you generally sleep well? No    Do you have difficulty controlling your bladder?  Yes    Do you have difficulty controlling your bowels?  Yes    Do you have frequent muscle cramps, tightness or spasms in your limbs?  Yes    Do you have new visual symptoms?  No    Do you have worsening difficulty with your memory or thinking? Yes    Do you have worsening symptoms of anxiety or depression?  Yes    For patients who walk, Do you have more difficulty walking?  No    Have you fallen since your last visit?  No    For patients who use wheelchairs: Do you have any skin wounds or breakdown? No    Do you have difficulty using your hands?  No    Do you have shooting or burning pain? No    Do you have difficulty with sexual function?  Yes    If you are sexually active, are you using birth control? Y/N  N/A No    Do you often choke when swallowing liquids or solid food?  No    Do you experience worsening symptoms when overheated? Yes    Do you need any new equipment such as a wheelchair, walker or shower chair? No    Do you receive co-pay financial assistance for your principal MS medicine?  Yes    Would you be interested in participating in an MS research trial in the future? Yes    For patients on Gilenya, Tecfidera, Aubagio, Rituxan, Ocrevus, Tysabri, Lemtrada or Methotrexate, are you aware that you should NOT receive live virus vaccines?  Yes    Do you feel you have adequate family/friend support?  Yes    Do you have health insurance?   Yes    Are you currently employed? Yes    Do you receive SSDI/SSI?  No    Do you use marijuana or cannabis products? No    Have you been diagnosed with a urinary tract infection since your last visit here? Yes    Have you been diagnosed with a respiratory tract infection since your last visit here? Yes    Have you been to the emergency room since your last visit here? No    Have you been hospitalized since your last visit here?  No        Patient-reported         7/21/2024    12:10 PM   FSS SCORE & INTERPRETATION   FSS SCORE  42   FSS SCORE INTERPRETATION May be suffering from fatigue         7/21/2024    12:08 PM   MS WILLIAMS-D SCORE & INTERPRETATION   WILLIAMS-D SCORE  21   WILLIAMS-D INTERPRETATION  Mild to moderate Depression         7/21/2024    12:06 PM   MS TR-7 SCORE & INTERPRETATION   TR-7 SCORE  8   TR-7 SCORE INTERPRETATION Mild Anxiety         7/21/2024    12:11 PM   PEQ MS MOS PAIN EFFECTS SCORE & INTERPRETATION   PES SCORE 11   PES SCORE INTERPRETATION Scores can range from 6-30.  Items are scaled so that higher scores indicate a greater impact of pain on a patients mood and behavior.         7/21/2024    12:13 PM   PEQ MS SEXUAL SATISFACTION SCORE & INTERPRETATION   SSS SCORE  9   SSS SCORE INTERPRETATION Scores can range from 4-24.  Higher scores indicate greater problems with sexual satisfaction.         7/21/2024    12:14 PM   MS BLADDER CONTROL SCORE & INTERPRETATION   BLCS SCORE 8   BLCS SCORE INTERPRETATION  Scores can range from 0-22, with higher scores indicating greater bladder control problems.         7/21/2024    12:20 PM   MS BOWEL CONTROL SCORE &  "INTERPRETATION   BWCS SCORE 18   BWCS SCORE INTERPRETATION Scores can range from 0-26, with higher scores indicating greater bowel control problems.         7/21/2024    12:15 PM   PEQ MS IMPACT OF VISUAL IMPAIRMENT SCORE & INTERPRETATION   AYDEE SCALE SCORE  0   AYDEE SCORE INTERPRETATION Scores can range from 0-15, with higher scores indicating greater impact of visual problems on daily activites.         7/21/2024    12:17 PM   MS PDQ SCORE & INTERPRETATION   PDQ RETROSPECTIVE MEMORY SUBSCALE 7   PDQ ATTENTION/CONCENTRATION SUBSCALE 7   PDQ PROSPECTIVE MEMORY SUBSCALE 3   PDQ PLANNING/ORGANIZATION SUBSCALE 5   PDQ TOTAL SCORE 22   PDQ SCORE INTERPRETATION Scores can range from 0-80, with higher scores indicating greater perceived cognitive impairment.         7/21/2024    12:22 PM   MSSS SCORE & INTERPRETATION   MSSS TANGIBLE SUPPORT SUBSCALE 81.25   MSSS EMOTIONAL/INFORMATIONAL SUPPORT SUBSCALE 100   MSSS AFFECTIONATE SUPPORT SUBSCALE 100   MSSS POSITIVE SOCIAL INTERACTION SUBSCALE 100   MSSS TOTAL SCORE 95.31   MSSS SCORE INTERPRETATION Scores can range from 0-100, with higher scores indicating greater perceived support.       Exam:     Vitals:    02/05/25 1208   BP: 117/83   BP Location: Left arm   Patient Position: Sitting   Pulse: 73   Weight: 62.4 kg (137 lb 9.1 oz)   Height: 5' 4" (1.626 m)          In general, the patient is well nourished and appears to be in no acute distress.    MENTAL STATUS: language is fluent, normal verbal comprehension, short-term and remote memory is intact, attention is normal, patient is alert and oriented x 3, fund of knowlege is appropriate by vocabulary. Behavior and judgment appropriate.     CRANIAL NERVE EXAM:  There is no intrernuclear ophthalmoplegia.  Extraocular muscles are intact. No facial asymmetry. Facial sensation is decreased to PP in V2/V3 bilaterally. There is no dysarthria. Uvula is midline, and palate moves symmetrically. Shoulder shrug intact bilaterlly. " Tongue protrusion is midline. Hearing is intact to finger rub bilaterally. Neck is supple with full ROM    MOTOR EXAM: Normal bulk and tone throughout UE and LE bilaterally.   No pronator drift; rapid sequential movements are normal; Strength is  5/5 in all groups in the lower extremities and upper extremities    SENSORY EXAM: Decreased LT in RLE, decreased PP from face down b/l (worse in RLE)    COORDINATION: Normal finger-to-nose exam. Normal heel-to-shin exam.    GAIT: Narrow based and stable. Able to heel walk, toe walk, and perform tandem gait.     Negative Romberg's        7/22/2024     8:30 AM 8/15/2024     2:00 PM 2/5/2025    12:00 PM   Timed 25 Foot Walk:   Did patient wear an AFO? No No No   Was assistive device used? No No No   Time for 25 Foot Walk (seconds) 5.83 5.78 5.91   Time for 25 Foot Walk (seconds) 6 5.61 5.75       Imaging (personally reviewed):     Results for orders placed during the hospital encounter of 01/17/25    MRI Brain Demyelinating Without Contrast    Impression  The brain and spinal cord appear unchanged from prior exam, again demonstrating findings compatible with the reported history of multiple sclerosis.  No new discrete lesions identified to indicate ongoing demyelination.      Electronically signed by: Jordan Jacobson MD  Date:    01/17/2025  Time:    12:16    Results for orders placed during the hospital encounter of 01/17/25    MRI Cervical Spine Demyelinating Without Contrast    Impression  The brain and spinal cord appear unchanged from prior exam, again demonstrating findings compatible with the reported history of multiple sclerosis.  No new discrete lesions identified to indicate ongoing demyelination.      Electronically signed by: Jordan Jacobson MD  Date:    01/17/2025  Time:    12:16    Results for orders placed during the hospital encounter of 01/17/25    MRI Thoracic Spine Demyelinating Without Contrast    Impression  The brain and spinal cord appear unchanged from  "prior exam, again demonstrating findings compatible with the reported history of multiple sclerosis.  No new discrete lesions identified to indicate ongoing demyelination.      Electronically signed by: Jordan Jacobson MD  Date:    01/17/2025  Time:    12:16        Labs:     Lab Results   Component Value Date    CJTMTGIZ54AB 29 (L) 01/15/2025    NCCFDJXW12BF 27 (L) 02/07/2024    AYWMPMOL55SS 30 11/15/2016     No results found for: "JCVINDEX", "JCVANTIBODY"  No results found for: "HY3EGUSF", "ABSOLUTECD3", "UU7WZFUV", "ABSOLUTECD8", "WT6KOLOJ", "ABSOLUTECD4", "LABCD48"  Lab Results   Component Value Date    WBC 5.74 01/15/2025    RBC 5.17 01/15/2025    HGB 15.3 01/15/2025    HCT 46.5 01/15/2025    MCV 90 01/15/2025    MCH 29.6 01/15/2025    MCHC 32.9 01/15/2025    RDW 11.7 01/15/2025     01/15/2025    MPV 9.1 (L) 01/15/2025    GRAN 4.2 01/15/2025    GRAN 73.7 (H) 01/15/2025    LYMPH 0.9 (L) 01/15/2025    LYMPH 15.7 (L) 01/15/2025    MONO 0.4 01/15/2025    MONO 7.5 01/15/2025    EOS 0.1 01/15/2025    BASO 0.03 01/15/2025    EOSINOPHIL 1.2 01/15/2025    BASOPHIL 0.5 01/15/2025     Sodium   Date Value Ref Range Status   01/15/2025 143 136 - 145 mmol/L Final     Potassium   Date Value Ref Range Status   01/15/2025 4.2 3.5 - 5.1 mmol/L Final     Chloride   Date Value Ref Range Status   01/15/2025 107 95 - 110 mmol/L Final     CO2   Date Value Ref Range Status   01/15/2025 27 23 - 29 mmol/L Final     Glucose   Date Value Ref Range Status   01/15/2025 98 70 - 110 mg/dL Final     BUN   Date Value Ref Range Status   01/15/2025 12 6 - 20 mg/dL Final     Creatinine   Date Value Ref Range Status   01/15/2025 0.8 0.5 - 1.4 mg/dL Final     Calcium   Date Value Ref Range Status   01/15/2025 9.5 8.7 - 10.5 mg/dL Final     Total Protein   Date Value Ref Range Status   01/15/2025 7.2 6.0 - 8.4 g/dL Final     Albumin   Date Value Ref Range Status   01/15/2025 4.1 3.5 - 5.2 g/dL Final     Total Bilirubin   Date Value Ref Range " Status   01/15/2025 0.5 0.1 - 1.0 mg/dL Final     Comment:     For infants and newborns, interpretation of results should be based  on gestational age, weight and in agreement with clinical  observations.    Premature Infant recommended reference ranges:  Up to 24 hours.............<8.0 mg/dL  Up to 48 hours............<12.0 mg/dL  3-5 days..................<15.0 mg/dL  6-29 days.................<15.0 mg/dL       Alkaline Phosphatase   Date Value Ref Range Status   01/15/2025 91 40 - 150 U/L Final     AST   Date Value Ref Range Status   01/15/2025 15 10 - 40 U/L Final     ALT   Date Value Ref Range Status   01/15/2025 15 10 - 44 U/L Final     Anion Gap   Date Value Ref Range Status   01/15/2025 9 8 - 16 mmol/L Final       Diagnosis/Assessment/Plan:       NEURO MULTIPLE SCLEROSIS IMPRESSION:   Number of relapses in the past year?:  0  Clinical Progression:  Clinically Stable  MRI Progression:  Stable  MS Classification:  Relapsing-Remitting MS  Current DMT: ocrelizumab  DMT:  Switch Disease Modifying therapy  Symptom Management:  No change in symptom management  Additional Impressions:     Assessment: RRMS stable on Ocrevus so far on exam and imaging, received mesenchymal stem cell therapy (not autologous) with unclear benefits in May 2024. She is unable to tolerate Ocrevus due to colitis.   MS ROS reviewed.   Imaging: Repeat in 6 months   DMT: Switch to Mavenclad, will need updated screening labs and patient will reach out to her PCP regarding skin exam    Symptoms:  -Vitamin D supplement  -Discussed CoQ10 dose - 400-600mg daily  -Will check with her GI regarding colitis and colonoscopy results    Discussed brain health measures  Plan discussed and questions were answered to satisfaction.  Return to clinic in 3 months          Shanon Elizabeth MD, MSc  Attending neurologist

## 2025-02-05 NOTE — Clinical Note
Hi, the patient is asking about her colonoscopy results, are you able to reach out to her? Thank you.

## 2025-02-06 ENCOUNTER — PATIENT MESSAGE (OUTPATIENT)
Dept: GASTROENTEROLOGY | Facility: CLINIC | Age: 50
End: 2025-02-06
Payer: COMMERCIAL

## 2025-02-10 ENCOUNTER — CLINICAL SUPPORT (OUTPATIENT)
Dept: REHABILITATION | Facility: HOSPITAL | Age: 50
End: 2025-02-10
Payer: COMMERCIAL

## 2025-02-10 DIAGNOSIS — N39.46 MIXED INCONTINENCE URGE AND STRESS: ICD-10-CM

## 2025-02-10 DIAGNOSIS — N81.89 PELVIC FLOOR WEAKNESS: ICD-10-CM

## 2025-02-10 DIAGNOSIS — N39.41 URGE INCONTINENCE OF URINE: Primary | ICD-10-CM

## 2025-02-10 DIAGNOSIS — R15.9 BOWEL AND BLADDER INCONTINENCE: ICD-10-CM

## 2025-02-10 DIAGNOSIS — R32 BOWEL AND BLADDER INCONTINENCE: ICD-10-CM

## 2025-02-10 PROCEDURE — 97530 THERAPEUTIC ACTIVITIES: CPT | Mod: PN | Performed by: PHYSICAL THERAPIST

## 2025-02-10 PROCEDURE — 97112 NEUROMUSCULAR REEDUCATION: CPT | Mod: PN | Performed by: PHYSICAL THERAPIST

## 2025-02-10 NOTE — PROGRESS NOTES
Outpatient Rehab    Physical Therapy Visit    Patient Name: Thu Juares  MRN: [id number]  YOB: 1975  Today's Date: 2/11/2025    Therapy Diagnosis:   Encounter Diagnoses   Name Primary?    Urge incontinence of urine Yes    Mixed incontinence urge and stress     Bowel and bladder incontinence     Pelvic floor weakness      Physician: Tami Pichardo NP    Physician Orders: Eval and Treat  Medical Diagnosis: Mixed incontinence urge and stress    Visit # 13/16 Visits Authorized:  5 / 10   Date of Evaluation:  : 8/21/2024  Insurance Authorization Period: 1/1/2025 to 12/31/2025  Plan of Care Certification:  2/10/2025 to 5/6/2025      Time In: 1306   Time Out: 1355  Total Time: 49   Total Billable Time: 49    FOTO:  Intake Score:  %  Survey Score 1:  %  Survey Score 2:  %         Subjective             Objective            Treatment:            Balance/Neuromuscular Re-Education  Balance/Neuromuscular Re-Education Activity 1: Perineal RUSI performed with education on PF coordination. Improved PF contraction noted, still with impaired PF mobility; however, she has spent some days without a suppository recently.    Therapeutic Activity  Therapeutic Activity 1: Encouraged improved compliance with suppository use for better carryover with bowel evacuation.                   Assessment & Plan   Assessment: Pt continues with limited pf mobility. Responding very well to vaginal suppositories. needs better strength and endurance training to further assist.  Evaluation/Treatment Tolerance: Patient tolerated treatment well    Patient will continue to benefit from skilled outpatient physical therapy to address the deficits listed in the problem list box on initial evaluation, provide pt/family education and to maximize pt's level of independence in the home and community environment.     Patient's spiritual, cultural, and educational needs considered and patient agreeable to plan of care and goals.            Plan: Continue with progressive PF mobility and coordination training.    Goals:   Active       LTG       Patient will report bristol stool type 4 for bowel movements.  (Met)       Start:  02/11/25    Expected End:  05/06/25    Resolved:  02/11/25         Patient will deny abdominal pain.  (Not Progressing)       Start:  02/11/25    Expected End:  05/06/25            Patient will be independent with core and pelvic floor awareness and relaxation.  (Progressing)       Start:  02/11/25    Expected End:  05/06/25            Patient will demonstrate adequate pelvic floor coordination with contraction and relaxation on sEMG vs rehabilitative ultrasound to allow for improved bowel habits.  (Progressing)       Start:  02/11/25    Expected End:  05/06/25               STG       Patient will deny straining for bowel movements.  (Met)       Start:  02/11/25    Expected End:  05/06/25    Resolved:  02/11/25         Patient will deny fecal incontinence.  (Met)       Start:  02/11/25    Expected End:  05/06/25    Resolved:  02/11/25         Patient will be independent with pelvic floor relaxation to improve bowel evacuation.  (Met)       Start:  02/11/25    Expected End:  05/06/25    Resolved:  02/11/25         Patient will be able to demonstrate improved pelvic floor relaxation and contraction on rehabilitative ultrasound.  (Progressing)       Start:  02/11/25    Expected End:  05/06/25                Shmuel Saul, PT

## 2025-02-11 ENCOUNTER — TELEPHONE (OUTPATIENT)
Dept: NEUROLOGY | Facility: CLINIC | Age: 50
End: 2025-02-11
Payer: COMMERCIAL

## 2025-02-11 DIAGNOSIS — G35 MULTIPLE SCLEROSIS: Primary | ICD-10-CM

## 2025-02-11 NOTE — TELEPHONE ENCOUNTER
----- Message from Shanon Elizabeth MD sent at 2/5/2025  5:23 PM CST -----  FYI switching to mavenclad because of colitis.

## 2025-02-11 NOTE — TELEPHONE ENCOUNTER
Mavenclad new start     Dosing:   Year 1- weight 62.4 kg   First cycle (month 1)  Day 1: Take 2 tablets daily  Day 2-5: Take 1 tablet daily  6 tablets in total    Second cycle (month 2)  Day 1: Take 2 tablets daily  Day 2-5: Take 1 tablet daily  6 tablets in total     Labs   1/15/25  WBC 5.74    AST 15, ALT 15  IgG  952  IgM  33  IgA 132  HBsAg - anti-Hbc - , no current or past infection    CBC  Hep C Ab    HIV   Tuberculosis    Cancer screenings:  Low-dose CT scan -   not a smoker  Colonoscopy - 10/12/2024  Mammogram -  overdue, last completed 1/2024  HPV -?     Vaccines:  N/a on vaccines    Start Form:   Pending labs    [place]:   Start ASAP     Contraception:  Need to clarify with patient    Drug interactions:   No interactions with current medication list and Mavenclad

## 2025-02-13 ENCOUNTER — TELEPHONE (OUTPATIENT)
Dept: NEUROLOGY | Facility: CLINIC | Age: 50
End: 2025-02-13
Payer: COMMERCIAL

## 2025-02-18 ENCOUNTER — PATIENT MESSAGE (OUTPATIENT)
Dept: UROGYNECOLOGY | Facility: CLINIC | Age: 50
End: 2025-02-18
Payer: COMMERCIAL

## 2025-02-18 DIAGNOSIS — R30.0 DYSURIA: Primary | ICD-10-CM

## 2025-02-19 ENCOUNTER — LAB VISIT (OUTPATIENT)
Dept: LAB | Facility: HOSPITAL | Age: 50
End: 2025-02-19
Attending: STUDENT IN AN ORGANIZED HEALTH CARE EDUCATION/TRAINING PROGRAM
Payer: COMMERCIAL

## 2025-02-19 DIAGNOSIS — G35 MULTIPLE SCLEROSIS: ICD-10-CM

## 2025-02-19 DIAGNOSIS — R30.0 DYSURIA: ICD-10-CM

## 2025-02-19 DIAGNOSIS — E55.9 VITAMIN D DEFICIENCY, UNSPECIFIED: ICD-10-CM

## 2025-02-19 LAB
BACTERIA #/AREA URNS HPF: ABNORMAL /HPF
BASOPHILS # BLD AUTO: 0.06 K/UL (ref 0–0.2)
BASOPHILS NFR BLD: 0.7 % (ref 0–1.9)
BILIRUB UR QL STRIP: NEGATIVE
CLARITY UR: CLEAR
COLOR UR: YELLOW
DIFFERENTIAL METHOD BLD: ABNORMAL
EOSINOPHIL # BLD AUTO: 0.2 K/UL (ref 0–0.5)
EOSINOPHIL NFR BLD: 2 % (ref 0–8)
ERYTHROCYTE [DISTWIDTH] IN BLOOD BY AUTOMATED COUNT: 12 % (ref 11.5–14.5)
GLUCOSE UR QL STRIP: NEGATIVE
HCG INTACT+B SERPL-ACNC: <1.2 MIU/ML
HCT VFR BLD AUTO: 45.2 % (ref 37–48.5)
HGB BLD-MCNC: 15 G/DL (ref 12–16)
HGB UR QL STRIP: ABNORMAL
IMM GRANULOCYTES # BLD AUTO: 0.02 K/UL (ref 0–0.04)
IMM GRANULOCYTES NFR BLD AUTO: 0.2 % (ref 0–0.5)
KETONES UR QL STRIP: ABNORMAL
LEUKOCYTE ESTERASE UR QL STRIP: ABNORMAL
LYMPHOCYTES # BLD AUTO: 1.6 K/UL (ref 1–4.8)
LYMPHOCYTES NFR BLD: 17.4 % (ref 18–48)
MCH RBC QN AUTO: 29.9 PG (ref 27–31)
MCHC RBC AUTO-ENTMCNC: 33.2 G/DL (ref 32–36)
MCV RBC AUTO: 90 FL (ref 82–98)
MICROSCOPIC COMMENT: ABNORMAL
MONOCYTES # BLD AUTO: 0.8 K/UL (ref 0.3–1)
MONOCYTES NFR BLD: 8.6 % (ref 4–15)
NEUTROPHILS # BLD AUTO: 6.5 K/UL (ref 1.8–7.7)
NEUTROPHILS NFR BLD: 71.1 % (ref 38–73)
NITRITE UR QL STRIP: NEGATIVE
NRBC BLD-RTO: 0 /100 WBC
PH UR STRIP: 6 [PH] (ref 5–8)
PLATELET # BLD AUTO: 289 K/UL (ref 150–450)
PMV BLD AUTO: 8.9 FL (ref 9.2–12.9)
PROT UR QL STRIP: NEGATIVE
RBC # BLD AUTO: 5.02 M/UL (ref 4–5.4)
RBC #/AREA URNS HPF: 3 /HPF (ref 0–4)
SP GR UR STRIP: >=1.03 (ref 1–1.03)
SQUAMOUS #/AREA URNS HPF: 1 /HPF
URN SPEC COLLECT METH UR: ABNORMAL
UROBILINOGEN UR STRIP-ACNC: NEGATIVE EU/DL
WBC # BLD AUTO: 9.07 K/UL (ref 3.9–12.7)
WBC #/AREA URNS HPF: 15 /HPF (ref 0–5)

## 2025-02-19 PROCEDURE — 36415 COLL VENOUS BLD VENIPUNCTURE: CPT | Performed by: STUDENT IN AN ORGANIZED HEALTH CARE EDUCATION/TRAINING PROGRAM

## 2025-02-19 PROCEDURE — 84702 CHORIONIC GONADOTROPIN TEST: CPT | Performed by: STUDENT IN AN ORGANIZED HEALTH CARE EDUCATION/TRAINING PROGRAM

## 2025-02-19 PROCEDURE — 81000 URINALYSIS NONAUTO W/SCOPE: CPT | Performed by: NURSE PRACTITIONER

## 2025-02-19 PROCEDURE — 85025 COMPLETE CBC W/AUTO DIFF WBC: CPT | Performed by: STUDENT IN AN ORGANIZED HEALTH CARE EDUCATION/TRAINING PROGRAM

## 2025-02-19 PROCEDURE — 86803 HEPATITIS C AB TEST: CPT | Performed by: STUDENT IN AN ORGANIZED HEALTH CARE EDUCATION/TRAINING PROGRAM

## 2025-02-19 PROCEDURE — 87389 HIV-1 AG W/HIV-1&-2 AB AG IA: CPT | Performed by: STUDENT IN AN ORGANIZED HEALTH CARE EDUCATION/TRAINING PROGRAM

## 2025-02-19 PROCEDURE — 87086 URINE CULTURE/COLONY COUNT: CPT | Performed by: NURSE PRACTITIONER

## 2025-02-20 LAB
HCV AB SERPL QL IA: NORMAL
HIV 1+2 AB+HIV1 P24 AG SERPL QL IA: NORMAL

## 2025-02-20 RX ORDER — ERGOCALCIFEROL 1.25 MG/1
CAPSULE ORAL
Qty: 12 CAPSULE | Refills: 3 | Status: SHIPPED | OUTPATIENT
Start: 2025-02-20

## 2025-02-21 ENCOUNTER — LAB VISIT (OUTPATIENT)
Dept: LAB | Facility: HOSPITAL | Age: 50
End: 2025-02-21
Attending: STUDENT IN AN ORGANIZED HEALTH CARE EDUCATION/TRAINING PROGRAM
Payer: COMMERCIAL

## 2025-02-21 ENCOUNTER — PATIENT MESSAGE (OUTPATIENT)
Dept: UROGYNECOLOGY | Facility: CLINIC | Age: 50
End: 2025-02-21
Payer: COMMERCIAL

## 2025-02-21 DIAGNOSIS — G35 MULTIPLE SCLEROSIS: ICD-10-CM

## 2025-02-21 LAB
BACTERIA UR CULT: NORMAL
BACTERIA UR CULT: NORMAL

## 2025-02-21 PROCEDURE — 86480 TB TEST CELL IMMUN MEASURE: CPT | Performed by: STUDENT IN AN ORGANIZED HEALTH CARE EDUCATION/TRAINING PROGRAM

## 2025-02-22 LAB
GAMMA INTERFERON BACKGROUND BLD IA-ACNC: 0 IU/ML
M TB IFN-G CD4+ BCKGRND COR BLD-ACNC: -0 IU/ML
M TB IFN-G CD4+ BCKGRND COR BLD-ACNC: 0.01 IU/ML
MITOGEN IGNF BCKGRD COR BLD-ACNC: 9.77 IU/ML
TB GOLD PLUS: NEGATIVE

## 2025-02-25 ENCOUNTER — PATIENT MESSAGE (OUTPATIENT)
Dept: UROGYNECOLOGY | Facility: CLINIC | Age: 50
End: 2025-02-25
Payer: COMMERCIAL

## 2025-02-27 ENCOUNTER — TELEPHONE (OUTPATIENT)
Dept: UROGYNECOLOGY | Facility: CLINIC | Age: 50
End: 2025-02-27
Payer: COMMERCIAL

## 2025-02-27 NOTE — TELEPHONE ENCOUNTER
Contacted patient to inform next available at [facility] is 4/2- patient declined and stated that is too long of a wait. Will notify FRANCESCO Mendes.     ----- Message from Belkis sent at 2/27/2025  9:09 AM CST -----  Regarding: Reschedule Appt  Contact: Patient  Type:  Sooner Apoointment RequestCaller is requesting a sooner appointment.  Caller declined first available appointment listed below.  Caller will not accept being placed on the waitlist and is requesting a message be sent to doctor.Name of Caller:Patient When is the first available appointment? 06/11/2025Symptoms:follow up Would the patient rather a call back or a response via [facility]? Call back Best Call Back Number: [phone] Information: Patient is requesting a call back to reschedule appt on 03/05/2025 Patient stated she will be out of town and would like to be rescheduled for a the following week Please Assist

## 2025-02-28 ENCOUNTER — PATIENT MESSAGE (OUTPATIENT)
Dept: REHABILITATION | Facility: HOSPITAL | Age: 50
End: 2025-02-28
Payer: COMMERCIAL

## 2025-03-07 ENCOUNTER — PATIENT MESSAGE (OUTPATIENT)
Dept: PSYCHIATRY | Facility: CLINIC | Age: 50
End: 2025-03-07
Payer: COMMERCIAL

## 2025-03-27 ENCOUNTER — PATIENT MESSAGE (OUTPATIENT)
Dept: NEUROLOGY | Facility: CLINIC | Age: 50
End: 2025-03-27
Payer: COMMERCIAL

## 2025-03-27 ENCOUNTER — CLINICAL SUPPORT (OUTPATIENT)
Dept: REHABILITATION | Facility: HOSPITAL | Age: 50
End: 2025-03-27
Payer: COMMERCIAL

## 2025-03-27 DIAGNOSIS — R32 BOWEL AND BLADDER INCONTINENCE: ICD-10-CM

## 2025-03-27 DIAGNOSIS — R11.0 NAUSEA: Primary | ICD-10-CM

## 2025-03-27 DIAGNOSIS — N39.46 MIXED INCONTINENCE URGE AND STRESS: ICD-10-CM

## 2025-03-27 DIAGNOSIS — R15.9 BOWEL AND BLADDER INCONTINENCE: ICD-10-CM

## 2025-03-27 DIAGNOSIS — N39.41 URGE INCONTINENCE OF URINE: Primary | ICD-10-CM

## 2025-03-27 DIAGNOSIS — N81.89 PELVIC FLOOR WEAKNESS: ICD-10-CM

## 2025-03-27 PROCEDURE — 97530 THERAPEUTIC ACTIVITIES: CPT | Mod: PN | Performed by: PHYSICAL THERAPIST

## 2025-03-27 NOTE — PROGRESS NOTES
Outpatient Rehab    Physical Therapy Discharge    Patient Name: Thu Juares  MRN: [id number]  YOB: 1975  Encounter Date: 3/27/2025    Therapy Diagnosis:   Encounter Diagnoses   Name Primary?    Urge incontinence of urine Yes    Mixed incontinence urge and stress     Bowel and bladder incontinence     Pelvic floor weakness      Physician: Tami Pichardo NP    Physician Orders: Eval and Treat  Medical Diagnosis: Multiple sclerosis  Urge incontinence of urine  Bowel and bladder incontinence    Visit # / Visits Authorized:  6 / 10  Insurance Authorization Period: 1/1/2025 to 12/31/2025  Date of Evaluation: 8/21/2024   Plan of Care Certification:  2/10/2025 to 5/6/2025      PT/PTA:     Number of PTA visits since last PT visit:   Time In: 1533   Time Out: 1601  Total Time: 28   Total Billable Time:  28    FOTO:  Intake Score:  %  Survey Score 1:  %  Survey Score 2:  %         Subjective   started a new medication for her MS. Having GI issues. Started with diarrhea on day 4 that has not stopped for the past 5 days. Having fecal incontinence. She did have a few episodes of feeling like she had a UTI and it was not a UTI. Symptoms did resolve. Denies leakage of urine, only at times when she cannot hold it. Had fecal incontinence of liquid stool..  Pain reported as 0/10.      Objective            Treatment:  Therapeutic Activity  TA 2: Educated on bowel management. Suggested she speak with a provider for OTC meds to assist. May also consider stopping magnesium until things improve.    Time Entry(in minutes):  Therapeutic Activity Time Entry: 28    Assessment & Plan   Assessment: Patient's bladder control has improved. Now suffering with significant diarrhea. She has started a new medication for her MS, but the nurse assigned to her does not feel this is an expected side effect. She voices good understanding of physical therapist recommendations. I have encouraged pelvic floor mobility and awareness to  prevent UTI like symptoms.       Patient's spiritual, cultural, and educational needs considered and patient agreeable to plan of care and goals.           Plan: Discharge physical therapy at this time.    Goals:   Active       LTG       Patient will report bristol stool type 4 for bowel movements.  (Met)       Start:  02/11/25    Expected End:  05/06/25    Resolved:  02/11/25         Patient will deny abdominal pain.  (Met)       Start:  02/11/25    Expected End:  05/06/25    Resolved:  03/27/25         Patient will be independent with core and pelvic floor awareness and relaxation.  (Met)       Start:  02/11/25    Expected End:  05/06/25    Resolved:  03/28/25         Patient will demonstrate adequate pelvic floor coordination with contraction and relaxation on sEMG vs rehabilitative ultrasound to allow for improved bowel habits.  (Unable to Meet)       Start:  02/11/25    Expected End:  05/06/25              Resolved       STG       Patient will deny straining for bowel movements.  (Met)       Start:  02/11/25    Expected End:  05/06/25    Resolved:  02/11/25         Patient will deny fecal incontinence.  (Met)       Start:  02/11/25    Expected End:  05/06/25    Resolved:  02/11/25         Patient will be independent with pelvic floor relaxation to improve bowel evacuation.  (Met)       Start:  02/11/25    Expected End:  05/06/25    Resolved:  02/11/25         Patient will be able to demonstrate improved pelvic floor relaxation and contraction on rehabilitative ultrasound.  (Met)       Start:  02/11/25    Expected End:  05/06/25    Resolved:  03/28/25             Shmuel Saul, PT

## 2025-03-28 RX ORDER — ONDANSETRON 4 MG/1
4 TABLET, ORALLY DISINTEGRATING ORAL EVERY 12 HOURS PRN
Qty: 20 TABLET | Refills: 0 | Status: SHIPPED | OUTPATIENT
Start: 2025-03-28

## 2025-04-08 ENCOUNTER — LAB VISIT (OUTPATIENT)
Dept: LAB | Facility: HOSPITAL | Age: 50
End: 2025-04-08
Attending: STUDENT IN AN ORGANIZED HEALTH CARE EDUCATION/TRAINING PROGRAM
Payer: COMMERCIAL

## 2025-04-08 DIAGNOSIS — G35 MULTIPLE SCLEROSIS: ICD-10-CM

## 2025-04-08 LAB
ALBUMIN SERPL BCP-MCNC: 4.3 G/DL (ref 3.5–5.2)
ALP SERPL-CCNC: 82 UNIT/L (ref 40–150)
ALT SERPL W/O P-5'-P-CCNC: 24 UNIT/L (ref 10–44)
AST SERPL-CCNC: 19 UNIT/L (ref 11–45)
BILIRUB DIRECT SERPL-MCNC: 0.2 MG/DL (ref 0.1–0.3)
BILIRUB SERPL-MCNC: 0.5 MG/DL (ref 0.1–1)
PROT SERPL-MCNC: 7.1 GM/DL (ref 6–8.4)

## 2025-04-08 PROCEDURE — 36415 COLL VENOUS BLD VENIPUNCTURE: CPT

## 2025-04-08 PROCEDURE — 84450 TRANSFERASE (AST) (SGOT): CPT

## 2025-04-22 ENCOUNTER — TELEPHONE (OUTPATIENT)
Dept: NEUROLOGY | Facility: CLINIC | Age: 50
End: 2025-04-22
Payer: COMMERCIAL

## 2025-05-02 ENCOUNTER — LAB VISIT (OUTPATIENT)
Dept: LAB | Facility: HOSPITAL | Age: 50
End: 2025-05-02
Attending: STUDENT IN AN ORGANIZED HEALTH CARE EDUCATION/TRAINING PROGRAM
Payer: COMMERCIAL

## 2025-05-02 DIAGNOSIS — G35 MULTIPLE SCLEROSIS: ICD-10-CM

## 2025-05-02 LAB
ABSOLUTE EOSINOPHIL (OHS): 0.07 K/UL
ABSOLUTE MONOCYTE (OHS): 0.62 K/UL (ref 0.3–1)
ABSOLUTE NEUTROPHIL COUNT (OHS): 5.04 K/UL (ref 1.8–7.7)
ALBUMIN SERPL BCP-MCNC: 4 G/DL (ref 3.5–5.2)
ALP SERPL-CCNC: 84 UNIT/L (ref 40–150)
ALT SERPL W/O P-5'-P-CCNC: 20 UNIT/L (ref 10–44)
ANION GAP (OHS): 5 MMOL/L (ref 8–16)
AST SERPL-CCNC: 16 UNIT/L (ref 11–45)
BASOPHILS # BLD AUTO: 0.02 K/UL
BASOPHILS NFR BLD AUTO: 0.3 %
BILIRUB SERPL-MCNC: 0.6 MG/DL (ref 0.1–1)
BUN SERPL-MCNC: 13 MG/DL (ref 6–20)
CALCIUM SERPL-MCNC: 8.9 MG/DL (ref 8.7–10.5)
CHLORIDE SERPL-SCNC: 107 MMOL/L (ref 95–110)
CO2 SERPL-SCNC: 30 MMOL/L (ref 23–29)
CREAT SERPL-MCNC: 0.8 MG/DL (ref 0.5–1.4)
ERYTHROCYTE [DISTWIDTH] IN BLOOD BY AUTOMATED COUNT: 12.5 % (ref 11.5–14.5)
GFR SERPLBLD CREATININE-BSD FMLA CKD-EPI: >60 ML/MIN/1.73/M2
GLUCOSE SERPL-MCNC: 101 MG/DL (ref 70–110)
HCT VFR BLD AUTO: 44.6 % (ref 37–48.5)
HGB BLD-MCNC: 15.4 GM/DL (ref 12–16)
IMM GRANULOCYTES # BLD AUTO: 0.02 K/UL (ref 0–0.04)
IMM GRANULOCYTES NFR BLD AUTO: 0.3 % (ref 0–0.5)
LYMPHOCYTES # BLD AUTO: 0.44 K/UL (ref 1–4.8)
MCH RBC QN AUTO: 31.4 PG (ref 27–31)
MCHC RBC AUTO-ENTMCNC: 34.5 G/DL (ref 32–36)
MCV RBC AUTO: 91 FL (ref 82–98)
NUCLEATED RBC (/100WBC) (OHS): 0 /100 WBC
PLATELET # BLD AUTO: 258 K/UL (ref 150–450)
PMV BLD AUTO: 8.4 FL (ref 9.2–12.9)
POTASSIUM SERPL-SCNC: 4.2 MMOL/L (ref 3.5–5.1)
PROT SERPL-MCNC: 6.9 GM/DL (ref 6–8.4)
RBC # BLD AUTO: 4.91 M/UL (ref 4–5.4)
RELATIVE EOSINOPHIL (OHS): 1.1 %
RELATIVE LYMPHOCYTE (OHS): 7.1 % (ref 18–48)
RELATIVE MONOCYTE (OHS): 10 % (ref 4–15)
RELATIVE NEUTROPHIL (OHS): 81.2 % (ref 38–73)
SODIUM SERPL-SCNC: 142 MMOL/L (ref 136–145)
WBC # BLD AUTO: 6.21 K/UL (ref 3.9–12.7)

## 2025-05-02 PROCEDURE — 82040 ASSAY OF SERUM ALBUMIN: CPT

## 2025-05-02 PROCEDURE — 36415 COLL VENOUS BLD VENIPUNCTURE: CPT

## 2025-05-02 PROCEDURE — 85025 COMPLETE CBC W/AUTO DIFF WBC: CPT

## 2025-05-06 ENCOUNTER — OFFICE VISIT (OUTPATIENT)
Dept: NEUROLOGY | Facility: CLINIC | Age: 50
End: 2025-05-06
Payer: COMMERCIAL

## 2025-05-06 VITALS
HEART RATE: 76 BPM | SYSTOLIC BLOOD PRESSURE: 120 MMHG | WEIGHT: 141.75 LBS | DIASTOLIC BLOOD PRESSURE: 89 MMHG | BODY MASS INDEX: 24.2 KG/M2 | HEIGHT: 64 IN

## 2025-05-06 DIAGNOSIS — R32 BOWEL AND BLADDER INCONTINENCE: ICD-10-CM

## 2025-05-06 DIAGNOSIS — N39.41 URGE INCONTINENCE OF URINE: ICD-10-CM

## 2025-05-06 DIAGNOSIS — R15.9 BOWEL AND BLADDER INCONTINENCE: ICD-10-CM

## 2025-05-06 DIAGNOSIS — R25.2 SPASTICITY: ICD-10-CM

## 2025-05-06 DIAGNOSIS — G35 MULTIPLE SCLEROSIS: Primary | ICD-10-CM

## 2025-05-06 PROCEDURE — 3079F DIAST BP 80-89 MM HG: CPT | Mod: CPTII,S$GLB,,

## 2025-05-06 PROCEDURE — 99215 OFFICE O/P EST HI 40 MIN: CPT | Mod: S$GLB,,,

## 2025-05-06 PROCEDURE — G2211 COMPLEX E/M VISIT ADD ON: HCPCS | Mod: S$GLB,,,

## 2025-05-06 PROCEDURE — 1159F MED LIST DOCD IN RCRD: CPT | Mod: CPTII,S$GLB,,

## 2025-05-06 PROCEDURE — 1160F RVW MEDS BY RX/DR IN RCRD: CPT | Mod: CPTII,S$GLB,,

## 2025-05-06 PROCEDURE — 99999 PR PBB SHADOW E&M-EST. PATIENT-LVL V: CPT | Mod: PBBFAC,,,

## 2025-05-06 PROCEDURE — 3074F SYST BP LT 130 MM HG: CPT | Mod: CPTII,S$GLB,,

## 2025-05-06 PROCEDURE — 3008F BODY MASS INDEX DOCD: CPT | Mod: CPTII,S$GLB,,

## 2025-05-06 RX ORDER — BACLOFEN 20 MG/1
20 TABLET ORAL NIGHTLY
Qty: 90 TABLET | Refills: 1 | Status: SHIPPED | OUTPATIENT
Start: 2025-05-06

## 2025-05-06 NOTE — PROGRESS NOTES
Patient ID: Thu Juares is a 50 y.o. female who presents today for a routine clinic visit for MS.  She was last seen by Dr. Elizabeth on 2/5/2025.  The history was provided by the patient.     NEURO MULTIPLE SCLEROISIS SUMMARY:   Principle neurological diagnosis:  MS  Date of Symptom Onset:  8/2004  Date of Diagnosis:  10/2004  Disease type at diagnosis:  Relapsin-Remitting MS  Disease type currently:  Relapsing-Remitting MS  Previous Therapy:  First DMT:  Interferon beta-1b SQ - 2004 - ?  Second DMT:  Glatiramer acetate  Third DMT:  Interferon beta-1b SQ - 1473-6399, again in 3073-8032 (breakthrough relapse)  Fourth DMT:  Natalizumab - 1998-4179 (s/e)  Fifth DMT:  Other - mesenchymal stem cell transplant May 2024  Sixth DMT:  Ocrelizumab - 9297-1385 (colitis diagnosis)  Current Therapy:  Cladribine - 3/2025 - present     Last MRI Brain:  1/17/2025 - stable  Last MRI C-Spine:  1/17/2025 - stable  Last MRI T-Spine:  1/17/2025 - stable  Labs   No CSF completed  No JCV completed  Other relevant labs and studies:    1/15/2025: vitamin D - 29, sNfL - 7.0      Subjective:     She states that she did have diarrhea and headache with the first month of Mavenclad, but did not have issues with the second month.     She is asking to do vestibular therapy again because she feels that the vertigo is about to act up again.     She does have fatigue and feels tightness in her legs.  These are not new symptoms for her.      She is taking CoQ10 600mg, but she does not feel that it helps.  She also, does not sleep well.  She has a hard time falling asleep and wakes up frequently throughout the night and not always able to go back to sleep. She also experiences hot flashes.     Her most bothersome symptom is urinary frequency/urgency/leakage. She experiences spasticity in her pelvic floor, which causes these symptoms. She has done pelvic floor therapy and she uses baclofen vaginal suppositories - oral baclofen only helps with her leg  spasms. She is seeing urology and wonders if she would be a candidate for Botox.       ROS:      5/1/2025    10:14 PM   REVIEW OF SYMPTOMS   Do you feel abnormally tired on most days? Yes - CoQ10 600mg not seeing a difference.    Do you feel you generally sleep well? No - can fall asleep but she wakes up frequently and not able to fall back to sleep.    Do you have difficulty controlling your bladder?  Yes - tightness   Do you have difficulty controlling your bowels?  Yes - tightness   Do you have frequent muscle cramps, tightness or spasms in your limbs?  Yes   Do you have new visual symptoms?  No   Do you have worsening difficulty with your memory or thinking? No   Do you have worsening symptoms of anxiety or depression?  Yes - bathroom anxiety    For patients who walk, Do you have more difficulty walking?  No   Have you fallen since your last visit?  No   For patients who use wheelchairs: Do you have any skin wounds or breakdown? Not Applicable   Do you have difficulty using your hands?  No   Do you have shooting or burning pain? No   Do you have difficulty with sexual function?  Yes   If you are sexually active, are you using birth control? Y/N  N/A Not Applicable   Do you often choke when swallowing liquids or solid food?  No   Do you experience worsening symptoms when overheated? Yes   Do you need any new equipment such as a wheelchair, walker or shower chair? No   Do you receive co-pay financial assistance for your principal MS medicine? Yes   Would you be interested in participating in an MS research trial in the future? Yes   For patients on Gilenya, Tecfidera, Aubagio, Rituxan, Ocrevus, Tysabri, Lemtrada or Methotrexate, are you aware that you should NOT receive live virus vaccines?  Yes    Do you feel you have adequate family/friend support?  Yes   Do you have health insurance?   Yes   Are you currently employed? Yes   Do you receive SSDI/SSI?  Not Applicable   Do you use marijuana or cannabis products?  No   Have you been diagnosed with a urinary tract infection since your last visit here? No   Have you been diagnosed with a respiratory tract infection since your last visit here? No   Have you been to the emergency room since your last visit here? No   Have you been hospitalized since your last visit here?  No            7/21/2024    12:10 PM   FSS SCORE & INTERPRETATION   FSS SCORE  42   FSS SCORE INTERPRETATION May be suffering from fatigue         7/21/2024    12:08 PM   MS WILLIAMS-D SCORE & INTERPRETATION   WILLIAMS-D SCORE  21   WILLIAMS-D INTERPRETATION  Mild to moderate Depression         7/21/2024    12:06 PM   MS TR-7 SCORE & INTERPRETATION   RT-7 SCORE  8   TR-7 SCORE INTERPRETATION Mild Anxiety         7/21/2024    12:11 PM   PEQ MS MOS PAIN EFFECTS SCORE & INTERPRETATION   PES SCORE 11   PES SCORE INTERPRETATION Scores can range from 6-30.  Items are scaled so that higher scores indicate a greater impact of pain on a patients mood and behavior.         7/21/2024    12:13 PM   PEQ MS SEXUAL SATISFACTION SCORE & INTERPRETATION   SSS SCORE  9   SSS SCORE INTERPRETATION Scores can range from 4-24.  Higher scores indicate greater problems with sexual satisfaction.         7/21/2024    12:14 PM   MS BLADDER CONTROL SCORE & INTERPRETATION   BLCS SCORE 8   BLCS SCORE INTERPRETATION  Scores can range from 0-22, with higher scores indicating greater bladder control problems.         7/21/2024    12:20 PM   MS BOWEL CONTROL SCORE & INTERPRETATION   BWCS SCORE 18   BWCS SCORE INTERPRETATION Scores can range from 0-26, with higher scores indicating greater bowel control problems.         7/21/2024    12:15 PM   PEQ MS IMPACT OF VISUAL IMPAIRMENT SCORE & INTERPRETATION   AYDEE SCALE SCORE  0   AYDEE SCORE INTERPRETATION Scores can range from 0-15, with higher scores indicating greater impact of visual problems on daily activites.         7/21/2024    12:17 PM   MS PDQ SCORE & INTERPRETATION   PDQ RETROSPECTIVE MEMORY SUBSCALE 7    PDQ ATTENTION/CONCENTRATION SUBSCALE 7   PDQ PROSPECTIVE MEMORY SUBSCALE 3   PDQ PLANNING/ORGANIZATION SUBSCALE 5   PDQ TOTAL SCORE 22   PDQ SCORE INTERPRETATION Scores can range from 0-80, with higher scores indicating greater perceived cognitive impairment.         7/21/2024    12:22 PM   MSSS SCORE & INTERPRETATION   MSSS TANGIBLE SUPPORT SUBSCALE 81.25   MSSS EMOTIONAL/INFORMATIONAL SUPPORT SUBSCALE 100   MSSS AFFECTIONATE SUPPORT SUBSCALE 100   MSSS POSITIVE SOCIAL INTERACTION SUBSCALE 100   MSSS TOTAL SCORE 95.31   MSSS SCORE INTERPRETATION Scores can range from 0-100, with higher scores indicating greater perceived support.        SOCIAL HISTORY  Living arrangements - the patient lives with their family.  Social History     Socioeconomic History    Marital status:    Tobacco Use    Smoking status: Never    Smokeless tobacco: Former   Substance and Sexual Activity    Alcohol use: Not Currently    Drug use: Never    Sexual activity: Yes     Partners: Male     Social Drivers of Health     Financial Resource Strain: Low Risk  (5/5/2025)    Overall Financial Resource Strain (CARDIA)     Difficulty of Paying Living Expenses: Not very hard   Food Insecurity: No Food Insecurity (5/5/2025)    Hunger Vital Sign     Worried About Running Out of Food in the Last Year: Never true     Ran Out of Food in the Last Year: Never true   Transportation Needs: No Transportation Needs (5/5/2025)    PRAPARE - Transportation     Lack of Transportation (Medical): No     Lack of Transportation (Non-Medical): No   Physical Activity: Inactive (5/5/2025)    Exercise Vital Sign     Days of Exercise per Week: 0 days     Minutes of Exercise per Session: 0 min   Stress: No Stress Concern Present (5/5/2025)    [employer] - Occupational Stress Questionnaire     Feeling of Stress : Only a little   Housing Stability: Low Risk  (5/5/2025)    Housing Stability Vital Sign     Unable to Pay for Housing in the  Last Year: No     Number of Times Moved in the Last Year: 0     Homeless in the Last Year: No       Medications Ordered Prior to Encounter[1]    Objective:     1. 25 foot timed walk:      8/15/2024     2:00 PM 2/5/2025    12:00 PM   Timed 25 Foot Walk:   Did patient wear an AFO? No No   Was assistive device used? No No   Time for 25 Foot Walk (seconds) 5.78 5.91   Time for 25 Foot Walk (seconds) 5.61 5.75       2. SDMT       No data to display                       NEURO EXAM    In general, the patient is well nourished and appears to be in no acute distress.    MENTAL STATUS: language is fluent, normal verbal comprehension, short-term and remote memory is intact, attention is normal, patient is alert and oriented x 3, fund of knowlege is appropriate by vocabulary.     Imaging: personally reviewed     Results for orders placed during the hospital encounter of 01/17/25    MRI Brain Demyelinating Without Contrast    Impression  The brain and spinal cord appear unchanged from prior exam, again demonstrating findings compatible with the reported history of multiple sclerosis.  No new discrete lesions identified to indicate ongoing demyelination.      Electronically signed by: Jordan Jacobson MD  Date:    01/17/2025  Time:    12:16    Results for orders placed during the hospital encounter of 01/17/25    MRI Cervical Spine Demyelinating Without Contrast    Impression  The brain and spinal cord appear unchanged from prior exam, again demonstrating findings compatible with the reported history of multiple sclerosis.  No new discrete lesions identified to indicate ongoing demyelination.      Electronically signed by: oJrdan Jacobson MD  Date:    01/17/2025  Time:    12:16    Results for orders placed during the hospital encounter of 01/17/25    MRI Thoracic Spine Demyelinating Without Contrast    Impression  The brain and spinal cord appear unchanged from prior exam, again demonstrating findings compatible with the reported  "history of multiple sclerosis.  No new discrete lesions identified to indicate ongoing demyelination.      Electronically signed by: Jordan Jacobson MD  Date:    01/17/2025  Time:    12:16    No results found for this or any previous visit.    No results found for this or any previous visit.    No results found for this or any previous visit.        Labs: personally reviewed      Lab Results   Component Value Date    CRPXZQCL57ZP 29 (L) 01/15/2025    TZBCFHSY86QQ 27 (L) 02/07/2024    LZECQJSJ86PI 30 11/15/2016     No results found for: "JCVINDEX", "JCVANTIBODY"  No results found for: "DL3TONYK", "ABSOLUTECD3", "NP4ILGPS", "ABSOLUTECD8", "PU0KPVPS", "ABSOLUTECD4", "LABCD48"  Lab Results   Component Value Date    WBC 6.21 05/02/2025    RBC 4.91 05/02/2025    HGB 15.4 05/02/2025    HCT 44.6 05/02/2025    MCV 91 05/02/2025    MCH 31.4 (H) 05/02/2025    MCHC 34.5 05/02/2025    RDW 12.5 05/02/2025     05/02/2025    MPV 8.4 (L) 05/02/2025    GRAN 6.5 02/19/2025    GRAN 71.1 02/19/2025    LYMPH 7.1 (L) 05/02/2025    LYMPH 0.44 (L) 05/02/2025    MONO 10.0 05/02/2025    MONO 0.62 05/02/2025    EOS 1.1 05/02/2025    EOS 0.07 05/02/2025    BASO 0.06 02/19/2025    EOSINOPHIL 2.0 02/19/2025    BASOPHIL 0.3 05/02/2025    BASOPHIL 0.02 05/02/2025     Sodium   Date Value Ref Range Status   05/02/2025 142 136 - 145 mmol/L Final   01/15/2025 143 136 - 145 mmol/L Final     Potassium   Date Value Ref Range Status   05/02/2025 4.2 3.5 - 5.1 mmol/L Final   01/15/2025 4.2 3.5 - 5.1 mmol/L Final     Chloride   Date Value Ref Range Status   05/02/2025 107 95 - 110 mmol/L Final   01/15/2025 107 95 - 110 mmol/L Final     CO2   Date Value Ref Range Status   05/02/2025 30 (H) 23 - 29 mmol/L Final   01/15/2025 27 23 - 29 mmol/L Final     Glucose   Date Value Ref Range Status   05/02/2025 101 70 - 110 mg/dL Final   01/15/2025 98 70 - 110 mg/dL Final     BUN   Date Value Ref Range Status   05/02/2025 13 6 - 20 mg/dL Final     Creatinine "   Date Value Ref Range Status   05/02/2025 0.8 0.5 - 1.4 mg/dL Final     Calcium   Date Value Ref Range Status   05/02/2025 8.9 8.7 - 10.5 mg/dL Final   01/15/2025 9.5 8.7 - 10.5 mg/dL Final     Protein Total   Date Value Ref Range Status   05/02/2025 6.9 6.0 - 8.4 gm/dL Final     Total Protein   Date Value Ref Range Status   01/15/2025 7.2 6.0 - 8.4 g/dL Final     Albumin   Date Value Ref Range Status   05/02/2025 4.0 3.5 - 5.2 g/dL Final   01/15/2025 4.1 3.5 - 5.2 g/dL Final     Total Bilirubin   Date Value Ref Range Status   01/15/2025 0.5 0.1 - 1.0 mg/dL Final     Comment:     For infants and newborns, interpretation of results should be based  on gestational age, weight and in agreement with clinical  observations.    Premature Infant recommended reference ranges:  Up to 24 hours.............<8.0 mg/dL  Up to 48 hours............<12.0 mg/dL  3-5 days..................<15.0 mg/dL  6-29 days.................<15.0 mg/dL       Bilirubin Total   Date Value Ref Range Status   05/02/2025 0.6 0.1 - 1.0 mg/dL Final     Comment:     For infants and newborns, interpretation of results should be based   on gestational age, weight and in agreement with clinical   observations.    Premature Infant recommended reference ranges:   0-24 hours:  <8.0 mg/dL   24-48 hours: <12.0 mg/dL   3-5 days:    <15.0 mg/dL   6-29 days:   <15.0 mg/dL     Alkaline Phosphatase   Date Value Ref Range Status   01/15/2025 91 40 - 150 U/L Final     ALP   Date Value Ref Range Status   05/02/2025 84 40 - 150 unit/L Final     AST   Date Value Ref Range Status   05/02/2025 16 11 - 45 unit/L Final   01/15/2025 15 10 - 40 U/L Final     ALT   Date Value Ref Range Status   05/02/2025 20 10 - 44 unit/L Final   01/15/2025 15 10 - 44 U/L Final     Anion Gap   Date Value Ref Range Status   05/02/2025 5 (L) 8 - 16 mmol/L Final     Lab Results   Component Value Date    HEPBSAG Non-reactive 01/15/2025    HEPBSAB <3.00 02/01/2023    HEPBSAB Non-reactive 02/01/2023     HEPBCAB Non-reactive 01/15/2025           MS Impression and Plan:     NEURO MULTIPLE SCLEROSIS IMPRESSION:   Number of relapses in the past year?:  0  Clinical Progression:  Clinically Stable  MRI Progression:  Stable  MS Classification:  Relapsing-Remitting MS  Current DMT: cladribine  DMT:  No change in management  DMT comment:  She is aware of the risks associated with immunosuppressant therapy, including increased risk of infection.   Symptom Management:  Implement change in symptom management  Implement Change in Symptom Management:  Sleep and Heat Sensitivity  Implement Change in Symptom Management comment:   SLEEP: discussed trying melatonin 3-5mg nightly   HOT FLASHES: recommended researching and discussing with GYN [place] supplements for women's health  URINARY: recommended to discuss with urology regarding pelvic floor or bladder Botox. - will send refill of baclofen vaginal suppositories to [facility].   Additional Impressions:   Patient remaining stable and tolerated Mavenclad year 1 with minimal side effects.   Will continue to monitor labs as scheduled and proceed to year 2 pending labs stability.  MRIs in September - brain, cervical and thoracic spine w w/o  Follow up with Dr. Elizabeth in 6 months   Plan discussed and questions were answered to satisfaction.     Problem List Items Addressed This Visit       Multiple sclerosis - Primary    Relevant Medications    baclofen (LIORESAL) 20 MG tablet    Other Relevant Orders    MRI Brain Demyelinating W W/O Contrast    MRI Cervical Spine Demyelinating W W/O Contrast    MRI Thoracic Spine Demyelinating W W/O Contrast    Ambulatory referral/consult to Physical/Occupational Therapy     Other Visit Diagnoses         Urge incontinence of urine        Relevant Medications    baclofen (LIORESAL) 20 MG tablet      Bowel and bladder incontinence        Relevant Medications    baclofen (LIORESAL) 20 MG tablet      Spasticity                   I spent a total of  60 minutes on the day of the visit.This includes face to face time and non-face to face time preparing to see the patient (eg, review of tests), obtaining and/or reviewing separately obtained history, documenting clinical information in the electronic or other health record, independently interpreting results and communicating results to the patient/family/caregiver, or care coordinator.       DEV Santos         [1]   Current Outpatient Medications on File Prior to Visit   Medication Sig Dispense Refill    atorvastatin (LIPITOR) 40 MG tablet Take 40 mg by mouth once daily.      cladribine (MAVENCLAD, 6 TABLET PACK, ORAL) Take 10 mg by mouth once daily.      cladribine,multiple sclerosis, (MAVENCLAD, 6 TABLET PACK,) 10 mg Tab Take 2 tablets (20 mg) by mouth on day 1, THEN take 1 tablet (10 mg) daily for 4 days. 6 tablet 0    co-enzyme Q-10 30 mg capsule Take 600 mg by mouth once daily.      ergocalciferol (ERGOCALCIFEROL) 50,000 unit Cap TAKE 1 CAPSULE (50,000 UNITS TOTAL) BY MOUTH EVERY 7 DAYS. TAKE 1 CAPSULE WEEKLY 12 capsule 3    estradioL (ESTRACE) 0.01 % (0.1 mg/gram) vaginal cream Place 1 g vaginally once daily. 42.5 g 3    meclizine (ANTIVERT) 12.5 mg tablet Take 1 tablet (12.5 mg total) by mouth 3 (three) times daily as needed for Dizziness. 90 tablet 5    ondansetron (ZOFRAN-ODT) 4 MG TbDL Take 1 tablet (4 mg total) by mouth every 12 (twelve) hours as needed (nausea). 20 tablet 0    SYNTHROID 88 mcg tablet Take 1 tablet (88 mcg total) by mouth before breakfast. 90 tablet 1     No current facility-administered medications on file prior to visit.

## 2025-05-08 ENCOUNTER — DOCUMENTATION ONLY (OUTPATIENT)
Dept: NEUROLOGY | Facility: CLINIC | Age: 50
End: 2025-05-08
Payer: COMMERCIAL

## 2025-05-08 NOTE — PROGRESS NOTES
"Faxed compounding pain rx to [facility] at [phone].     Your fax has been successfully sent to [phone] at [phone].  ------------------------------------------------------------  From: [id number]  ------------------------------------------------------------  5/8/2025 11:36:06 AM Transmission Record   Sent to [phone] with remote ID "   Result: (4/3;0/0) Line broken (no loop current)   Page record: NONE SENT   Elapsed time: 00:03 on channel 21    5/8/2025 11:41:13 AM Transmission Record   Sent to [phone] with remote ID "unknown"   Result: (0/339;0/0) Success   Page record: 1 - 4   Elapsed time: 01:39 on channel 59      "

## 2025-05-13 ENCOUNTER — PATIENT MESSAGE (OUTPATIENT)
Dept: PSYCHIATRY | Facility: CLINIC | Age: 50
End: 2025-05-13
Payer: COMMERCIAL

## 2025-06-13 ENCOUNTER — PATIENT MESSAGE (OUTPATIENT)
Dept: UROGYNECOLOGY | Facility: CLINIC | Age: 50
End: 2025-06-13
Payer: COMMERCIAL

## 2025-06-13 ENCOUNTER — TELEPHONE (OUTPATIENT)
Dept: UROGYNECOLOGY | Facility: CLINIC | Age: 50
End: 2025-06-13
Payer: COMMERCIAL

## 2025-06-18 ENCOUNTER — OFFICE VISIT (OUTPATIENT)
Dept: UROGYNECOLOGY | Facility: CLINIC | Age: 50
End: 2025-06-18
Payer: COMMERCIAL

## 2025-06-18 VITALS
HEART RATE: 79 BPM | BODY MASS INDEX: 22.81 KG/M2 | DIASTOLIC BLOOD PRESSURE: 80 MMHG | WEIGHT: 133.63 LBS | SYSTOLIC BLOOD PRESSURE: 132 MMHG | HEIGHT: 64 IN

## 2025-06-18 DIAGNOSIS — N39.46 URINARY INCONTINENCE, MIXED: ICD-10-CM

## 2025-06-18 DIAGNOSIS — N95.2 VAGINAL ATROPHY: ICD-10-CM

## 2025-06-18 DIAGNOSIS — R35.0 URINARY FREQUENCY: Primary | ICD-10-CM

## 2025-06-18 PROCEDURE — 3079F DIAST BP 80-89 MM HG: CPT | Mod: CPTII,S$GLB,, | Performed by: NURSE PRACTITIONER

## 2025-06-18 PROCEDURE — 51701 INSERT BLADDER CATHETER: CPT | Mod: S$GLB,,, | Performed by: NURSE PRACTITIONER

## 2025-06-18 PROCEDURE — 99214 OFFICE O/P EST MOD 30 MIN: CPT | Mod: 25,S$GLB,, | Performed by: NURSE PRACTITIONER

## 2025-06-18 PROCEDURE — 1159F MED LIST DOCD IN RCRD: CPT | Mod: CPTII,S$GLB,, | Performed by: NURSE PRACTITIONER

## 2025-06-18 PROCEDURE — 3008F BODY MASS INDEX DOCD: CPT | Mod: CPTII,S$GLB,, | Performed by: NURSE PRACTITIONER

## 2025-06-18 PROCEDURE — 87086 URINE CULTURE/COLONY COUNT: CPT | Performed by: NURSE PRACTITIONER

## 2025-06-18 PROCEDURE — 99999 PR PBB SHADOW E&M-EST. PATIENT-LVL III: CPT | Mod: PBBFAC,,, | Performed by: NURSE PRACTITIONER

## 2025-06-18 PROCEDURE — 1160F RVW MEDS BY RX/DR IN RCRD: CPT | Mod: CPTII,S$GLB,, | Performed by: NURSE PRACTITIONER

## 2025-06-18 PROCEDURE — 3075F SYST BP GE 130 - 139MM HG: CPT | Mod: CPTII,S$GLB,, | Performed by: NURSE PRACTITIONER

## 2025-06-18 RX ORDER — ESTRADIOL 0.1 MG/G
1 CREAM VAGINAL DAILY
Qty: 42.5 G | Refills: 3 | Status: SHIPPED | OUTPATIENT
Start: 2025-06-18

## 2025-06-18 NOTE — PROGRESS NOTES
Urogyn follow up  06/18/2025  .  [facility]  [address]  [facility] [address]    Thu Juares  [id number]  1975      Thu Juares is a 50 y.o. here for a urogyn follow up for urinary incontinence    Notes:    Last HPI from 03/22/2023     1)  UI:  (--) TERRI < (+) UUI  X 3years. Occasionally does not make it to the restroom if holding longer than 3-4 hours-- can not stop the flow of urine (+) pads:mostly for protection.  Daytime frequency: Q 3 hours.  Nocturia: Yes: 1/night.   (--) dysuria,  (--) hematuria,  (--) frequent UTIs.  (+) complete bladder emptying.      2)  POP:  Absent. Symptoms:(--)  .  (--) vaginal bleeding. (--) vaginal discharge. (+) sexually active.  (--) dyspareunia.   (--)  Vaginal dryness.  (--) vaginal estrogen use.  Anorgasma--present for the past year     3)  BM:  (+) constipation/straining.  (--) chronic diarrhea. (--) hematochezia.  (--) fecal incontinence.  (+) fecal smearing/urgency.  (--) complete evacuation.       4)MS  --diagnosed in 2004  --typical flare starts with vertigo. No sleeping can cause exacerbaton as well as word finding  --taking ocrevis--last dose in 02/2023 07/19/2023     1)  Mixed urinary incontinence, urge > stress:    --has been going to pelvic floor PT  --still has urinary urgency  --voiding every 2-3 hours while awake     2)Constipation:  --improved with  fiber supplement    3)complains of RL back pain    11/06/2023  1)  Mixed urinary incontinence, urge > stress:    --completed pelvic floor PT  --symptoms initially improved--has not been compliant with HEP  --voiding every 3 hours while awake  --does have urinary urgency if she ignores urge  --nocturia 1-2/ night  --did not try gemtesa     2)Constipation:  --taking probiotics  --stopped fiber supplement  --intermittent constipation    12/2023--had kidney stone-- treated at [facility]    06/24/22024 E visit for dysruia --UC multiple organisms none in  predominance    2024  Began having dysuria 2 days ago-- thinks she passed a stone Monday. Still has flank pain  Urinary frequency improved after she passed the stone.  Still has urinary urgency  + R flank pain  Denies fever  +UUI if ignores urge--harder to stop  Voiding every 1-2 hours during the day  Nocturia 1-2/night  Wearing a pad for protection    2024  Using estrogen cream intermittently  Last uti treated last month by urgent care--not sure uc results  Denies flank pain  Having pelvic floor tension-- going to [facility]--neurologist has her on baclofen 4 times daily  + constipation--not taking stool softener    Changes since last visit:  Does not feel like she is emptying her bladder  Having urinary urgency/frequency. Voiding every 1 -3 hours during the day and 2-3/night. Limits fluids about 2 hours prior to bedtime  Constipation--denies straining. Started holistic detox diet.   Drinking 32-60 ounces         Past Medical History:   Diagnosis Date    Hypothyroidism surgical for benign nodules    Multiple sclerosis        Past Surgical History:   Procedure Laterality Date     SECTION  10/2013    COLONOSCOPY N/A 10/12/2024    Procedure: COLONOSCOPY;  Surgeon: America Randolph MD;  Location: [facility] ENDOSCOPY;  Service: Endoscopy;  Laterality: N/A;    ESOPHAGOGASTRODUODENOSCOPY N/A 10/12/2024    Procedure: EGD (ESOPHAGOGASTRODUODENOSCOPY);  Surgeon: America Randolph MD;  Location: [facility] ENDOSCOPY;  Service: Endoscopy;  Laterality: N/A;  JOANA Vences/Fabrice/portal SW  10.10 case time adjusted to 60 min.; AP  10.11 precall complete; all ques answered; AP       Family History   Problem Relation Name Age of Onset    Breast cancer Sister      Thyroid disease Neg Hx      Diabetes Neg Hx         Social History     Socioeconomic History    Marital status:    Tobacco Use    Smoking status: Never    Smokeless tobacco: Former   Substance and Sexual Activity    Alcohol use: Not Currently    Drug use:  Never    Sexual activity: Yes     Partners: Male     Social Drivers of Health     Financial Resource Strain: Low Risk  (5/5/2025)    Overall Financial Resource Strain (CARDIA)     Difficulty of Paying Living Expenses: Not very hard   Food Insecurity: No Food Insecurity (5/5/2025)    Hunger Vital Sign     Worried About Running Out of Food in the Last Year: Never true     Ran Out of Food in the Last Year: Never true   Transportation Needs: No Transportation Needs (5/5/2025)    PRAPARE - Transportation     Lack of Transportation (Medical): No     Lack of Transportation (Non-Medical): No   Physical Activity: Inactive (5/5/2025)    Exercise Vital Sign     Days of Exercise per Week: 0 days     Minutes of Exercise per Session: 0 min   Stress: No Stress Concern Present (5/5/2025)    [employer] - Occupational Stress Questionnaire     Feeling of Stress : Only a little   Housing Stability: Low Risk  (5/5/2025)    Housing Stability Vital Sign     Unable to Pay for Housing in the Last Year: No     Number of Times Moved in the Last Year: 0     Homeless in the Last Year: No       Current Outpatient Medications   Medication Sig Dispense Refill    atorvastatin (LIPITOR) 40 MG tablet Take 40 mg by mouth once daily.      baclofen (LIORESAL) 20 MG tablet Take 1 tablet (20 mg total) by mouth every evening. 90 tablet 1    cladribine (MAVENCLAD, 6 TABLET PACK, ORAL) Take 10 mg by mouth once daily.      cladribine,multiple sclerosis, (MAVENCLAD, 6 TABLET PACK,) 10 mg Tab Take 2 tablets (20 mg) by mouth on day 1, THEN take 1 tablet (10 mg) daily for 4 days. 6 tablet 0    co-enzyme Q-10 30 mg capsule Take 600 mg by mouth once daily.      ergocalciferol (ERGOCALCIFEROL) 50,000 unit Cap TAKE 1 CAPSULE (50,000 UNITS TOTAL) BY MOUTH EVERY 7 DAYS. TAKE 1 CAPSULE WEEKLY 12 capsule 3    estradioL (ESTRACE) 0.01 % (0.1 mg/gram) vaginal cream Place 1 g vaginally once daily. 42.5 g 3    meclizine (ANTIVERT) 12.5 mg tablet  "Take 1 tablet (12.5 mg total) by mouth 3 (three) times daily as needed for Dizziness. 90 tablet 5    ondansetron (ZOFRAN-ODT) 4 MG TbDL Take 1 tablet (4 mg total) by mouth every 12 (twelve) hours as needed (nausea). 20 tablet 0    SYNTHROID 88 mcg tablet Take 1 tablet (88 mcg total) by mouth before breakfast. 90 tablet 1     No current facility-administered medications for this visit.       Review of patient's allergies indicates:   Allergen Reactions    Natalizumab        Well woman:  Pap test: 10/2022 normal per report.  History of abnormal paps: Yes - in 1990's-- had cryo.  History of STIs:  No  Mammogram: Date of last: 1/2024.  Result: Normal   Colonoscopy: Date of last: 02/2023.  Result:  polyp per patient report-- repeat in 7 years.    DEXA:  n/a     ROS:  As per HPI.      Exam  /80 (BP Location: Left arm, Patient Position: Sitting)   Pulse 79   Ht 5' 4" (1.626 m)   Wt 60.6 kg (133 lb 9.6 oz)   BMI 22.93 kg/m²   General: alert and oriented, no acute distress  Respiratory: normal respiratory effort  Abd: soft, non-tender, non-distended      PELVIC EXAM:   VULVA: normal appearing vulva with no masses, tenderness or lesions,   VAGINA: normal appearing vagina with normal color and discharge, no lesions, atrophic,  NO TTP   CERVIX: normal appearing cervix without discharge or lesions,   UTERUS: uterus is normal size, shape, consistency and nontender,   ADNEXA: no masses,   RECTAL: deferred     Pvr 20 mL  Impression  1. Urinary frequency  Urine Culture High Risk ($$)      2. Vaginal atrophy  estradioL (ESTRACE) 0.01 % (0.1 mg/gram) vaginal cream      3. Urinary incontinence, mixed                We reviewed the above issues and discussed options for short-term versus long-term management of her problems.   Plan:      1)  Mixed urinary incontinence, urge > stress:    --Empty bladder every 3 hours.  Empty well: wait a minute, lean forward on toilet.    --Avoid dietary irritants (see sheet).  Keep diary x 3-5 " days to determine your irritants.  --do pelvic floor home exercise program  --URGE: consider gemtesa if estrogen cream doesn't healp        2)Constipation:  --Take 1 fiber pill/day x 3 days.  Then 2 pills/day x 3 days.  Then 3 pills/day x 3 days...increasing in this fashion to max 6 pills a day.  STOP when you find dose that makes stool easy to pass (this may be 1 pill a day or may be 4 pills/day).  Continue at this dose FOREVER.  Additionally, take 1-2 stool softener pills (EX: colace) OTC daily.  AVOID laxatives.    OR  You can use the wafers- make sure to drink water with it.       3)start vaginal estrogen cream  --use dime size amount in vagina-- insert to your second knuckle and rub around just inside vaginal opening twice weekly    4)pelvic floor tension  --continue pelvic floor PT  --will try vaginal valium / lidocaine/ baclofen-- can use one suppository twice daily  --I have sent in the prescription to [facility] in [place], [place].  Their phone number is [phone].  Ask for the compounding department.      6)RTC 2 months     I spent a total of 30 minutes on the day of the visit.  This includes face to face time and non-face to face time preparing to see the patient (eg, review of tests), obtaining and/or reviewing separately obtained history, documenting clinical information in the electronic or other health record, independently interpreting results and communicating results to the patient/family/caregiver, or care coordinator.     Cinthya Kumari, FNP-BC  [facility]  Division of Female Pelvic Medicine and Reconstructive Surgery  Department of Obstetrics & Gynecology

## 2025-06-19 ENCOUNTER — PATIENT MESSAGE (OUTPATIENT)
Dept: PSYCHIATRY | Facility: CLINIC | Age: 50
End: 2025-06-19
Payer: COMMERCIAL

## 2025-06-19 NOTE — PATIENT INSTRUCTIONS
1)  Mixed urinary incontinence, urge > stress:    --Empty bladder every 3 hours.  Empty well: wait a minute, lean forward on toilet.    --Avoid dietary irritants (see sheet).  Keep diary x 3-5 days to determine your irritants.  --do pelvic floor home exercise program  --URGE: consider gemtesa if estrogen cream doesn't healp        2)Constipation:  --Take 1 fiber pill/day x 3 days.  Then 2 pills/day x 3 days.  Then 3 pills/day x 3 days...increasing in this fashion to max 6 pills a day.  STOP when you find dose that makes stool easy to pass (this may be 1 pill a day or may be 4 pills/day).  Continue at this dose FOREVER.  Additionally, take 1-2 stool softener pills (EX: colace) OTC daily.  AVOID laxatives.    OR  You can use the wafers- make sure to drink water with it.       3)start vaginal estrogen cream  --use dime size amount in vagina-- insert to your second knuckle and rub around just inside vaginal opening twice weekly    4)pelvic floor tension  --continue pelvic floor PT  --will try vaginal valium / lidocaine/ baclofen-- can use one suppository twice daily  --I have sent in the prescription to [facility] in [place], [place].  Their phone number is [phone].  Ask for the compounding department.      6)RTC 2 months

## 2025-06-20 ENCOUNTER — RESULTS FOLLOW-UP (OUTPATIENT)
Dept: UROGYNECOLOGY | Facility: CLINIC | Age: 50
End: 2025-06-20

## 2025-06-20 LAB — BACTERIA UR CULT: NORMAL

## 2025-07-15 ENCOUNTER — PATIENT MESSAGE (OUTPATIENT)
Dept: UROGYNECOLOGY | Facility: CLINIC | Age: 50
End: 2025-07-15
Payer: COMMERCIAL

## 2025-07-30 ENCOUNTER — PATIENT MESSAGE (OUTPATIENT)
Dept: PSYCHIATRY | Facility: CLINIC | Age: 50
End: 2025-07-30
Payer: COMMERCIAL

## 2025-08-01 ENCOUNTER — PATIENT MESSAGE (OUTPATIENT)
Dept: PSYCHIATRY | Facility: CLINIC | Age: 50
End: 2025-08-01
Payer: COMMERCIAL

## 2025-08-18 ENCOUNTER — PATIENT MESSAGE (OUTPATIENT)
Dept: PSYCHIATRY | Facility: CLINIC | Age: 50
End: 2025-08-18
Payer: COMMERCIAL

## 2025-08-29 PROBLEM — R42 DIZZINESS: Status: RESOLVED | Noted: 2018-10-22 | Resolved: 2025-08-29

## 2025-08-29 PROBLEM — R26.2 DIFFICULTY WALKING: Status: RESOLVED | Noted: 2018-10-23 | Resolved: 2025-08-29

## 2025-08-29 PROBLEM — R26.89 IMBALANCE: Status: RESOLVED | Noted: 2021-04-15 | Resolved: 2025-08-29

## 2025-09-01 ENCOUNTER — PATIENT MESSAGE (OUTPATIENT)
Dept: NEUROLOGY | Facility: CLINIC | Age: 50
End: 2025-09-01
Payer: COMMERCIAL